# Patient Record
Sex: MALE | Race: WHITE | NOT HISPANIC OR LATINO | Employment: OTHER | URBAN - METROPOLITAN AREA
[De-identification: names, ages, dates, MRNs, and addresses within clinical notes are randomized per-mention and may not be internally consistent; named-entity substitution may affect disease eponyms.]

---

## 2017-05-02 ENCOUNTER — PATIENT MESSAGE (OUTPATIENT)
Dept: ORTHOPEDICS | Facility: CLINIC | Age: 72
End: 2017-05-02

## 2017-05-03 ENCOUNTER — TELEPHONE (OUTPATIENT)
Dept: ORTHOPEDICS | Facility: CLINIC | Age: 72
End: 2017-05-03

## 2017-05-03 DIAGNOSIS — Z09 FOLLOW-UP EXAMINATION AFTER ORTHOPEDIC SURGERY: ICD-10-CM

## 2017-05-03 RX ORDER — OXYCODONE AND ACETAMINOPHEN 10; 325 MG/1; MG/1
1 TABLET ORAL EVERY 8 HOURS PRN
Qty: 60 TABLET | Refills: 0 | Status: SHIPPED | OUTPATIENT
Start: 2017-05-03 | End: 2017-05-08 | Stop reason: SDUPTHER

## 2017-05-03 NOTE — TELEPHONE ENCOUNTER
Spoke with pt.  Advised script is ready to  at our office.   Pt insisted that script be mailed to his home address

## 2017-05-08 DIAGNOSIS — Z09 FOLLOW-UP EXAMINATION AFTER ORTHOPEDIC SURGERY: ICD-10-CM

## 2017-05-08 RX ORDER — OXYCODONE AND ACETAMINOPHEN 10; 325 MG/1; MG/1
1 TABLET ORAL EVERY 8 HOURS PRN
Qty: 60 TABLET | Refills: 0 | Status: SHIPPED | OUTPATIENT
Start: 2017-05-08 | End: 2018-02-08 | Stop reason: ALTCHOICE

## 2017-06-14 ENCOUNTER — OFFICE VISIT (OUTPATIENT)
Dept: NEPHROLOGY | Facility: CLINIC | Age: 72
End: 2017-06-14
Payer: MEDICARE

## 2017-06-14 ENCOUNTER — LAB VISIT (OUTPATIENT)
Dept: LAB | Facility: HOSPITAL | Age: 72
End: 2017-06-14
Attending: INTERNAL MEDICINE
Payer: MEDICARE

## 2017-06-14 VITALS — HEART RATE: 61 BPM | OXYGEN SATURATION: 98 % | WEIGHT: 167.13 LBS | BODY MASS INDEX: 24.75 KG/M2 | HEIGHT: 69 IN

## 2017-06-14 DIAGNOSIS — N18.30 CHRONIC KIDNEY DISEASE, STAGE III (MODERATE): ICD-10-CM

## 2017-06-14 DIAGNOSIS — N20.0 CALCULUS OF KIDNEY: ICD-10-CM

## 2017-06-14 DIAGNOSIS — N18.30 CHRONIC KIDNEY DISEASE, STAGE III (MODERATE): Primary | ICD-10-CM

## 2017-06-14 DIAGNOSIS — N17.9 ACUTE RENAL FAILURE, UNSPECIFIED ACUTE RENAL FAILURE TYPE: ICD-10-CM

## 2017-06-14 LAB
ALBUMIN SERPL BCP-MCNC: 3.9 G/DL
ANION GAP SERPL CALC-SCNC: 7 MMOL/L
BASOPHILS # BLD AUTO: 0.03 K/UL
BASOPHILS NFR BLD: 0.5 %
BUN SERPL-MCNC: 36 MG/DL
CALCIUM SERPL-MCNC: 9.3 MG/DL
CHLORIDE SERPL-SCNC: 111 MMOL/L
CO2 SERPL-SCNC: 21 MMOL/L
CREAT SERPL-MCNC: 1.7 MG/DL
DIFFERENTIAL METHOD: ABNORMAL
EOSINOPHIL # BLD AUTO: 0.2 K/UL
EOSINOPHIL NFR BLD: 2.9 %
EOSINOPHIL URNS QL WRIGHT STN: NORMAL
ERYTHROCYTE [DISTWIDTH] IN BLOOD BY AUTOMATED COUNT: 13.2 %
EST. GFR  (AFRICAN AMERICAN): 46.5 ML/MIN/1.73 M^2
EST. GFR  (NON AFRICAN AMERICAN): 40.3 ML/MIN/1.73 M^2
GLUCOSE SERPL-MCNC: 135 MG/DL
HCT VFR BLD AUTO: 38.7 %
HGB BLD-MCNC: 13.3 G/DL
LYMPHOCYTES # BLD AUTO: 1.3 K/UL
LYMPHOCYTES NFR BLD: 22 %
MCH RBC QN AUTO: 30.9 PG
MCHC RBC AUTO-ENTMCNC: 34.4 %
MCV RBC AUTO: 90 FL
MONOCYTES # BLD AUTO: 0.6 K/UL
MONOCYTES NFR BLD: 9.7 %
NEUTROPHILS # BLD AUTO: 3.8 K/UL
NEUTROPHILS NFR BLD: 64.2 %
PHOSPHATE SERPL-MCNC: 3.5 MG/DL
PLATELET # BLD AUTO: 134 K/UL
PMV BLD AUTO: 12.7 FL
POTASSIUM SERPL-SCNC: 5 MMOL/L
PTH-INTACT SERPL-MCNC: 56 PG/ML
RBC # BLD AUTO: 4.3 M/UL
SODIUM SERPL-SCNC: 139 MMOL/L
WBC # BLD AUTO: 5.86 K/UL

## 2017-06-14 PROCEDURE — 86334 IMMUNOFIX E-PHORESIS SERUM: CPT

## 2017-06-14 PROCEDURE — 1159F MED LIST DOCD IN RCRD: CPT | Mod: S$GLB,,, | Performed by: INTERNAL MEDICINE

## 2017-06-14 PROCEDURE — 85025 COMPLETE CBC W/AUTO DIFF WBC: CPT

## 2017-06-14 PROCEDURE — 99204 OFFICE O/P NEW MOD 45 MIN: CPT | Mod: S$GLB,,, | Performed by: INTERNAL MEDICINE

## 2017-06-14 PROCEDURE — 99999 PR PBB SHADOW E&M-EST. PATIENT-LVL II: CPT | Mod: PBBFAC,,, | Performed by: INTERNAL MEDICINE

## 2017-06-14 PROCEDURE — 86334 IMMUNOFIX E-PHORESIS SERUM: CPT | Mod: 26,,, | Performed by: PATHOLOGY

## 2017-06-14 PROCEDURE — 36415 COLL VENOUS BLD VENIPUNCTURE: CPT | Mod: PO

## 2017-06-14 PROCEDURE — 80069 RENAL FUNCTION PANEL: CPT

## 2017-06-14 PROCEDURE — 83970 ASSAY OF PARATHORMONE: CPT

## 2017-06-14 PROCEDURE — 1125F AMNT PAIN NOTED PAIN PRSNT: CPT | Mod: S$GLB,,, | Performed by: INTERNAL MEDICINE

## 2017-06-14 PROCEDURE — 99499 UNLISTED E&M SERVICE: CPT | Mod: S$GLB,,, | Performed by: INTERNAL MEDICINE

## 2017-06-14 RX ORDER — DEXTROMETHORPHAN HYDROBROMIDE, GUAIFENESIN 5; 100 MG/5ML; MG/5ML
650 LIQUID ORAL 2 TIMES DAILY
COMMUNITY
Start: 2022-12-20

## 2017-06-14 NOTE — LETTER
June 14, 2017      Ally Grajeda MD  [address] [facility] [address]           [facility]  [address] [facility] [address] [place] [address]  Phone: [phone]          Patient: Rojelio Dye Sr.   MR Number: [id number]   YOB: 1945   Date of Visit: 6/14/2017       Dear Dr. Ally Grajeda:    Thank you for referring Rojelio Dye to me for evaluation. Attached you will find relevant portions of my assessment and plan of care.    If you have questions, please do not hesitate to call me. I look forward to following Rojelio Dye along with you.    Sincerely,    Rodriguez Brandt MD    Enclosure  CC:  No Recipients    If you would like to receive this communication electronically, please contact [email or web address] or [phone] to request more information on [employer] Link access.    For providers and/or their staff who would like to refer a patient to [facility], please contact us through our one-stop-shop provider referral line, [facility], [place], at [phone].    If you feel you have received this communication in error or would no longer like to receive these types of communications, please e-mail [email or web address]

## 2017-06-14 NOTE — PROGRESS NOTES
Subjective:       Patient ID: Rojelio Dye Sr. is a 71 y.o. White male who presents for new patient evaluation for chronic renal failure.    Rojelio Dye Sr. is referred by Ally Grajeda MD to be evaluated for chronic renal failure.  He had a creatinine of 1.6 in 2015 and now was found to have a creatinine of 2.21.  He has been taking naprosyn on a daily basis for some time for his hands and feet but is very active.  He has no uremic or urinary symptoms and is in his usual state of health.  There have been no recent illnesses, hospitalizations or procedures.  He has been placed on no other new medications recently.  He does report that he passed a kidney stone 2 months ago.  He has been seen by Dr. Beltran in the past for kidney stones.      Review of Systems   Constitutional: Negative for appetite change, chills and fever.   Eyes: Negative for visual disturbance.   Respiratory: Negative for cough and shortness of breath.    Cardiovascular: Negative for chest pain and leg swelling.   Gastrointestinal: Negative for diarrhea, nausea and vomiting.   Genitourinary: Negative for difficulty urinating, dysuria and hematuria.   Musculoskeletal: Positive for arthralgias (hands and feet) and back pain.   Skin: Negative for rash.   Neurological: Negative for headaches.   Hematological: Bruises/bleeds easily.         Past Medical History:   Diagnosis Date    Acid reflux     Acquired hammer toe of right foot 8/29/2014    Arthritis of foot 12/9/2015    Benign essential hypertension 4/23/2014    BPH with urinary obstruction 12/10/2015    CKD (chronic kidney disease), stage II 4/23/2014    Encounter for blood transfusion     Hypercholesterolemia without hypertriglyceridemia 7/17/2004    Mild intermittent asthma without complication 4/23/2014    Narcolepsy     ADRIANNA on CPAP 4/23/2014    Osteoarthrosis involving, or with mention of more than one site, but not specified as generalized, multiple sites  12/28/2011    Pericarditis     Pes planovalgus 5/5/2014    Pre-diabetes 4/23/2014     Past Surgical History:   Procedure Laterality Date    ADENOIDECTOMY      COCHLEAR IMPLANT REVISION      FOOT SURGERY  4-22-14    left    HERNIA REPAIR      PROSTATE SURGERY      TONSILLECTOMY      VASECTOMY       Social History     Social History    Marital status:      Spouse name: N/A    Number of children: N/A    Years of education: N/A     Occupational History    Not on file.     Social History Main Topics    Smoking status: Never Smoker    Smokeless tobacco: Never Used    Alcohol use Yes      Comment: occ    Drug use: No    Sexual activity: Not Currently     Partners: Female     Birth control/ protection: None     Other Topics Concern    Not on file     Social History Narrative    No narrative on file     Current Outpatient Prescriptions   Medication Sig    azelastine (ASTEPRO) 0.15 % (205.5 mcg) Spry 2 Spray, Non-Aerosol Nasal Every evening    budesonide-formoterol 160-4.5 mcg (SYMBICORT) 160-4.5 mcg/actuation HFAA Inhale into the lungs. 2 HFA Aerosol Inhaler Inhalation Twice a day     carvedilol (COREG) 12.5 MG tablet Take 12.5 mg by mouth 2 (two) times daily.    carvedilol PHOSPHATE 40 MG ORAL CM24 (COREG CR) 40 MG CM24 Take 1 capsule (40 mg total) by mouth every evening. 1 Cap, ER Multiphase 24 hr Oral Every day    citalopram (CELEXA) 10 MG tablet TAKE 1 TABLET BY MOUTH DAILY IN THE MORNING    docusate sodium (COLACE) 100 MG capsule Take 1 capsule (100 mg total) by mouth 2 (two) times daily as needed for Constipation.    FLUTICASONE PROPIONATE (FLONASE ALLERGY RELIEF NASL)     glucosamine-chondroitin 500-400 mg tablet Take 1 tablet by mouth 2 (two) times daily.     ketorolac (TORADOL) 30 mg/mL (1 mL) injection     levocetirizine (XYZAL) 5 MG tablet Take 1 tablet (5 mg total) by mouth every evening.    lisinopril-hydrochlorothiazide (PRINZIDE,ZESTORETIC) 10-12.5 mg per tablet TAKE 1  "TABLET BY MOUTH DAILY    multivitamin with minerals tablet     naproxen (NAPROSYN) 500 MG tablet Take 500 mg by mouth 2 (two) times daily.    naproxen (NAPROSYN) 500 MG tablet TAKE 1 TABLET BY MOUTH TWICE DAILY    oxybutynin (DITROPAN) 5 MG Tab Take 5 mg by mouth 2 (two) times daily.    oxycodone-acetaminophen (PERCOCET)  mg per tablet Take 1 tablet by mouth every 8 (eight) hours as needed for Pain.    pramipexole (MIRAPEX) 1.5 MG tablet TAKE 1 TABLET AT NIGHT FOR RESTLESS LEG SYNDROME    pramipexole (MIRAPEX) 1.5 MG tablet TAKE 1 TABLET AT NIGHT FOR RESTLESS LEG SYNDROME    PROAIR HFA 90 mcg/actuation inhaler Inhale 1 puff into the lungs once daily.     promethazine (PHENERGAN) 12.5 MG Tab Take 1 tablet (12.5 mg total) by mouth every 6 (six) hours as needed (for nausea).    ranitidine (ZANTAC) 150 MG tablet Take 150 mg by mouth nightly. 1 Tablet Oral Every day    ranitidine (ZANTAC) 300 MG tablet     simvastatin (ZOCOR) 40 MG tablet TAKE 1 TABLET BY MOUTH AT BEDTIME FOR CHOLESTEROL    solifenacin (VESICARE) 5 MG tablet Take 5 mg by mouth. 1 Tablet Oral Every evening    TRADJENTA 5 mg Tab tablet TK 1 T PO D    VIAGRA 100 mg tablet      No current facility-administered medications for this visit.        Pulse 61   Ht 5' 9" (1.753 m)   Wt 75.8 kg (167 lb 1.7 oz)   SpO2 98%   BMI 24.68 kg/m²     Objective:      Physical Exam   Constitutional: He is oriented to person, place, and time. He appears well-developed and well-nourished. No distress.   HENT:   Head: Normocephalic and atraumatic.   Eyes: Conjunctivae are normal.   Neck: Neck supple. No JVD present.   Cardiovascular: Normal rate, regular rhythm and normal heart sounds.  Exam reveals no gallop and no friction rub.    No murmur heard.  Pulmonary/Chest: Effort normal and breath sounds normal. No respiratory distress. He has no wheezes. He has no rales.   Abdominal: Soft. Bowel sounds are normal. He exhibits no distension. There is no " tenderness.   Musculoskeletal: He exhibits no edema.   Neurological: He is alert and oriented to person, place, and time.   Skin: Skin is warm and dry. No rash noted.   Psychiatric: He has a normal mood and affect.   Vitals reviewed.      Assessment:       1. Chronic kidney disease, stage III (moderate)    2. Acute renal failure, unspecified acute renal failure type    3. Calculus of kidney        Plan:   Return to clinic in 4 weeks.  Labs for today include rp, pth, ua, upep, spep, urine for eosinophils.  Baseline creatinine is 1.5 since 2015.  Renal US on 7/5 in afternoon.  I really do suspect he is having some change in her renal function due to his chronic use of NSAIDS.  We will begin a limited evaluation today and repeat his renal US when he gets back into town from the trip his is planning to go on tomorrow.

## 2017-06-15 ENCOUNTER — PATIENT MESSAGE (OUTPATIENT)
Dept: NEPHROLOGY | Facility: CLINIC | Age: 72
End: 2017-06-15

## 2017-06-15 LAB
INTERPRETATION SERPL IFE-IMP: NORMAL
PATHOLOGIST INTERPRETATION IFE: NORMAL

## 2017-06-15 NOTE — PROGRESS NOTES
Patient, Rojelio Dye Tonya (MRN #[id number]), presented with a recent Estimated Glumerular Filtration Rate (EGFR) between 15 and 29 consistent with the definition of chronic kidney disease stage 4 (ICD10 - N18.4).    eGFR if non    Date Value Ref Range Status   06/14/2017 40.3 (A) >60 mL/min/1.73 m^2 Final     Comment:     Calculation used to obtain the estimated glomerular filtration  rate (eGFR) is the CKD-EPI equation. Since race is unknown   in our information system, the eGFR values for   -American and Non--American patients are given   for each creatinine result.         The patient's chronic kidney disease stage 4 was monitored, evaluated, addressed and/or treated. This addendum to the medical record is made on 06/15/2017.

## 2017-07-05 ENCOUNTER — HOSPITAL ENCOUNTER (OUTPATIENT)
Dept: RADIOLOGY | Facility: HOSPITAL | Age: 72
Discharge: HOME | End: 2017-07-05
Attending: INTERNAL MEDICINE
Payer: MEDICARE

## 2017-07-05 DIAGNOSIS — N18.30 CHRONIC KIDNEY DISEASE, STAGE III (MODERATE): ICD-10-CM

## 2017-07-05 DIAGNOSIS — N17.9 ACUTE RENAL FAILURE, UNSPECIFIED ACUTE RENAL FAILURE TYPE: ICD-10-CM

## 2017-07-05 DIAGNOSIS — N20.0 CALCULUS OF KIDNEY: ICD-10-CM

## 2017-07-05 PROCEDURE — 76770 US EXAM ABDO BACK WALL COMP: CPT | Mod: TC,PO

## 2017-07-05 PROCEDURE — 76770 US EXAM ABDO BACK WALL COMP: CPT | Mod: 26,,, | Performed by: RADIOLOGY

## 2017-07-12 ENCOUNTER — OFFICE VISIT (OUTPATIENT)
Dept: NEPHROLOGY | Facility: CLINIC | Age: 72
End: 2017-07-12
Payer: MEDICARE

## 2017-07-12 VITALS
BODY MASS INDEX: 25.18 KG/M2 | HEART RATE: 54 BPM | WEIGHT: 170 LBS | HEIGHT: 69 IN | DIASTOLIC BLOOD PRESSURE: 75 MMHG | SYSTOLIC BLOOD PRESSURE: 120 MMHG

## 2017-07-12 DIAGNOSIS — N20.0 CALCULUS OF KIDNEY: ICD-10-CM

## 2017-07-12 DIAGNOSIS — N18.30 CHRONIC KIDNEY DISEASE, STAGE III (MODERATE): Primary | ICD-10-CM

## 2017-07-12 PROCEDURE — 99999 PR PBB SHADOW E&M-EST. PATIENT-LVL III: CPT | Mod: PBBFAC,,, | Performed by: INTERNAL MEDICINE

## 2017-07-12 PROCEDURE — 99214 OFFICE O/P EST MOD 30 MIN: CPT | Mod: S$GLB,,, | Performed by: INTERNAL MEDICINE

## 2017-07-12 PROCEDURE — 1159F MED LIST DOCD IN RCRD: CPT | Mod: S$GLB,,, | Performed by: INTERNAL MEDICINE

## 2017-07-12 PROCEDURE — 1126F AMNT PAIN NOTED NONE PRSNT: CPT | Mod: S$GLB,,, | Performed by: INTERNAL MEDICINE

## 2017-07-12 RX ORDER — CLINDAMYCIN HYDROCHLORIDE 150 MG/1
CAPSULE ORAL
COMMUNITY
Start: 2017-07-06 | End: 2018-02-08 | Stop reason: ALTCHOICE

## 2017-07-12 RX ORDER — HYDROCODONE BITARTRATE AND ACETAMINOPHEN 7.5; 325 MG/1; MG/1
TABLET ORAL
COMMUNITY
Start: 2017-07-06 | End: 2018-09-18 | Stop reason: SDUPTHER

## 2017-07-12 NOTE — PROGRESS NOTES
"Subjective:       Patient ID: Rojelio Dye Sr. is a 72 y.o. White male who presents for return patient evaluation for chronic renal failure.    He has no uremic or urinary symptoms and is in his usual state of health.  There have been no recent illnesses, hospitalizations or procedures.  His trip was successful.      Review of Systems   Constitutional: Negative for appetite change, chills and fever.   Eyes: Negative for visual disturbance.   Respiratory: Negative for cough and shortness of breath.    Cardiovascular: Negative for chest pain and leg swelling.   Gastrointestinal: Negative for abdominal pain, diarrhea, nausea and vomiting.   Genitourinary: Negative for difficulty urinating, dysuria and hematuria.   Musculoskeletal: Positive for arthralgias (hands and feet) and back pain. Negative for myalgias.   Skin: Negative for rash.   Neurological: Negative for headaches.   Hematological: Bruises/bleeds easily.   Psychiatric/Behavioral: Negative for sleep disturbance.         /75 (BP Location: Left arm, Patient Position: Sitting, BP Method: Manual)   Pulse (!) 54   Ht 5' 9" (1.753 m)   Wt 77.1 kg (169 lb 15.6 oz)   BMI 25.10 kg/m²     Objective:      Physical Exam   Constitutional: He appears well-developed and well-nourished. No distress.   HENT:   Head: Normocephalic and atraumatic.   Eyes: Conjunctivae are normal. No scleral icterus.   Neck: Normal range of motion. No JVD present.   Cardiovascular: Normal rate, regular rhythm and normal heart sounds.  Exam reveals no gallop and no friction rub.    No murmur heard.  Pulmonary/Chest: Effort normal and breath sounds normal. No respiratory distress. He has no wheezes.   Abdominal: Soft. Bowel sounds are normal. He exhibits no distension. There is no tenderness.   Musculoskeletal: He exhibits no edema.   Skin: Skin is warm and dry. No rash noted.   Psychiatric: He has a normal mood and affect.   Vitals reviewed.      Assessment:       1. Chronic kidney " disease, stage III (moderate)    2. Calculus of kidney        Plan:   Return to clinic in 4 months.  Labs for today include rp, pth.  Baseline creatinine is 1.5 since 2015.  Blood pressure is controlled on the current regimen.  Continue current medications as prescribed and reviewed.   His acute component of his renal failure has resolved.

## 2017-09-27 ENCOUNTER — TELEPHONE (OUTPATIENT)
Dept: NEUROSURGERY | Facility: CLINIC | Age: 72
End: 2017-09-27

## 2017-09-27 DIAGNOSIS — D36.10 SCHWANNOMA: Primary | ICD-10-CM

## 2017-10-30 ENCOUNTER — OFFICE VISIT (OUTPATIENT)
Dept: NEPHROLOGY | Facility: CLINIC | Age: 72
End: 2017-10-30
Payer: MEDICARE

## 2017-10-30 VITALS
WEIGHT: 174.19 LBS | HEIGHT: 69 IN | HEART RATE: 47 BPM | DIASTOLIC BLOOD PRESSURE: 70 MMHG | BODY MASS INDEX: 25.8 KG/M2 | OXYGEN SATURATION: 98 % | SYSTOLIC BLOOD PRESSURE: 140 MMHG

## 2017-10-30 DIAGNOSIS — I10 BENIGN ESSENTIAL HYPERTENSION: ICD-10-CM

## 2017-10-30 DIAGNOSIS — N20.0 CALCULUS OF KIDNEY: ICD-10-CM

## 2017-10-30 DIAGNOSIS — N18.30 CHRONIC KIDNEY DISEASE, STAGE III (MODERATE): Primary | ICD-10-CM

## 2017-10-30 PROCEDURE — 99999 PR PBB SHADOW E&M-EST. PATIENT-LVL III: CPT | Mod: PBBFAC,,, | Performed by: INTERNAL MEDICINE

## 2017-10-30 PROCEDURE — 99214 OFFICE O/P EST MOD 30 MIN: CPT | Mod: S$GLB,,, | Performed by: INTERNAL MEDICINE

## 2017-10-30 RX ORDER — ROPINIROLE 5 MG/1
TABLET, FILM COATED ORAL
COMMUNITY
Start: 2017-08-30 | End: 2018-02-08

## 2017-10-30 RX ORDER — CITALOPRAM 10 MG/1
10 TABLET ORAL DAILY
Status: ON HOLD | COMMUNITY
Start: 2017-10-02 | End: 2019-01-09 | Stop reason: HOSPADM

## 2017-10-30 RX ORDER — DICLOFENAC SODIUM 10 MG/G
2 GEL TOPICAL 2 TIMES DAILY
COMMUNITY
End: 2021-03-12 | Stop reason: CLARIF

## 2017-10-30 NOTE — PROGRESS NOTES
"Subjective:       Patient ID: Rojelio Dye Sr. is a 72 y.o. White male who presents for return patient evaluation for chronic renal failure.    He has no uremic or urinary symptoms and is in his usual state of health.  There have been no recent illnesses, hospitalizations or procedures.        Review of Systems   Constitutional: Negative for appetite change, chills and fever.   HENT: Negative for congestion.    Eyes: Negative for visual disturbance.   Respiratory: Negative for cough and shortness of breath.    Cardiovascular: Negative for chest pain and leg swelling.   Gastrointestinal: Negative for abdominal pain, diarrhea, nausea and vomiting.   Genitourinary: Negative for difficulty urinating, dysuria and hematuria.   Musculoskeletal: Positive for arthralgias (hands and feet), back pain and gait problem. Negative for myalgias.   Skin: Negative for rash.   Neurological: Negative for headaches.   Hematological: Bruises/bleeds easily.   Psychiatric/Behavioral: Negative for sleep disturbance.       The past medical, family and social histories were reviewed for this encounter.     BP (!) 140/70 (BP Method: Large (Manual))   Pulse (!) 47   Ht 5' 9" (1.753 m)   Wt 79 kg (174 lb 2.6 oz)   SpO2 98%   BMI 25.72 kg/m²     Objective:      Physical Exam   Constitutional: He appears well-developed and well-nourished. No distress.   HENT:   Head: Normocephalic and atraumatic.   Eyes: Conjunctivae are normal. No scleral icterus.   Neck: Normal range of motion. No JVD present.   Cardiovascular: Normal rate, regular rhythm and normal heart sounds.  Exam reveals no gallop and no friction rub.    No murmur heard.  Pulmonary/Chest: Effort normal and breath sounds normal. No respiratory distress. He has no wheezes.   Abdominal: Soft. Bowel sounds are normal. He exhibits no distension. There is no tenderness.   Musculoskeletal: He exhibits no edema.   Skin: Skin is warm and dry. No rash noted.   Psychiatric: He has a normal " mood and affect.   Vitals reviewed.      Assessment:       1. Chronic kidney disease, stage III (moderate)    2. Calculus of kidney    3. Benign essential hypertension        Plan:   Return to clinic in 3 months.  Labs for today include rp, ua.  Baseline creatinine is 1.5 since 2015.  I suspect he is having some change in her renal function due to his chronic use of NSAIDS.   Blood pressure is controlled on the current regimen.  Continue current medications as prescribed and reviewed.

## 2017-10-31 ENCOUNTER — HOSPITAL ENCOUNTER (OUTPATIENT)
Dept: RADIOLOGY | Facility: HOSPITAL | Age: 72
Discharge: HOME | End: 2017-10-31
Attending: NEUROLOGICAL SURGERY
Payer: MEDICARE

## 2017-10-31 ENCOUNTER — OFFICE VISIT (OUTPATIENT)
Dept: NEUROSURGERY | Facility: CLINIC | Age: 72
End: 2017-10-31
Payer: MEDICARE

## 2017-10-31 VITALS
BODY MASS INDEX: 25.23 KG/M2 | SYSTOLIC BLOOD PRESSURE: 143 MMHG | WEIGHT: 170.88 LBS | TEMPERATURE: 100 F | DIASTOLIC BLOOD PRESSURE: 81 MMHG | HEART RATE: 52 BPM

## 2017-10-31 DIAGNOSIS — D36.10 SCHWANNOMA: Primary | ICD-10-CM

## 2017-10-31 DIAGNOSIS — D36.10 SCHWANNOMA: ICD-10-CM

## 2017-10-31 PROCEDURE — 99999 PR PBB SHADOW E&M-EST. PATIENT-LVL III: CPT | Mod: PBBFAC,,, | Performed by: NEUROLOGICAL SURGERY

## 2017-10-31 PROCEDURE — 72146 MRI CHEST SPINE W/O DYE: CPT | Mod: 26,,, | Performed by: RADIOLOGY

## 2017-10-31 PROCEDURE — 99213 OFFICE O/P EST LOW 20 MIN: CPT | Mod: S$GLB,,, | Performed by: NEUROLOGICAL SURGERY

## 2017-10-31 PROCEDURE — 72146 MRI CHEST SPINE W/O DYE: CPT | Mod: TC

## 2017-10-31 NOTE — PROGRESS NOTES
Subjective:    I, Sridevi Echavarria, am scribing for, and in the presence of, Dr. Alphonse Jesus.     Patient ID: Rojelio Dye Sr. is a 72 y.o. male.    Chief Complaint: Follow-up    HPI   Pt is a 71 yo male with a schwannoma who presents today for 1 year FU with MRI. At the time of last office visit on 11/9/2016, pt complained of continued problems with his knees and feet. Today, pt states he is doing well without complications.     Review of Systems   Constitutional: Negative for chills and fever.   HENT: Negative.    Eyes: Negative.    Respiratory: Negative.    Cardiovascular: Negative.    Gastrointestinal: Negative.    Endocrine: Negative.    Genitourinary: Negative.    Musculoskeletal: Negative.    Skin: Negative.    Allergic/Immunologic: Negative.    Neurological: Negative for tremors, weakness, light-headedness, numbness and headaches.   Hematological: Negative.    Psychiatric/Behavioral: Negative.        Past Medical History:   Diagnosis Date    Acid reflux     Acquired hammer toe of right foot 8/29/2014    Arthritis of foot 12/9/2015    Benign essential hypertension 4/23/2014    BPH with urinary obstruction 12/10/2015    CKD (chronic kidney disease), stage II 4/23/2014    Followed by Dr. Rodriguez Brandt    Encounter for blood transfusion     Hypercholesterolemia without hypertriglyceridemia 7/17/2004    Mild intermittent asthma without complication 4/23/2014    Narcolepsy     ADRIANNA on CPAP 4/23/2014    Osteoarthrosis involving, or with mention of more than one site, but not specified as generalized, multiple sites 12/28/2011    Pericarditis     Pes planovalgus 5/5/2014    Pre-diabetes 4/23/2014       Objective:     BP (!) 143/81   Pulse (!) 52   Temp 99.5 °F (37.5 °C) (Oral)   Wt 77.5 kg (170 lb 13.7 oz)   BMI 25.23 kg/m²     Physical Exam   Constitutional: He is oriented to person, place, and time. He appears well-developed and well-nourished.   Eyes: Pupils are equal, round, and reactive to  light.   Neurological: He is alert and oriented to person, place, and time. No cranial nerve deficit.       Imaging:  MRI T-spine W WO Contrast 10/31/2017 shows no breach of bone or membrane around tumor.     I have personally reviewed the images with the pt.      I, Dr. Alphonse Jesus, personally performed the services described in this documentation as scribed by Sridevi Echavarria in my presence, and it is both accurate and complete.    Assessment:       Thoracic schwannoma.    Plan:   I have reviewed the patient's MRI of T-spine, which shows  no breach of bone or membrane around tumor. I will schedule the patient a 2 year FU with MRI of T-spine.

## 2017-10-31 NOTE — PATIENT INSTRUCTIONS
I have reviewed the patient's MRI of T-spine, which shows no breach of bone or membrane around tumor. I will schedule the patient a 2 year FU with MRI of T-spine.

## 2017-11-24 ENCOUNTER — PATIENT MESSAGE (OUTPATIENT)
Dept: RHEUMATOLOGY | Facility: CLINIC | Age: 72
End: 2017-11-24

## 2018-02-08 ENCOUNTER — OFFICE VISIT (OUTPATIENT)
Dept: NEPHROLOGY | Facility: CLINIC | Age: 73
End: 2018-02-08
Payer: MEDICARE

## 2018-02-08 VITALS
HEART RATE: 61 BPM | DIASTOLIC BLOOD PRESSURE: 68 MMHG | OXYGEN SATURATION: 99 % | BODY MASS INDEX: 25.14 KG/M2 | WEIGHT: 169.75 LBS | HEIGHT: 69 IN | SYSTOLIC BLOOD PRESSURE: 120 MMHG

## 2018-02-08 DIAGNOSIS — N20.0 CALCULUS OF KIDNEY: ICD-10-CM

## 2018-02-08 DIAGNOSIS — N18.30 CHRONIC KIDNEY DISEASE, STAGE III (MODERATE): Primary | ICD-10-CM

## 2018-02-08 DIAGNOSIS — I10 BENIGN ESSENTIAL HYPERTENSION: ICD-10-CM

## 2018-02-08 PROCEDURE — 1159F MED LIST DOCD IN RCRD: CPT | Mod: S$GLB,,, | Performed by: INTERNAL MEDICINE

## 2018-02-08 PROCEDURE — 99213 OFFICE O/P EST LOW 20 MIN: CPT | Mod: PO | Performed by: INTERNAL MEDICINE

## 2018-02-08 PROCEDURE — 3008F BODY MASS INDEX DOCD: CPT | Mod: S$GLB,,, | Performed by: INTERNAL MEDICINE

## 2018-02-08 PROCEDURE — 99214 OFFICE O/P EST MOD 30 MIN: CPT | Mod: S$GLB,,, | Performed by: INTERNAL MEDICINE

## 2018-02-08 PROCEDURE — 99999 PR PBB SHADOW E&M-EST. PATIENT-LVL III: CPT | Mod: PBBFAC,,, | Performed by: INTERNAL MEDICINE

## 2018-02-08 PROCEDURE — 1125F AMNT PAIN NOTED PAIN PRSNT: CPT | Mod: S$GLB,,, | Performed by: INTERNAL MEDICINE

## 2018-02-08 RX ORDER — ROPINIROLE 5 MG/1
5 TABLET, FILM COATED ORAL NIGHTLY
COMMUNITY
End: 2018-12-14

## 2018-02-08 RX ORDER — FLUTICASONE PROPIONATE 50 MCG
SPRAY, SUSPENSION (ML) NASAL
Status: ON HOLD | COMMUNITY
Start: 2017-12-12 | End: 2019-01-08 | Stop reason: HOSPADM

## 2018-02-08 RX ORDER — MONTELUKAST SODIUM 10 MG/1
TABLET ORAL
COMMUNITY
Start: 2018-01-24 | End: 2018-02-08

## 2018-02-08 RX ORDER — TRIAMCINOLONE ACETONIDE 1 MG/G
CREAM TOPICAL
COMMUNITY
Start: 2017-12-07 | End: 2018-02-08
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
## 2018-02-09 ENCOUNTER — PATIENT MESSAGE (OUTPATIENT)
Dept: RHEUMATOLOGY | Facility: CLINIC | Age: 73
End: 2018-02-09

## 2018-03-28 ENCOUNTER — TELEPHONE (OUTPATIENT)
Dept: ORTHOPEDICS | Facility: CLINIC | Age: 73
End: 2018-03-28

## 2018-03-28 NOTE — TELEPHONE ENCOUNTER
----- Message from Jacob Gupta sent at 3/28/2018 10:43 AM CDT -----  Contact: Pt  Pt would like to be called back regarding a infected pinky toe on the left foot and pt has seen Dr. Olmstead in the past and pt states they will be leaving out of town soon.    Pt can be reached at [phone].    Thank You.

## 2018-03-29 ENCOUNTER — OFFICE VISIT (OUTPATIENT)
Dept: ORTHOPEDICS | Facility: CLINIC | Age: 73
End: 2018-03-29
Payer: MEDICARE

## 2018-03-29 DIAGNOSIS — L97.521 ULCER OF TOE OF LEFT FOOT, LIMITED TO BREAKDOWN OF SKIN: ICD-10-CM

## 2018-03-29 DIAGNOSIS — M20.42 HAMMER TOE OF LEFT FOOT: Primary | ICD-10-CM

## 2018-03-29 PROCEDURE — 99213 OFFICE O/P EST LOW 20 MIN: CPT | Mod: S$GLB,,, | Performed by: ORTHOPAEDIC SURGERY

## 2018-03-29 RX ORDER — CLINDAMYCIN HYDROCHLORIDE 300 MG/1
300 CAPSULE ORAL 3 TIMES DAILY
Qty: 42 CAPSULE | Refills: 0 | Status: SHIPPED | OUTPATIENT
Start: 2018-03-29 | End: 2018-09-18

## 2018-03-29 NOTE — PROGRESS NOTES
Mr. Dye returns today.  He is a 72-year-old gentleman who has severe   bilateral pes planovalgus deformities, despite two previous attempted triple   arthrodesis procedures.  He has been managing his feet with orthotics and   activity modification.  He developed a callus related to some hammering of his   left fifth toe and a blister formed about a week ago.  The blister has now been   unroofed and he has a lot of soreness and he is getting ready to go on a trip   and he wanted to have this checked out.  He does have diabetes, but he does not   report any fevers or abnormal blood sugars.  Examination today reveals bilateral   severe pes planovalgus deformities.  There is some hammering of his left fifth   toe with some moderate swelling and a superficial wound over the PIP joint of   the fifth toe.  There is no drainage.  He is tender, but there does not appear   to be any significant infection.  I believe this will require a correction at   some point.  He is also having some issues on his right foot with bony   prominence under medial midfoot region.  He says the orthotist he was using is   no longer around.  I am going to make a referral for him to go see another   orthotist for evaluation to pad these prominent areas.  For his trip, I advised   him to keep the wound covered.  I am going to put him on a prophylactic   antibiotic while he is on his trip.  He is going to call and let us know if and   when he would like to have his toe repaired.          /cat [id number] sadi(s)        OANH/ARNALDO  dd: 03/29/2018 07:51:44 (CDT)  td: 03/30/2018 04:48:29 (CDSIMON)  Doc ID   #[id number]  Job ID #[id number]    CC:     This office note has been dictated.

## 2018-05-07 ENCOUNTER — OFFICE VISIT (OUTPATIENT)
Dept: RHEUMATOLOGY | Facility: CLINIC | Age: 73
End: 2018-05-07
Payer: MEDICARE

## 2018-05-07 ENCOUNTER — HOSPITAL ENCOUNTER (OUTPATIENT)
Dept: RADIOLOGY | Facility: HOSPITAL | Age: 73
Discharge: HOME | End: 2018-05-07
Attending: INTERNAL MEDICINE
Payer: MEDICARE

## 2018-05-07 VITALS
TEMPERATURE: 98 F | BODY MASS INDEX: 26.41 KG/M2 | HEIGHT: 66 IN | SYSTOLIC BLOOD PRESSURE: 113 MMHG | WEIGHT: 164.31 LBS | HEART RATE: 53 BPM | DIASTOLIC BLOOD PRESSURE: 64 MMHG

## 2018-05-07 DIAGNOSIS — M79.642 PAIN IN BOTH HANDS: ICD-10-CM

## 2018-05-07 DIAGNOSIS — R76.8 RHEUMATOID FACTOR POSITIVE: ICD-10-CM

## 2018-05-07 DIAGNOSIS — M15.9 PRIMARY OSTEOARTHRITIS INVOLVING MULTIPLE JOINTS: ICD-10-CM

## 2018-05-07 DIAGNOSIS — M15.9 PRIMARY OSTEOARTHRITIS INVOLVING MULTIPLE JOINTS: Primary | ICD-10-CM

## 2018-05-07 DIAGNOSIS — M79.641 PAIN IN BOTH HANDS: ICD-10-CM

## 2018-05-07 PROCEDURE — 77077 JOINT SURVEY SINGLE VIEW: CPT | Mod: 26,,, | Performed by: RADIOLOGY

## 2018-05-07 PROCEDURE — 99999 PR PBB SHADOW E&M-EST. PATIENT-LVL III: CPT | Mod: PBBFAC,,, | Performed by: INTERNAL MEDICINE

## 2018-05-07 PROCEDURE — 3078F DIAST BP <80 MM HG: CPT | Mod: CPTII,S$GLB,, | Performed by: INTERNAL MEDICINE

## 2018-05-07 PROCEDURE — 99204 OFFICE O/P NEW MOD 45 MIN: CPT | Mod: S$GLB,,, | Performed by: INTERNAL MEDICINE

## 2018-05-07 PROCEDURE — 3074F SYST BP LT 130 MM HG: CPT | Mod: CPTII,S$GLB,, | Performed by: INTERNAL MEDICINE

## 2018-05-07 PROCEDURE — 77077 JOINT SURVEY SINGLE VIEW: CPT | Mod: TC

## 2018-05-07 ASSESSMENT — ROUTINE ASSESSMENT OF PATIENT INDEX DATA (RAPID3)
PATIENT GLOBAL ASSESSMENT SCORE: 5
PSYCHOLOGICAL DISTRESS SCORE: 2.2
TOTAL RAPID3 SCORE: 4.5
FATIGUE SCORE: 0
MDHAQ FUNCTION SCORE: .3
WHEN YOU AWAKENED IN THE MORNING OVER THE LAST WEEK, PLEASE INDICATE THE AMOUNT OF TIME IT TAKES UNTIL YOU ARE AS LIMBER AS YOU WILL BE FOR THE DAY: 20 MINUTES
AM STIFFNESS SCORE: 1, YES
PAIN SCORE: 7.5

## 2018-05-07 NOTE — PROGRESS NOTES
"Subjective:       Patient ID: Rojelio Dye Sr. is a 72 y.o. male.    Chief Complaint: Disease Management      HPI:  Rojelio Dye Sr. is a 72 y.o. male with positive RF, OA, Schwanomma in back at thoracic area and CKD Stage 3.      Last visit 9/2013 for +RF but normal joint scan and inflammatory markers.  Consulted Dr. Olmstead who did surgeries on both feet.  Left foot now with infection.  Right foot has worsened after surgery.   Trouble with wound healing.  In past had to have PICC line for antibiotics.    Saw Dr. Olmstead couple weeks ago and he wants to pin another toe.  Now with infection in toe but antibiotics did not help.      Dr. Elijah [facility]inal [facility]oped[facility] on [facility] injects knees with (last 1/2018).    Now with bilateral hand pain and swelling.  Morning stiffness 10-20 minutes      Review of Systems   Constitutional: Negative for fatigue and fever.   HENT: Negative.    Eyes: Negative.    Respiratory: Negative.    Cardiovascular: Negative.    Gastrointestinal: Positive for diarrhea.   Endocrine: Negative.    Genitourinary: Negative.    Musculoskeletal: Positive for arthralgias.   Allergic/Immunologic: Negative.    Neurological: Negative.    Hematological: Negative.    Psychiatric/Behavioral: Negative.          Objective:   /64 (BP Location: Left arm, Patient Position: Sitting, BP Method: Small (Automatic))   Pulse (!) 53   Temp 98.2 °F (36.8 °C) (Oral)   Ht 5' 6" (1.676 m)   Wt 74.5 kg (164 lb 4.8 oz)   BMI 26.52 kg/m²      Physical Exam   Constitutional: He is oriented to person, place, and time and well-developed, well-nourished, and in no distress.   HENT:   Head: Normocephalic.   Cardiovascular: Normal rate, regular rhythm and normal heart sounds.    Pulmonary/Chest: Effort normal and breath sounds normal.   Abdominal: Soft. Bowel sounds are normal.   Neurological: He is alert and oriented to person, place, and time. Gait normal.   Skin: Skin is dry.     Mild erythema of left 5th " toe   Psychiatric: Mood and affect normal.   Musculoskeletal:   Multiple Heberdens and Bouchards nodes  28 joint count:  0 Swollen and 4 Tender (left 4th and 5th MCP and 5th PIP; right 3rd PIP)                Assessment:       1. Arthralgias. OA vs. RA; positive RF but in past normal ESR and CRP previously; erosions on X-ray right 1st MTP; Now with bilateral hand pain and infection left 5th toe.  2. History of osteoarthrosis in multiple joints. Erosive OA on x-ray  3. Chondrocalinosis  4. Positive rheumatoid factor  5. Diabetes.  Not on any medications.  Tragenta caused glucose to drop  6. Schwanoma    Plan:       1.  Arthritis survey  2.  Labs  3. Follow with Dr. Ishan BANKSO 4 months/prn

## 2018-05-08 ENCOUNTER — PATIENT MESSAGE (OUTPATIENT)
Dept: RHEUMATOLOGY | Facility: CLINIC | Age: 73
End: 2018-05-08

## 2018-05-08 NOTE — TELEPHONE ENCOUNTER
Labs with negative RF and CCP.  Normal ESR and CRP.  However elevated uric acid.  X-ray with chondrocalcinosis, degenerative changes and erosions.  Consider use of colchicine to cover gout and pseudogout.  Need to follow gout diet (will mail a copy of gout diet or can get off internet).   Follow with nephrologist regarding hyperkalemia and use of colchicine.      Informed patient and

## 2018-05-08 NOTE — TELEPHONE ENCOUNTER
I am ok with using colchicine.    Regarding his potassium, Doreen please instruct him as to a lower potassium diet.

## 2018-05-09 ENCOUNTER — PATIENT MESSAGE (OUTPATIENT)
Dept: RHEUMATOLOGY | Facility: HOSPITAL | Age: 73
End: 2018-05-09

## 2018-05-09 DIAGNOSIS — M79.642 PAIN IN BOTH HANDS: ICD-10-CM

## 2018-05-09 DIAGNOSIS — M11.20 PSEUDOGOUT: Primary | ICD-10-CM

## 2018-05-09 DIAGNOSIS — M79.641 PAIN IN BOTH HANDS: ICD-10-CM

## 2018-05-09 DIAGNOSIS — Z87.39 PERSONAL HISTORY OF CALCIUM PYROPHOSPHATE DEPOSITION DISEASE (CPPD): ICD-10-CM

## 2018-05-09 RX ORDER — COLCHICINE 0.6 MG/1
0.6 TABLET ORAL 2 TIMES DAILY
Qty: 60 TABLET | Refills: 1 | Status: SHIPPED | OUTPATIENT
Start: 2018-05-09 | End: 2018-05-14 | Stop reason: SDUPTHER

## 2018-05-09 NOTE — TELEPHONE ENCOUNTER
Okay to proceed with colchicine per message in chart:    Rodriguez Brandt MD    Note      I am ok with using colchicine.     Regarding his potassium, Doreen please instruct him as to a lower potassium diet.

## 2018-05-10 ENCOUNTER — PATIENT MESSAGE (OUTPATIENT)
Dept: RHEUMATOLOGY | Facility: CLINIC | Age: 73
End: 2018-05-10

## 2018-05-14 ENCOUNTER — TELEPHONE (OUTPATIENT)
Dept: RHEUMATOLOGY | Facility: CLINIC | Age: 73
End: 2018-05-14

## 2018-05-14 DIAGNOSIS — M79.642 PAIN IN BOTH HANDS: ICD-10-CM

## 2018-05-14 DIAGNOSIS — M11.20 PSEUDOGOUT: ICD-10-CM

## 2018-05-14 DIAGNOSIS — M79.641 PAIN IN BOTH HANDS: ICD-10-CM

## 2018-05-14 RX ORDER — COLCHICINE 0.6 MG/1
0.6 TABLET ORAL 2 TIMES DAILY
Qty: 60 TABLET | Refills: 1 | Status: SHIPPED | OUTPATIENT
Start: 2018-05-14 | End: 2018-07-07 | Stop reason: SDUPTHER

## 2018-05-14 NOTE — TELEPHONE ENCOUNTER
----- Message from Malachi Humphrey sent at 5/14/2018  2:14 PM CDT -----  Contact: pt wife   Pt wife is requesting pt rx be sent to [facility] [address] [address] [phone] (Phone) [phone] (Fax). Wife stated physician knows the medication. Pt wife can be reached at [phone].

## 2018-05-15 ENCOUNTER — PATIENT MESSAGE (OUTPATIENT)
Dept: RHEUMATOLOGY | Facility: CLINIC | Age: 73
End: 2018-05-15

## 2018-06-20 ENCOUNTER — PATIENT MESSAGE (OUTPATIENT)
Dept: RHEUMATOLOGY | Facility: CLINIC | Age: 73
End: 2018-06-20

## 2018-06-21 ENCOUNTER — PATIENT MESSAGE (OUTPATIENT)
Dept: RHEUMATOLOGY | Facility: CLINIC | Age: 73
End: 2018-06-21

## 2018-07-07 DIAGNOSIS — M79.642 PAIN IN BOTH HANDS: ICD-10-CM

## 2018-07-07 DIAGNOSIS — M11.20 PSEUDOGOUT: ICD-10-CM

## 2018-07-07 DIAGNOSIS — M79.641 PAIN IN BOTH HANDS: ICD-10-CM

## 2018-07-09 ENCOUNTER — TELEPHONE (OUTPATIENT)
Dept: RHEUMATOLOGY | Facility: CLINIC | Age: 73
End: 2018-07-09

## 2018-07-09 RX ORDER — COLCHICINE 0.6 MG/1
TABLET, FILM COATED ORAL
Qty: 60 TABLET | Refills: 1 | Status: SHIPPED | OUTPATIENT
Start: 2018-07-09 | End: 2018-11-28 | Stop reason: SDUPTHER

## 2018-07-09 NOTE — TELEPHONE ENCOUNTER
Received message that patient's neck pain has worsened despite 5 session of physical therapy.  Patient would like to discuss MRI or trying another physical therapy.  Called the patient at  and left a message for the patient to send any e-mail with the best times to reach him.    Patient later called back and reports deep tissue massage in PT worsened his neck pain and that instead of just right sided pain but has left sided pain also.  He would like to find another PT that takes his insurance and if no improvement proceed with MRI. Will fax to new PT once info received.

## 2018-08-14 ENCOUNTER — PATIENT MESSAGE (OUTPATIENT)
Dept: NEPHROLOGY | Facility: CLINIC | Age: 73
End: 2018-08-14

## 2018-09-17 ENCOUNTER — LAB VISIT (OUTPATIENT)
Dept: LAB | Facility: HOSPITAL | Age: 73
End: 2018-09-17
Attending: INTERNAL MEDICINE
Payer: MEDICARE

## 2018-09-17 ENCOUNTER — OFFICE VISIT (OUTPATIENT)
Dept: RHEUMATOLOGY | Facility: CLINIC | Age: 73
End: 2018-09-17
Payer: MEDICARE

## 2018-09-17 VITALS
DIASTOLIC BLOOD PRESSURE: 72 MMHG | HEIGHT: 67 IN | WEIGHT: 165.19 LBS | HEART RATE: 52 BPM | SYSTOLIC BLOOD PRESSURE: 145 MMHG | BODY MASS INDEX: 25.93 KG/M2

## 2018-09-17 DIAGNOSIS — E79.0 HYPERURICEMIA: ICD-10-CM

## 2018-09-17 DIAGNOSIS — N18.2 CKD (CHRONIC KIDNEY DISEASE), STAGE II: ICD-10-CM

## 2018-09-17 DIAGNOSIS — M79.641 PAIN IN BOTH HANDS: ICD-10-CM

## 2018-09-17 DIAGNOSIS — M79.642 PAIN IN BOTH HANDS: ICD-10-CM

## 2018-09-17 DIAGNOSIS — R53.83 FATIGUE, UNSPECIFIED TYPE: ICD-10-CM

## 2018-09-17 DIAGNOSIS — Z11.4 ENCOUNTER FOR SCREENING FOR HIV: ICD-10-CM

## 2018-09-17 DIAGNOSIS — Z87.39 PERSONAL HISTORY OF CALCIUM PYROPHOSPHATE DEPOSITION DISEASE (CPPD): Primary | ICD-10-CM

## 2018-09-17 DIAGNOSIS — Z87.39 PERSONAL HISTORY OF CALCIUM PYROPHOSPHATE DEPOSITION DISEASE (CPPD): ICD-10-CM

## 2018-09-17 DIAGNOSIS — L40.9 PSORIASIS: ICD-10-CM

## 2018-09-17 LAB
ALBUMIN SERPL BCP-MCNC: 4.1 G/DL
ALP SERPL-CCNC: 95 U/L
ALT SERPL W/O P-5'-P-CCNC: 25 U/L
AST SERPL-CCNC: 20 U/L
BASOPHILS # BLD AUTO: 0.03 K/UL
BASOPHILS NFR BLD: 0.6 %
BILIRUB DIRECT SERPL-MCNC: 0.3 MG/DL
BILIRUB SERPL-MCNC: 0.8 MG/DL
CRP SERPL-MCNC: 0.8 MG/L
DIFFERENTIAL METHOD: ABNORMAL
EOSINOPHIL # BLD AUTO: 0.2 K/UL
EOSINOPHIL NFR BLD: 3.5 %
ERYTHROCYTE [DISTWIDTH] IN BLOOD BY AUTOMATED COUNT: 13 %
ERYTHROCYTE [SEDIMENTATION RATE] IN BLOOD BY WESTERGREN METHOD: 7 MM/HR
HCT VFR BLD AUTO: 39.3 %
HGB BLD-MCNC: 12.9 G/DL
IMM GRANULOCYTES # BLD AUTO: 0.03 K/UL
IMM GRANULOCYTES NFR BLD AUTO: 0.6 %
LYMPHOCYTES # BLD AUTO: 1.4 K/UL
LYMPHOCYTES NFR BLD: 26 %
MCH RBC QN AUTO: 30.4 PG
MCHC RBC AUTO-ENTMCNC: 32.8 G/DL
MCV RBC AUTO: 93 FL
MONOCYTES # BLD AUTO: 0.4 K/UL
MONOCYTES NFR BLD: 7.9 %
NEUTROPHILS # BLD AUTO: 3.3 K/UL
NEUTROPHILS NFR BLD: 61.4 %
NRBC BLD-RTO: 0 /100 WBC
PLATELET # BLD AUTO: 117 K/UL
PMV BLD AUTO: 13.7 FL
PROT SERPL-MCNC: 7 G/DL
RBC # BLD AUTO: 4.24 M/UL
URATE SERPL-MCNC: 9.5 MG/DL
WBC # BLD AUTO: 5.42 K/UL

## 2018-09-17 PROCEDURE — 99214 OFFICE O/P EST MOD 30 MIN: CPT | Mod: PBBFAC | Performed by: INTERNAL MEDICINE

## 2018-09-17 PROCEDURE — 85651 RBC SED RATE NONAUTOMATED: CPT | Mod: PO

## 2018-09-17 PROCEDURE — 99999 PR PBB SHADOW E&M-EST. PATIENT-LVL IV: CPT | Mod: PBBFAC,,, | Performed by: INTERNAL MEDICINE

## 2018-09-17 PROCEDURE — 80076 HEPATIC FUNCTION PANEL: CPT

## 2018-09-17 PROCEDURE — 3077F SYST BP >= 140 MM HG: CPT | Mod: CPTII,,, | Performed by: INTERNAL MEDICINE

## 2018-09-17 PROCEDURE — 85025 COMPLETE CBC W/AUTO DIFF WBC: CPT

## 2018-09-17 PROCEDURE — 99214 OFFICE O/P EST MOD 30 MIN: CPT | Mod: S$PBB,,, | Performed by: INTERNAL MEDICINE

## 2018-09-17 PROCEDURE — 86140 C-REACTIVE PROTEIN: CPT

## 2018-09-17 PROCEDURE — 3078F DIAST BP <80 MM HG: CPT | Mod: CPTII,,, | Performed by: INTERNAL MEDICINE

## 2018-09-17 PROCEDURE — 84550 ASSAY OF BLOOD/URIC ACID: CPT

## 2018-09-17 PROCEDURE — 3288F FALL RISK ASSESSMENT DOCD: CPT | Mod: CPTII,,, | Performed by: INTERNAL MEDICINE

## 2018-09-17 PROCEDURE — 80074 ACUTE HEPATITIS PANEL: CPT

## 2018-09-17 PROCEDURE — 1100F PTFALLS ASSESS-DOCD GE2>/YR: CPT | Mod: CPTII,,, | Performed by: INTERNAL MEDICINE

## 2018-09-17 PROCEDURE — 86703 HIV-1/HIV-2 1 RESULT ANTBDY: CPT

## 2018-09-17 ASSESSMENT — ROUTINE ASSESSMENT OF PATIENT INDEX DATA (RAPID3)
WHEN YOU AWAKENED IN THE MORNING OVER THE LAST WEEK, PLEASE INDICATE THE AMOUNT OF TIME IT TAKES UNTIL YOU ARE AS LIMBER AS YOU WILL BE FOR THE DAY: 1 HOUR
PAIN SCORE: 7
FATIGUE SCORE: 4
MDHAQ FUNCTION SCORE: .5
PSYCHOLOGICAL DISTRESS SCORE: 1.1
AM STIFFNESS SCORE: 1, YES
TOTAL RAPID3 SCORE: 4.56
PATIENT GLOBAL ASSESSMENT SCORE: 5

## 2018-09-17 NOTE — PATIENT INSTRUCTIONS
Possible diagnoses include: osteoarthritis, pseudogout, gout and psoriatic arthritis.    We will lower the uric acid level which is elevated in gout with allopurinol 100 mg daily after level done today.    Continue colchicine daily which helps with gout and pseudogout.    Will consider other treatments if no improvement.

## 2018-09-17 NOTE — PROGRESS NOTES
"Subjective:       Patient ID: Rojelio Dye Sr. is a 73 y.o. male.    Chief Complaint: Neck pain    HPI:  Rojelio Dye Sr. is a 73 y.o. 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       Last visit 9/2013 for +RF but normal joint scan and inflammatory markers.  Consulted Dr. Olmstead who did surgeries on both feet.  Left foot now with infection.  Right foot has worsened after surgery.   Trouble with wound healing.  In past had to have PICC line for antibiotics.     Saw Dr. Olmstead couple weeks ago and he wants to pin another toe.  Now with infection in toe but antibiotics did not help.       Dr. Elijah Espinal orth[facility] on [facility] injects knees with (last 1/2018).      Interval History:  Tried therapy at one place and it was too rough.  Switched to different PT for neck and it helps but only for the day.  Tried massage, dry needling and exercises in PT but still with pain.  Pain is 7/10 ache that goes down into left arm.   Nothing worsens and activity worsens pain.     Notes improvement in stomach pain and diarrhea with lowering colchicine to once a day.    Will get knees injected by Dr. Elijah Pierson in [place].    Morning stiffness in 1 hour.       Review of Systems   Constitutional: Positive for fatigue.   HENT: Negative.    Eyes: Negative.    Respiratory: Negative.    Cardiovascular: Negative.    Gastrointestinal: Negative.    Endocrine: Negative.    Genitourinary: Negative.    Musculoskeletal: Positive for arthralgias.   Skin: Negative.    Allergic/Immunologic: Negative.    Neurological: Negative.    Hematological: Negative.    Psychiatric/Behavioral: Negative.          Objective:   BP (!) 145/72   Pulse (!) 52   Ht 5' 7" (1.702 m)   Wt 74.9 kg (165 lb 3.2 oz)   BMI 25.87 kg/m²      Physical Exam   Constitutional: He is oriented to person, place, and time and well-developed, well-nourished, and in no distress.   HENT:   Head: Normocephalic and atraumatic. "   Eyes: Conjunctivae and EOM are normal.   Neck: Neck supple.   Cardiovascular: Normal rate, regular rhythm and normal heart sounds.    Pulmonary/Chest: Effort normal and breath sounds normal.   Abdominal: Soft. Bowel sounds are normal.   Neurological: He is alert and oriented to person, place, and time. Gait normal.   Skin: Skin is warm and dry. Rash noted.     Psoriatic like rash on elbows   Psychiatric: Mood and affect normal.   Musculoskeletal:   28 joint count: 3 swollen (right 3rd PIP and right 2nd MCP and left 5th PIP) and 3 tender (same)             LABS    Component      Latest Ref Rng & Units 9/11/2018 6/13/2018 5/7/2018   WBC      3.90 - 12.70 K/uL   6.29   RBC      4.60 - 6.20 M/uL   4.17 (L)   Hemoglobin      14.0 - 18.0 g/dL   12.7 (L)   Hematocrit      40.0 - 54.0 %   37.6 (L)   MCV      82 - 98 fL   90   MCH      27.0 - 31.0 pg   30.5   MCHC      32.0 - 36.0 g/dL   33.8   RDW      11.5 - 14.5 %   13.5   Platelets      150 - 350 K/uL   120 (L)   MPV      9.2 - 12.9 fL   12.5   Immature Granulocytes      0.0 - 0.5 %   0.6 (H)   Gran # (ANC)      1.8 - 7.7 K/uL   4.2   Immature Grans (Abs)      0.00 - 0.04 K/uL   0.04   Lymph #      1.0 - 4.8 K/uL   1.0   Mono #      0.3 - 1.0 K/uL   0.9   Eos #      0.0 - 0.5 K/uL   0.1   Baso #      0.00 - 0.20 K/uL   0.04   nRBC      0 /100 WBC   0   Gran%      38.0 - 73.0 %   67.1   Lymph%      18.0 - 48.0 %   16.5 (L)   Mono%      4.0 - 15.0 %   13.5   Eosinophil%      0.0 - 8.0 %   1.7   Basophil%      0.0 - 1.9 %   0.6   Differential Method         Automated   Sodium      136 - 145 mmol/L 139  140   Potassium      3.5 - 5.1 mmol/L 4.8  5.3 (H)   Chloride      95 - 110 mmol/L 109  111 (H)   CO2      22 - 31 mmol/L 20 (L)  22 (L)   Glucose      70 - 110 mg/dL 129 (H)  87   BUN, Bld      9 - 21 mg/dL 45 (H)  47 (H)   Creatinine      0.50 - 1.40 mg/dL 1.81 (H)  1.9 (H)   Calcium      8.4 - 10.2 mg/dL 9.6  9.3   Total Protein      6.0 - 8.4 g/dL   6.6   Albumin       3.5 - 5.2 g/dL 4.0  3.9   Total Bilirubin      0.1 - 1.0 mg/dL   0.9   Alkaline Phosphatase      55 - 135 U/L   94   AST      10 - 40 U/L   21   ALT      10 - 44 U/L   24   Anion Gap      8 - 16 mmol/L 10  7 (L)   eGFR if African American      >60 mL/min/1.73 m:2 43 (A)  40.4 (A)   eGFR if non African American      >60 mL/min/1.73 m:2 37 (A)  34.9 (A)   Phosphorus      2.7 - 4.5 mg/dL 4.2     Specimen UA       Urine, Unspecified     Color, UA      Yellow, Straw, Pamela Yellow     Appearance, UA      Clear Clear     pH, UA      5.0 - 8.0 5.0     Specific Gravity, UA      1.005 - 1.030 1.020     Protein, UA      Negative Negative     Glucose, UA      Negative Negative     Ketones, UA      Negative Negative     Bilirubin (UA)      Negative Negative     Occult Blood UA      Negative Negative     Nitrite, UA      Negative Negative     Urobilinogen, UA      <2.0 EU/dL 0.2     Leukocytes, UA      Negative Negative     Cholesterol      120 - 199 mg/dL  116 (L)    Triglycerides      30 - 150 mg/dL  198 (H)    HDL      40 - 75 mg/dL  30 (L)    LDL Cholesterol      63.0 - 159.0 mg/dL  46.4 (L)    HDL/Chol Ratio      20.0 - 50.0 %  25.9    Total Cholesterol/HDL Ratio      2.0 - 5.0  3.9    Non-HDL Cholesterol      mg/dL  86    Microalbum.,U,Random      ug/mL  55.2    Creatinine, Random Ur      23.0 - 375.0 mg/dL 95.6 85.6    Microalb Creat Ratio      0.0 - 30.0 ug/mg  64.5 (H)    Protein, Urine Random      0 - 11 mg/dL 14 (H)     Prot/Creat Ratio, Ur      15.0 - 68.0 mg/g 146.4 (H)     Hemoglobin A1C      0.0 - 5.6 %  5.8 (H)    Estimated Avg Glucose      68 - 131 mg/dL  120    CRP      0.0 - 8.2 mg/L   2.6   Sed Rate      0 - 10 mm/Hr   15 (H)   Uric Acid      3.4 - 7.0 mg/dL   9.5 (H)   CPK      20 - 200 U/L   192   CCP Antibodies      <5.0 U/mL   0.9   Rheumatoid Factor      0.0 - 15.0 IU/mL   12.0   PSA, SCREEN      0.00 - 4.00 ng/mL  3.1    PTH      9.0 - 77.0 pg/mL 51.0          Assessment:       1. Arthralgias. OA vs. RA  vs PsA vs Polyarticular Gout; positive RF but in past but now negative normal ESR and CRP previously; erosions on X-ray right 1st MTP and hands; Now with bilateral hand pain.  Resolved  infection left 5th toe.  2. History of osteoarthrosis in multiple joints. Erosive OA on x-ray  3. Chondrocalinosis  4. Positive rheumatoid factor  5. Diabetes.  Not on any medications.  Tragenta caused glucose to drop  6. Schwanoma  7. Hyperuricemia  8.  Psoriasis diagnosed last year  9.  Stage 3 renal disease    Plan:       1.  Patient will see outside pain management outside [facility].  2.  Labs including uric acid  3.  Follow with Dr. Olmstead  4. Continue colchicine and consider adding allopurinol 100 mg daily.  Reviewed medications for interactions.    5.  If no improvement with lowering uric acid consider DMARD therapy for possible PsA. Concerned about risk of immunosuppression with history of foot ulcers.      RTO 4 months/prn

## 2018-09-18 ENCOUNTER — OFFICE VISIT (OUTPATIENT)
Dept: NEPHROLOGY | Facility: CLINIC | Age: 73
End: 2018-09-18
Payer: MEDICARE

## 2018-09-18 ENCOUNTER — PATIENT MESSAGE (OUTPATIENT)
Dept: RHEUMATOLOGY | Facility: CLINIC | Age: 73
End: 2018-09-18

## 2018-09-18 VITALS
OXYGEN SATURATION: 98 % | DIASTOLIC BLOOD PRESSURE: 66 MMHG | HEIGHT: 67 IN | SYSTOLIC BLOOD PRESSURE: 116 MMHG | HEART RATE: 66 BPM | WEIGHT: 166 LBS | BODY MASS INDEX: 26.06 KG/M2

## 2018-09-18 DIAGNOSIS — Z87.39 PERSONAL HISTORY OF CALCIUM PYROPHOSPHATE DEPOSITION DISEASE (CPPD): ICD-10-CM

## 2018-09-18 DIAGNOSIS — N18.30 CHRONIC KIDNEY DISEASE, STAGE III (MODERATE): Primary | ICD-10-CM

## 2018-09-18 DIAGNOSIS — E79.0 HYPERURICEMIA: ICD-10-CM

## 2018-09-18 DIAGNOSIS — N20.0 CALCULUS OF KIDNEY: ICD-10-CM

## 2018-09-18 DIAGNOSIS — M11.20 PSEUDOGOUT: Primary | ICD-10-CM

## 2018-09-18 DIAGNOSIS — M15.9 PRIMARY OSTEOARTHRITIS INVOLVING MULTIPLE JOINTS: ICD-10-CM

## 2018-09-18 DIAGNOSIS — R76.8 RHEUMATOID FACTOR POSITIVE: ICD-10-CM

## 2018-09-18 DIAGNOSIS — I10 BENIGN ESSENTIAL HYPERTENSION: ICD-10-CM

## 2018-09-18 LAB
HAV IGM SERPL QL IA: NEGATIVE
HBV CORE IGM SERPL QL IA: NEGATIVE
HBV SURFACE AG SERPL QL IA: NEGATIVE
HCV AB SERPL QL IA: NEGATIVE
HIV 1+2 AB+HIV1 P24 AG SERPL QL IA: NEGATIVE

## 2018-09-18 PROCEDURE — 3074F SYST BP LT 130 MM HG: CPT | Mod: CPTII,,, | Performed by: INTERNAL MEDICINE

## 2018-09-18 PROCEDURE — 99213 OFFICE O/P EST LOW 20 MIN: CPT | Mod: PBBFAC,PO | Performed by: INTERNAL MEDICINE

## 2018-09-18 PROCEDURE — 99999 PR PBB SHADOW E&M-EST. PATIENT-LVL III: CPT | Mod: PBBFAC,,, | Performed by: INTERNAL MEDICINE

## 2018-09-18 PROCEDURE — 3078F DIAST BP <80 MM HG: CPT | Mod: CPTII,,, | Performed by: INTERNAL MEDICINE

## 2018-09-18 PROCEDURE — 99214 OFFICE O/P EST MOD 30 MIN: CPT | Mod: S$PBB,,, | Performed by: INTERNAL MEDICINE

## 2018-09-18 PROCEDURE — 1101F PT FALLS ASSESS-DOCD LE1/YR: CPT | Mod: CPTII,,, | Performed by: INTERNAL MEDICINE

## 2018-09-18 NOTE — PROGRESS NOTES
"Subjective:       Patient ID: Rojelio Dye Sr. is a 73 y.o. White male who presents for return patient evaluation for chronic renal failure.    He has no uremic or urinary symptoms and is in his usual state of health.  There have been no recent illnesses, hospitalizations or procedures.  He saw Rheumatology yesterday.  He is on naprosyn daily.      Review of Systems   Constitutional: Negative for appetite change, chills and fever.   HENT: Negative for congestion.    Eyes: Negative for visual disturbance.   Respiratory: Negative for cough and shortness of breath.    Cardiovascular: Negative for chest pain and leg swelling.   Gastrointestinal: Negative for abdominal pain, diarrhea, nausea and vomiting.   Genitourinary: Negative for difficulty urinating, dysuria and hematuria.   Musculoskeletal: Positive for arthralgias (hands and feet), back pain, gait problem and neck pain. Negative for myalgias.   Skin: Negative for rash.   Neurological: Negative for headaches.   Hematological: Bruises/bleeds easily.   Psychiatric/Behavioral: Negative for sleep disturbance.       The past medical, family and social histories were reviewed for this encounter.     /66 (BP Location: Left arm, Patient Position: Sitting)   Pulse 66   Ht 5' 7" (1.702 m)   Wt 75.3 kg (166 lb 0.1 oz)   SpO2 98%   BMI 26.00 kg/m²     Objective:      Physical Exam   Constitutional: He appears well-developed and well-nourished. No distress.   HENT:   Head: Normocephalic and atraumatic.   Eyes: Conjunctivae are normal. No scleral icterus.   Neck: Normal range of motion. No JVD present.   Cardiovascular: Normal rate, regular rhythm and normal heart sounds. Exam reveals no gallop and no friction rub.   No murmur heard.  Pulmonary/Chest: Effort normal and breath sounds normal. No respiratory distress. He has no wheezes.   Abdominal: Soft. Bowel sounds are normal. He exhibits no distension. There is no tenderness.   Musculoskeletal: He exhibits no " edema.   Skin: Skin is warm and dry. No rash noted.   Psychiatric: He has a normal mood and affect.   Vitals reviewed.      Assessment:       1. Chronic kidney disease, stage III (moderate)    2. Benign essential hypertension    3. Calculus of kidney        Plan:   Return to clinic in 6 months.  Labs for today include rp, ua, pth, upc.  Baseline creatinine is 1.5-2.0 since 2015.  PTH is 51 with a calcium of 9.5.  Uric acid is 9.5.  I suspect he has had some change in his renal function due to his chronic use of NSAIDS.   Blood pressure is controlled on the current regimen.  Continue current medications as prescribed and reviewed.

## 2018-09-19 ENCOUNTER — PATIENT MESSAGE (OUTPATIENT)
Dept: RHEUMATOLOGY | Facility: CLINIC | Age: 73
End: 2018-09-19

## 2018-09-19 DIAGNOSIS — M79.642 PAIN IN BOTH HANDS: ICD-10-CM

## 2018-09-19 DIAGNOSIS — E79.0 HYPERURICEMIA: ICD-10-CM

## 2018-09-19 DIAGNOSIS — M11.20 PSEUDOGOUT: ICD-10-CM

## 2018-09-19 DIAGNOSIS — L40.9 PSORIASIS: ICD-10-CM

## 2018-09-19 DIAGNOSIS — R76.8 RHEUMATOID FACTOR POSITIVE: ICD-10-CM

## 2018-09-19 DIAGNOSIS — M79.641 PAIN IN BOTH HANDS: ICD-10-CM

## 2018-09-19 DIAGNOSIS — Z87.39 PERSONAL HISTORY OF CALCIUM PYROPHOSPHATE DEPOSITION DISEASE (CPPD): Primary | ICD-10-CM

## 2018-09-20 ENCOUNTER — PATIENT MESSAGE (OUTPATIENT)
Dept: RHEUMATOLOGY | Facility: CLINIC | Age: 73
End: 2018-09-20

## 2018-09-20 DIAGNOSIS — E79.0 HYPERURICEMIA: Primary | ICD-10-CM

## 2018-09-20 DIAGNOSIS — M10.9 GOUT, ARTHRITIS: ICD-10-CM

## 2018-09-20 RX ORDER — ALLOPURINOL 100 MG/1
100 TABLET ORAL DAILY
Qty: 30 TABLET | Refills: 1 | Status: SHIPPED | OUTPATIENT
Start: 2018-09-20 | End: 2018-10-23 | Stop reason: SDUPTHER

## 2018-10-17 ENCOUNTER — LAB VISIT (OUTPATIENT)
Dept: LAB | Facility: HOSPITAL | Age: 73
End: 2018-10-17
Attending: INTERNAL MEDICINE
Payer: MEDICARE

## 2018-10-17 DIAGNOSIS — R76.8 RHEUMATOID FACTOR POSITIVE: ICD-10-CM

## 2018-10-17 DIAGNOSIS — E79.0 HYPERURICEMIA: ICD-10-CM

## 2018-10-17 DIAGNOSIS — Z87.39 PERSONAL HISTORY OF CALCIUM PYROPHOSPHATE DEPOSITION DISEASE (CPPD): ICD-10-CM

## 2018-10-17 DIAGNOSIS — M11.20 PSEUDOGOUT: ICD-10-CM

## 2018-10-17 DIAGNOSIS — M15.9 PRIMARY OSTEOARTHRITIS INVOLVING MULTIPLE JOINTS: ICD-10-CM

## 2018-10-17 LAB
ALBUMIN SERPL BCP-MCNC: 4 G/DL
ALP SERPL-CCNC: 87 U/L
ALT SERPL W/O P-5'-P-CCNC: 30 U/L
ANION GAP SERPL CALC-SCNC: 6 MMOL/L
AST SERPL-CCNC: 24 U/L
BASOPHILS # BLD AUTO: 0.04 K/UL
BASOPHILS NFR BLD: 0.8 %
BILIRUB SERPL-MCNC: 0.8 MG/DL
BUN SERPL-MCNC: 36 MG/DL
CALCIUM SERPL-MCNC: 9.6 MG/DL
CHLORIDE SERPL-SCNC: 109 MMOL/L
CO2 SERPL-SCNC: 25 MMOL/L
CREAT SERPL-MCNC: 1.9 MG/DL
CRP SERPL-MCNC: 0.8 MG/L
DIFFERENTIAL METHOD: ABNORMAL
EOSINOPHIL # BLD AUTO: 0.2 K/UL
EOSINOPHIL NFR BLD: 4.6 %
ERYTHROCYTE [DISTWIDTH] IN BLOOD BY AUTOMATED COUNT: 12.8 %
ERYTHROCYTE [SEDIMENTATION RATE] IN BLOOD BY WESTERGREN METHOD: 17 MM/HR
EST. GFR  (AFRICAN AMERICAN): 40.1 ML/MIN/1.73 M^2
EST. GFR  (NON AFRICAN AMERICAN): 34.7 ML/MIN/1.73 M^2
GLUCOSE SERPL-MCNC: 134 MG/DL
HCT VFR BLD AUTO: 35.9 %
HGB BLD-MCNC: 12.4 G/DL
IMM GRANULOCYTES # BLD AUTO: 0.04 K/UL
IMM GRANULOCYTES NFR BLD AUTO: 0.8 %
LYMPHOCYTES # BLD AUTO: 1.3 K/UL
LYMPHOCYTES NFR BLD: 27.1 %
MCH RBC QN AUTO: 31.3 PG
MCHC RBC AUTO-ENTMCNC: 34.5 G/DL
MCV RBC AUTO: 91 FL
MONOCYTES # BLD AUTO: 0.6 K/UL
MONOCYTES NFR BLD: 11.6 %
NEUTROPHILS # BLD AUTO: 2.7 K/UL
NEUTROPHILS NFR BLD: 55.1 %
NRBC BLD-RTO: 0 /100 WBC
PLATELET # BLD AUTO: 112 K/UL
PMV BLD AUTO: 13.5 FL
POTASSIUM SERPL-SCNC: 4.9 MMOL/L
PROT SERPL-MCNC: 6.9 G/DL
RBC # BLD AUTO: 3.96 M/UL
SODIUM SERPL-SCNC: 140 MMOL/L
URATE SERPL-MCNC: 6.9 MG/DL
WBC # BLD AUTO: 4.83 K/UL

## 2018-10-17 PROCEDURE — 84550 ASSAY OF BLOOD/URIC ACID: CPT

## 2018-10-17 PROCEDURE — 86140 C-REACTIVE PROTEIN: CPT

## 2018-10-17 PROCEDURE — 80053 COMPREHEN METABOLIC PANEL: CPT

## 2018-10-17 PROCEDURE — 85651 RBC SED RATE NONAUTOMATED: CPT | Mod: PO

## 2018-10-17 PROCEDURE — 85025 COMPLETE CBC W/AUTO DIFF WBC: CPT

## 2018-10-17 PROCEDURE — 36415 COLL VENOUS BLD VENIPUNCTURE: CPT | Mod: PO

## 2018-10-18 ENCOUNTER — PATIENT MESSAGE (OUTPATIENT)
Dept: RHEUMATOLOGY | Facility: CLINIC | Age: 73
End: 2018-10-18

## 2018-10-23 DIAGNOSIS — E79.0 HYPERURICEMIA: ICD-10-CM

## 2018-10-23 DIAGNOSIS — M10.9 GOUT, ARTHRITIS: ICD-10-CM

## 2018-10-23 RX ORDER — ALLOPURINOL 100 MG/1
100 TABLET ORAL DAILY
Qty: 30 TABLET | Refills: 1 | Status: SHIPPED | OUTPATIENT
Start: 2018-10-23 | End: 2019-01-17 | Stop reason: SDUPTHER

## 2018-10-30 ENCOUNTER — PATIENT MESSAGE (OUTPATIENT)
Dept: RHEUMATOLOGY | Facility: CLINIC | Age: 73
End: 2018-10-30

## 2018-10-30 DIAGNOSIS — E79.0 HYPERURICEMIA: ICD-10-CM

## 2018-10-30 DIAGNOSIS — N18.2 CKD (CHRONIC KIDNEY DISEASE), STAGE II: ICD-10-CM

## 2018-10-30 DIAGNOSIS — M11.20 PSEUDOGOUT: Primary | ICD-10-CM

## 2018-11-20 ENCOUNTER — LAB VISIT (OUTPATIENT)
Dept: LAB | Facility: HOSPITAL | Age: 73
End: 2018-11-20
Attending: INTERNAL MEDICINE
Payer: MEDICARE

## 2018-11-20 DIAGNOSIS — M11.20 PSEUDOGOUT: ICD-10-CM

## 2018-11-20 DIAGNOSIS — N18.2 CKD (CHRONIC KIDNEY DISEASE), STAGE II: ICD-10-CM

## 2018-11-20 DIAGNOSIS — E79.0 HYPERURICEMIA: ICD-10-CM

## 2018-11-20 LAB
ALBUMIN SERPL BCP-MCNC: 4.1 G/DL
ALP SERPL-CCNC: 94 U/L
ALT SERPL W/O P-5'-P-CCNC: 21 U/L
ANION GAP SERPL CALC-SCNC: 8 MMOL/L
AST SERPL-CCNC: 24 U/L
BASOPHILS # BLD AUTO: 0.04 K/UL
BASOPHILS NFR BLD: 0.8 %
BILIRUB SERPL-MCNC: 0.6 MG/DL
BUN SERPL-MCNC: 58 MG/DL
CALCIUM SERPL-MCNC: 9.5 MG/DL
CHLORIDE SERPL-SCNC: 109 MMOL/L
CO2 SERPL-SCNC: 23 MMOL/L
CREAT SERPL-MCNC: 2.3 MG/DL
CRP SERPL-MCNC: 1.6 MG/L
DIFFERENTIAL METHOD: ABNORMAL
EOSINOPHIL # BLD AUTO: 0.1 K/UL
EOSINOPHIL NFR BLD: 2.3 %
ERYTHROCYTE [DISTWIDTH] IN BLOOD BY AUTOMATED COUNT: 13.1 %
ERYTHROCYTE [SEDIMENTATION RATE] IN BLOOD BY WESTERGREN METHOD: 13 MM/HR
EST. GFR  (AFRICAN AMERICAN): 31.8 ML/MIN/1.73 M^2
EST. GFR  (NON AFRICAN AMERICAN): 27.5 ML/MIN/1.73 M^2
GLUCOSE SERPL-MCNC: 96 MG/DL
HCT VFR BLD AUTO: 38.4 %
HGB BLD-MCNC: 12.8 G/DL
IMM GRANULOCYTES # BLD AUTO: 0.02 K/UL
IMM GRANULOCYTES NFR BLD AUTO: 0.4 %
LYMPHOCYTES # BLD AUTO: 1.2 K/UL
LYMPHOCYTES NFR BLD: 23.3 %
MCH RBC QN AUTO: 30.1 PG
MCHC RBC AUTO-ENTMCNC: 33.3 G/DL
MCV RBC AUTO: 90 FL
MONOCYTES # BLD AUTO: 0.6 K/UL
MONOCYTES NFR BLD: 11.1 %
NEUTROPHILS # BLD AUTO: 3.3 K/UL
NEUTROPHILS NFR BLD: 62.1 %
NRBC BLD-RTO: 0 /100 WBC
PLATELET # BLD AUTO: 142 K/UL
PMV BLD AUTO: 13.4 FL
POTASSIUM SERPL-SCNC: 5.1 MMOL/L
PROT SERPL-MCNC: 7 G/DL
RBC # BLD AUTO: 4.25 M/UL
SODIUM SERPL-SCNC: 140 MMOL/L
URATE SERPL-MCNC: 7.7 MG/DL
WBC # BLD AUTO: 5.23 K/UL

## 2018-11-20 PROCEDURE — 80053 COMPREHEN METABOLIC PANEL: CPT

## 2018-11-20 PROCEDURE — 85025 COMPLETE CBC W/AUTO DIFF WBC: CPT

## 2018-11-20 PROCEDURE — 86140 C-REACTIVE PROTEIN: CPT

## 2018-11-20 PROCEDURE — 36415 COLL VENOUS BLD VENIPUNCTURE: CPT | Mod: PO

## 2018-11-20 PROCEDURE — 85651 RBC SED RATE NONAUTOMATED: CPT | Mod: PO

## 2018-11-20 PROCEDURE — 84550 ASSAY OF BLOOD/URIC ACID: CPT

## 2018-11-21 ENCOUNTER — PATIENT MESSAGE (OUTPATIENT)
Dept: RHEUMATOLOGY | Facility: CLINIC | Age: 73
End: 2018-11-21

## 2018-11-21 NOTE — TELEPHONE ENCOUNTER
Patient informed of labs.  Patient reports having medial branch block recently.    He is stable from hands and feet.  He is taking topical cream that help hands and feet.  He takes colchine 0.6 mg alternating with 0.3 mg and allopurinol 100 mg daily.  Will continue with current dose unless nephrology objects.

## 2018-11-21 NOTE — TELEPHONE ENCOUNTER
Hello.  His allopurinol and colchicine doses are acceptable.    Doreen, please get him in to see me in the next 2-3 weeks.  Thanks

## 2018-11-28 DIAGNOSIS — M79.641 PAIN IN BOTH HANDS: ICD-10-CM

## 2018-11-28 DIAGNOSIS — M79.642 PAIN IN BOTH HANDS: ICD-10-CM

## 2018-11-28 DIAGNOSIS — M11.20 PSEUDOGOUT: ICD-10-CM

## 2018-11-28 RX ORDER — COLCHICINE 0.6 MG/1
TABLET, FILM COATED ORAL
Qty: 60 TABLET | Refills: 1 | Status: ON HOLD | OUTPATIENT
Start: 2018-11-28 | End: 2019-01-08 | Stop reason: HOSPADM

## 2018-12-14 ENCOUNTER — OFFICE VISIT (OUTPATIENT)
Dept: NEPHROLOGY | Facility: CLINIC | Age: 73
End: 2018-12-14
Payer: MEDICARE

## 2018-12-14 ENCOUNTER — LAB VISIT (OUTPATIENT)
Dept: LAB | Facility: HOSPITAL | Age: 73
End: 2018-12-14
Attending: INTERNAL MEDICINE
Payer: MEDICARE

## 2018-12-14 VITALS
SYSTOLIC BLOOD PRESSURE: 82 MMHG | WEIGHT: 146.19 LBS | BODY MASS INDEX: 22.94 KG/M2 | HEART RATE: 58 BPM | DIASTOLIC BLOOD PRESSURE: 58 MMHG | HEIGHT: 67 IN

## 2018-12-14 DIAGNOSIS — N18.30 CHRONIC KIDNEY DISEASE, STAGE III (MODERATE): ICD-10-CM

## 2018-12-14 DIAGNOSIS — N18.30 CHRONIC KIDNEY DISEASE, STAGE III (MODERATE): Primary | ICD-10-CM

## 2018-12-14 DIAGNOSIS — I10 BENIGN ESSENTIAL HYPERTENSION: ICD-10-CM

## 2018-12-14 DIAGNOSIS — N20.0 CALCULUS OF KIDNEY: ICD-10-CM

## 2018-12-14 LAB
ALBUMIN SERPL BCP-MCNC: 4 G/DL
ANION GAP SERPL CALC-SCNC: 10 MMOL/L
BUN SERPL-MCNC: 49 MG/DL
CALCIUM SERPL-MCNC: 9.9 MG/DL
CHLORIDE SERPL-SCNC: 107 MMOL/L
CO2 SERPL-SCNC: 23 MMOL/L
CREAT SERPL-MCNC: 2.2 MG/DL
EST. GFR  (AFRICAN AMERICAN): 33.6 ML/MIN/1.73 M^2
EST. GFR  (NON AFRICAN AMERICAN): 29.1 ML/MIN/1.73 M^2
GLUCOSE SERPL-MCNC: 113 MG/DL
PHOSPHATE SERPL-MCNC: 4.9 MG/DL
POTASSIUM SERPL-SCNC: 5 MMOL/L
PTH-INTACT SERPL-MCNC: 79 PG/ML
SODIUM SERPL-SCNC: 140 MMOL/L

## 2018-12-14 PROCEDURE — 1100F PTFALLS ASSESS-DOCD GE2>/YR: CPT | Mod: CPTII,S$GLB,, | Performed by: INTERNAL MEDICINE

## 2018-12-14 PROCEDURE — 99214 OFFICE O/P EST MOD 30 MIN: CPT | Mod: S$GLB,,, | Performed by: INTERNAL MEDICINE

## 2018-12-14 PROCEDURE — 99999 PR PBB SHADOW E&M-EST. PATIENT-LVL III: CPT | Mod: PBBFAC,,, | Performed by: INTERNAL MEDICINE

## 2018-12-14 PROCEDURE — 3078F DIAST BP <80 MM HG: CPT | Mod: CPTII,S$GLB,, | Performed by: INTERNAL MEDICINE

## 2018-12-14 PROCEDURE — 80069 RENAL FUNCTION PANEL: CPT

## 2018-12-14 PROCEDURE — 36415 COLL VENOUS BLD VENIPUNCTURE: CPT | Mod: PO

## 2018-12-14 PROCEDURE — 3074F SYST BP LT 130 MM HG: CPT | Mod: CPTII,S$GLB,, | Performed by: INTERNAL MEDICINE

## 2018-12-14 PROCEDURE — 83970 ASSAY OF PARATHORMONE: CPT

## 2018-12-14 PROCEDURE — 3288F FALL RISK ASSESSMENT DOCD: CPT | Mod: CPTII,S$GLB,, | Performed by: INTERNAL MEDICINE

## 2018-12-14 RX ORDER — TRAMADOL HYDROCHLORIDE 50 MG/1
TABLET ORAL
Refills: 1 | Status: ON HOLD | COMMUNITY
Start: 2018-12-12 | End: 2019-01-08 | Stop reason: HOSPADM

## 2018-12-14 RX ORDER — VITAMIN K2 40 MCG
500 TABLET ORAL 2 TIMES DAILY
COMMUNITY
End: 2019-08-20

## 2018-12-14 RX ORDER — ROPINIROLE 1 MG/1
3 TABLET, FILM COATED ORAL
COMMUNITY
Start: 2018-11-11 | End: 2020-02-18

## 2018-12-14 RX ORDER — LIDOCAINE AND PRILOCAINE 25; 25 MG/G; MG/G
CREAM TOPICAL
Status: ON HOLD | COMMUNITY
Start: 2018-11-02 | End: 2019-01-08 | Stop reason: HOSPADM

## 2018-12-14 RX ORDER — MONTELUKAST SODIUM 10 MG/1
TABLET ORAL
COMMUNITY
Start: 2018-10-19 | End: 2021-03-12 | Stop reason: CLARIF

## 2018-12-14 NOTE — PROGRESS NOTES
"Subjective:       Patient ID: Rojelio Dye Sr. is a 73 y.o. White male who presents for return patient evaluation for chronic renal failure.    He has felt poorly over the past two weeks with pain in his hands, shoulders, arms, knees, feet along with weakness, dizziness.  His appetite has been poor and he also reports some tremors.      Review of Systems   Constitutional: Positive for appetite change and unexpected weight change. Negative for chills and fever.   HENT: Negative for congestion.    Eyes: Negative for visual disturbance.   Respiratory: Negative for cough and shortness of breath.    Cardiovascular: Negative for chest pain and leg swelling.   Gastrointestinal: Negative for abdominal pain, diarrhea, nausea and vomiting.   Genitourinary: Negative for difficulty urinating, dysuria and hematuria.   Musculoskeletal: Positive for arthralgias (hands and feet), back pain, gait problem and neck pain. Negative for myalgias.   Skin: Negative for rash.   Neurological: Positive for dizziness, tremors, weakness and numbness. Negative for headaches.   Hematological: Bruises/bleeds easily.   Psychiatric/Behavioral: Negative for sleep disturbance.       The past medical, family and social histories were reviewed for this encounter.     BP (!) 82/58   Pulse (!) 58   Ht 5' 7" (1.702 m)   Wt 66.3 kg (146 lb 2.6 oz)   BMI 22.89 kg/m²     Objective:      Physical Exam   Constitutional: He appears well-developed. No distress.   Ill-appearing   HENT:   Head: Normocephalic and atraumatic.   Eyes: Conjunctivae are normal. No scleral icterus.   Neck: Normal range of motion. No JVD present.   Cardiovascular: Normal rate, regular rhythm and normal heart sounds. Exam reveals no gallop and no friction rub.   No murmur heard.  Pulmonary/Chest: Effort normal and breath sounds normal. No respiratory distress. He has no wheezes.   Abdominal: Soft. Bowel sounds are normal. He exhibits no distension. There is no tenderness. "   Musculoskeletal: He exhibits no edema.   Skin: Skin is warm and dry. No rash noted.   Psychiatric: He has a normal mood and affect.   Vitals reviewed.      Assessment:       1. Chronic kidney disease, stage III (moderate)    2. Benign essential hypertension    3. Calculus of kidney        Plan:   Return to clinic in 3 months.  Labs for today include rp, pth, uric aicd.  Baseline creatinine is 1.5-2.0 since 2015.  PTH is 51 with a calcium of 9.5.  Uric acid is 9.5.  I suspect he has had some change in his renal function due to his chronic use of NSAIDS and now hypotension.   Stop lisinopril.  I will send this note to Dr. Moore and Dr. Grajeda for their review.  He has had an acute change in status in the last two weeks.

## 2018-12-17 ENCOUNTER — PATIENT MESSAGE (OUTPATIENT)
Dept: RHEUMATOLOGY | Facility: CLINIC | Age: 73
End: 2018-12-17

## 2018-12-17 DIAGNOSIS — R76.8 RHEUMATOID FACTOR POSITIVE: Primary | ICD-10-CM

## 2018-12-17 DIAGNOSIS — E79.0 HYPERURICEMIA: ICD-10-CM

## 2018-12-17 DIAGNOSIS — M11.20 PSEUDOGOUT: ICD-10-CM

## 2018-12-18 RX ORDER — PREDNISONE 10 MG/1
40 TABLET ORAL DAILY
Qty: 20 TABLET | Refills: 0 | Status: SHIPPED | OUTPATIENT
Start: 2018-12-18 | End: 2018-12-20 | Stop reason: SDUPTHER

## 2018-12-18 NOTE — TELEPHONE ENCOUNTER
Spoke with patient and wife who report there is total body pain for one week.  Couple weeks ago pain increased.  Had knees injected that helped but now unable to eat and knee jerks involuntarily.  Numbness and tingling everywhere.  Walking barely with a walker.  Can not dress or bathe.  Saw Dr. Brandt and primary care doctor who did labs.  Patient to come in Thursday 9 am.  Trial of prednisone 40 mg daily.

## 2018-12-20 ENCOUNTER — PATIENT MESSAGE (OUTPATIENT)
Dept: RHEUMATOLOGY | Facility: CLINIC | Age: 73
End: 2018-12-20

## 2018-12-20 ENCOUNTER — OFFICE VISIT (OUTPATIENT)
Dept: RHEUMATOLOGY | Facility: CLINIC | Age: 73
End: 2018-12-20
Payer: MEDICARE

## 2018-12-20 ENCOUNTER — HOSPITAL ENCOUNTER (OUTPATIENT)
Dept: RADIOLOGY | Facility: HOSPITAL | Age: 73
Discharge: HOME | End: 2018-12-20
Attending: INTERNAL MEDICINE
Payer: MEDICARE

## 2018-12-20 VITALS
WEIGHT: 155 LBS | SYSTOLIC BLOOD PRESSURE: 165 MMHG | HEART RATE: 55 BPM | BODY MASS INDEX: 23.49 KG/M2 | HEIGHT: 68 IN | DIASTOLIC BLOOD PRESSURE: 74 MMHG

## 2018-12-20 DIAGNOSIS — M54.50 ACUTE RIGHT-SIDED LOW BACK PAIN WITHOUT SCIATICA: ICD-10-CM

## 2018-12-20 DIAGNOSIS — M11.20 PSEUDOGOUT: ICD-10-CM

## 2018-12-20 DIAGNOSIS — R25.3 JERKING: ICD-10-CM

## 2018-12-20 DIAGNOSIS — D36.10 SCHWANNOMA: ICD-10-CM

## 2018-12-20 DIAGNOSIS — E79.0 HYPERURICEMIA: ICD-10-CM

## 2018-12-20 DIAGNOSIS — M54.50 LOW BACK PAIN, NON-SPECIFIC: ICD-10-CM

## 2018-12-20 DIAGNOSIS — R20.2 PARESTHESIAS: ICD-10-CM

## 2018-12-20 DIAGNOSIS — R63.4 UNINTENTIONAL WEIGHT LOSS: ICD-10-CM

## 2018-12-20 DIAGNOSIS — R61 NIGHT SWEATS: ICD-10-CM

## 2018-12-20 DIAGNOSIS — R76.8 RHEUMATOID FACTOR POSITIVE: ICD-10-CM

## 2018-12-20 DIAGNOSIS — M25.50 POLYARTHRALGIA: Primary | ICD-10-CM

## 2018-12-20 DIAGNOSIS — M25.50 POLYARTHRALGIA: ICD-10-CM

## 2018-12-20 DIAGNOSIS — D36.10 SCHWANNOMA: Primary | ICD-10-CM

## 2018-12-20 DIAGNOSIS — R26.9 GAIT DISTURBANCE: ICD-10-CM

## 2018-12-20 PROCEDURE — 3077F SYST BP >= 140 MM HG: CPT | Mod: CPTII,S$GLB,, | Performed by: INTERNAL MEDICINE

## 2018-12-20 PROCEDURE — 72100 X-RAY EXAM L-S SPINE 2/3 VWS: CPT | Mod: 26,,, | Performed by: RADIOLOGY

## 2018-12-20 PROCEDURE — 99999 PR PBB SHADOW E&M-EST. PATIENT-LVL III: CPT | Mod: PBBFAC,,, | Performed by: INTERNAL MEDICINE

## 2018-12-20 PROCEDURE — 71046 X-RAY EXAM CHEST 2 VIEWS: CPT | Mod: TC

## 2018-12-20 PROCEDURE — 72070 X-RAY EXAM THORAC SPINE 2VWS: CPT | Mod: 26,,, | Performed by: RADIOLOGY

## 2018-12-20 PROCEDURE — 72100 X-RAY EXAM L-S SPINE 2/3 VWS: CPT | Mod: TC

## 2018-12-20 PROCEDURE — 72070 X-RAY EXAM THORAC SPINE 2VWS: CPT | Mod: TC

## 2018-12-20 PROCEDURE — 3288F FALL RISK ASSESSMENT DOCD: CPT | Mod: CPTII,S$GLB,, | Performed by: INTERNAL MEDICINE

## 2018-12-20 PROCEDURE — 3078F DIAST BP <80 MM HG: CPT | Mod: CPTII,S$GLB,, | Performed by: INTERNAL MEDICINE

## 2018-12-20 PROCEDURE — 1100F PTFALLS ASSESS-DOCD GE2>/YR: CPT | Mod: CPTII,S$GLB,, | Performed by: INTERNAL MEDICINE

## 2018-12-20 PROCEDURE — 71046 X-RAY EXAM CHEST 2 VIEWS: CPT | Mod: 26,,, | Performed by: RADIOLOGY

## 2018-12-20 PROCEDURE — 99215 OFFICE O/P EST HI 40 MIN: CPT | Mod: S$GLB,,, | Performed by: INTERNAL MEDICINE

## 2018-12-20 RX ORDER — PREDNISONE 10 MG/1
40 TABLET ORAL DAILY
Qty: 20 TABLET | Refills: 0 | Status: SHIPPED | OUTPATIENT
Start: 2018-12-20 | End: 2018-12-25

## 2018-12-20 ASSESSMENT — ROUTINE ASSESSMENT OF PATIENT INDEX DATA (RAPID3)
MDHAQ FUNCTION SCORE: 2.4
FATIGUE SCORE: 5
PATIENT GLOBAL ASSESSMENT SCORE: 10
AM STIFFNESS SCORE: 1, YES
PSYCHOLOGICAL DISTRESS SCORE: 6.6
WHEN YOU AWAKENED IN THE MORNING OVER THE LAST WEEK, PLEASE INDICATE THE AMOUNT OF TIME IT TAKES UNTIL YOU ARE AS LIMBER AS YOU WILL BE FOR THE DAY: 30 MINUTES
PAIN SCORE: 9
TOTAL RAPID3 SCORE: 9

## 2018-12-20 NOTE — TELEPHONE ENCOUNTER
Labs with some blood in urine (history of this when he had prostate stones), mild increase CPK, improvement in creatinine.  X-ray of Chest with possible atelectasis.  X-ray of thoracic and lumbar spine with degenerative changes.  Will order MRI of thoracic and lumbar spine due to history of schwannoma and pain that is slightly in lumbar region.

## 2018-12-20 NOTE — PROGRESS NOTES
Subjective:       Patient ID: Rojelio Dye Sr. is a 73 y.o. male.    Chief Complaint: Joint pain    HPI:  Rojelio Dye Sr. is a 73 y.o. 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       Last visit 9/2013 for +RF but normal joint scan and inflammatory markers.  Consulted Dr. Olmstead who did surgeries on both feet.  Left foot now with infection.  Right foot has worsened after surgery.   Trouble with wound healing.  In past had to have PICC line for antibiotics.     Saw Dr. Olmstead couple weeks ago and he wants to pin another toe.  Now with infection in toe but antibiotics did not help.       Dr. [facility] on [facility] injects knees with (last 11/30/2018) helped left knee but not right knee.      Interval History:    Ten days of diffuse joint pains.  Began mild aches and difficulty sitting.  Started to have jerkiness different from restless legs.  Joints have not swollen.  Muscles are weak.  Can not get out of chair and can not get up once seated.  Limbs jerk.  Trouble eating.  Hands get stuck together.  Ten pound weight loss 3 weeks.    Ache and tingling in hands and all over pain 9/10.  Laying flat helps some but nothing improves pain.  Pain medicine helps some.  Being awake worsens pain.  Can't drive well.       Started prednisone 40 mg daily 3 days ago without improvement.   Has tingling in hands.    Twitching in left foot and left leg.    Medial branch block 12/5/18 and 11/17/18 which helped neck short term.   Yesterday (12/19/18) had cervical rhizotomy.      Now with lower back pain that began with diffuse body aches.   Sinus congestion with brown and yellow congestion.  No fever but afternoon/evening sweats.  Feels cold  10 pound unintentional weight loss.  Prior of this felt well and was hunting and fishing.  Felt well to travel to [place].     Review of Systems   Constitutional: Positive for fatigue and unexpected weight change (weight loss).  "  HENT: Negative.    Respiratory: Positive for shortness of breath.    Endocrine: Negative.    Genitourinary: Negative.    Musculoskeletal: Positive for arthralgias and myalgias.   Allergic/Immunologic: Negative.    Neurological:        Burning tingling in arms and legs  Jerky movement in legs   Hematological: Bruises/bleeds easily.   Psychiatric/Behavioral: Negative.          Objective:   BP (!) 165/74   Pulse (!) 55   Ht 5' 7.5" (1.715 m)   Wt 70.3 kg (155 lb)   BMI 23.92 kg/m²      Physical Exam   Constitutional: He is oriented to person, place, and time and well-developed, well-nourished, and in no distress.   HENT:   Head: Normocephalic and atraumatic.   Eyes: Conjunctivae and EOM are normal.   Neck: Neck supple.   Cardiovascular: Normal rate and regular rhythm.    Pulmonary/Chest: Effort normal and breath sounds normal.   Abdominal: Soft. Bowel sounds are normal.   Neurological: He is alert and oriented to person, place, and time.   Slow stiff gait.  Jerkiness of legs involuntarily   Skin: Skin is warm and dry.     Psychiatric: Affect normal.   Musculoskeletal:   28 joint count: 0 swollen and 2 painful (knees)            Comprehensive Initial Assessment  Pain level: see AQ  Functional status: see AQ  Patient has:     Previous history malignancy: No   Weight loss: weight loss   Previous longstanding steroid use or immunosuppression: NO    Recent significant infection: No   Fracture or suspected fracture: No   Bowel or bladder incontinence: No    Prior treatment and response:   Employment status:   Are radicular symptoms present?     Physical examination  Straight leg raise test b/l : negative  Ankle and knee reflexes: negative  Quadriceps; ankle and great toe dorsiflexion and plantar flexion strength: Normal  Pulses in lower extremities: Normal  Sensory exam: Normal    Treatment Plan  Advised against bed rest: Yes  Advised normal activity as tolerated: Yes    Labs  Dr. Grajeda's labs 12/18/18  ESR= 33 " (nl 0-15) however labs sat for long time therefore not valid  CRP=0.2  (nl<0.5)  LOUISE negative  Rheumatoid factor=12 (nl<14)  EW=146 ([id number])    Assessment:       1. Arthralgias. OA vs. RA vs PsA vs Polyarticular Gout; positive RF but in past but now negative normal ESR and CRP previously; erosions on X-ray right 1st MTP and hands; Now with diffuse pain, unintentional weight loss, gait disturbance, sweats.  Colonscopy greater than 10 years ago or so and stool negative couple months ago.    2. History of osteoarthrosis in multiple joints. Erosive OA on x-ray  3. Chondrocalinosis  4. Positive rheumatoid factor in past.  Now negative  5. Diabetes.  Not on any medications.  Tragenta caused glucose to drop  6. Schwanoma.  In past located outside of the lungs  7. Hyperuricemia  8.  Psoriasis diagnosed last year  9.  Stage 3 renal disease  10. Paresthesias and jerkiness.  Unsure if related to Schwanomma.  Concern for ADIP vs CDIP.  Need neurology assistance    Plan:       1.  CXR and x-ray lumbar and thoracic  2.  Labs including uric acid  3.  Refer to neurology  4. Continue colchicine and consider adding allopurinol 100 mg daily.  Reviewed medications for interactions.    5.  If no improvement with lowering uric acid consider DMARD therapy for possible PsA. Concerned about risk of immunosuppression with history of foot ulcers.      RTO 4 months/prn

## 2018-12-21 ENCOUNTER — PATIENT MESSAGE (OUTPATIENT)
Dept: RHEUMATOLOGY | Facility: CLINIC | Age: 73
End: 2018-12-21

## 2018-12-21 ENCOUNTER — OFFICE VISIT (OUTPATIENT)
Dept: NEUROLOGY | Facility: CLINIC | Age: 73
End: 2018-12-21
Payer: MEDICARE

## 2018-12-21 VITALS
DIASTOLIC BLOOD PRESSURE: 84 MMHG | WEIGHT: 155 LBS | BODY MASS INDEX: 23.49 KG/M2 | HEART RATE: 61 BPM | HEIGHT: 68 IN | SYSTOLIC BLOOD PRESSURE: 177 MMHG | RESPIRATION RATE: 16 BRPM

## 2018-12-21 DIAGNOSIS — R53.1 WEAKNESS: ICD-10-CM

## 2018-12-21 DIAGNOSIS — M48.02 STENOSIS OF CERVICAL SPINE WITH MYELOPATHY: Primary | ICD-10-CM

## 2018-12-21 DIAGNOSIS — G99.2 STENOSIS OF CERVICAL SPINE WITH MYELOPATHY: Primary | ICD-10-CM

## 2018-12-21 DIAGNOSIS — R29.2 HYPERREFLEXIA: ICD-10-CM

## 2018-12-21 DIAGNOSIS — R26.1 SPASTIC GAIT: ICD-10-CM

## 2018-12-21 DIAGNOSIS — G25.3 LOWER EXTREMITY MYOCLONUS: ICD-10-CM

## 2018-12-21 PROBLEM — G95.20 CERVICAL CORD COMPRESSION WITH MYELOPATHY: Status: ACTIVE | Noted: 2018-12-21

## 2018-12-21 PROCEDURE — 99205 OFFICE O/P NEW HI 60 MIN: CPT | Mod: S$GLB,,, | Performed by: PSYCHIATRY & NEUROLOGY

## 2018-12-21 PROCEDURE — 3079F DIAST BP 80-89 MM HG: CPT | Mod: CPTII,S$GLB,, | Performed by: PSYCHIATRY & NEUROLOGY

## 2018-12-21 PROCEDURE — 3288F FALL RISK ASSESSMENT DOCD: CPT | Mod: CPTII,S$GLB,, | Performed by: PSYCHIATRY & NEUROLOGY

## 2018-12-21 PROCEDURE — 1100F PTFALLS ASSESS-DOCD GE2>/YR: CPT | Mod: CPTII,S$GLB,, | Performed by: PSYCHIATRY & NEUROLOGY

## 2018-12-21 PROCEDURE — 99999 PR PBB SHADOW E&M-EST. PATIENT-LVL III: CPT | Mod: PBBFAC,,, | Performed by: PSYCHIATRY & NEUROLOGY

## 2018-12-21 PROCEDURE — 3077F SYST BP >= 140 MM HG: CPT | Mod: CPTII,S$GLB,, | Performed by: PSYCHIATRY & NEUROLOGY

## 2018-12-21 RX ORDER — DULOXETIN HYDROCHLORIDE 30 MG/1
30 CAPSULE, DELAYED RELEASE ORAL DAILY
Refills: 0 | COMMUNITY
Start: 2018-12-17 | End: 2021-06-23

## 2018-12-21 RX ORDER — AMOXICILLIN 500 MG/1
CAPSULE ORAL
Refills: 0 | Status: ON HOLD | COMMUNITY
Start: 2018-12-17 | End: 2019-01-08 | Stop reason: HOSPADM

## 2018-12-21 RX ORDER — ROPINIROLE 2 MG/1
TABLET, FILM COATED ORAL
Refills: 1 | Status: ON HOLD | COMMUNITY
Start: 2018-12-17 | End: 2019-01-08 | Stop reason: HOSPADM

## 2018-12-21 NOTE — LETTER
December 21, 2018      Dolly Herbert MD  [address] [facility] [address]           [facility]  [address] [facility] [address] [place] [address]  Phone: [phone]  Fax: [phone]          Patient: Rojelio Dye Sr.   MR Number: [id number]   YOB: 1945   Date of Visit: 12/21/2018       Dear Dr. Dolly Herbert:    Thank you for referring Rojelio Dye to me for evaluation. Attached you will find relevant portions of my assessment and plan of care.    If you have questions, please do not hesitate to call me. I look forward to following Rojelio Dye along with you.    Sincerely,    Ellyn Evans, DO    Enclosure  CC:  No Recipients    If you would like to receive this communication electronically, please contact [email or web address] or [phone] to request more information on [facility] access.    For providers and/or their staff who would like to refer a patient to [facility], please contact us through our one-stop-shop provider referral line, [facility], at [phone].    If you feel you have received this communication in error or would no longer like to receive these types of communications, please e-mail [email or web address]

## 2018-12-21 NOTE — PROGRESS NOTES
"NEUROLOGY  Outpatient CONSULT    [facility]  [address], [place] [address]  [phone] (office) / [phone] (fax)    Patient Name:  Rojelio Dye Sr.  :  1945  MR #:  [id number]  Acct #:  [id number]    Date of Neurology Consult: 2018  Name of Neurologist: Ellyn Evans D.O, ABPN, AOBNP, ABEM    Other Physicians:  Ally Grajeda MD (Primary Care Physician); Dolly Herbert* (Referring)      Chief Complaint: Extremity Weakness and Numbness      History of Present Illness (HPI):  Rojelio Dye Sr. is a 73 y.o. male referred by his rheumatologist for evaluation of paresthesias, gait disturbance and jerking.    Patient states 3 weeks ago he was "normal".  Then about 10-14 days ago he felt "weird".  He can't explain it, he just felt something was different.   He has chronic arthritis in his hands, knees and feet.  He has had multiple surgeries on his feet.  He also gets knee injections.  When this started he was having total body pain and difficulty sitting for a long period of time.  He became more rigid and having trouble with balance.  He was losing his dexterity.  He started having trouble cutting foods.  His cognition seemed ok.  He is also getting jerks, where his knee will jerk up.  He describes the pain as aching in the muscles, pain in the joints, and limitation of range of motion at the joints.  He has persistent tingling in the hands.  He has not had this before, but had arm numbness that led to a cervical rhizotomy on Wednesday.  He will also get burning pain across his shoulders.  He has pain and tenderness to palpation of his limbs.  It hurts to put on deodorant.  He had to stop walking because he feels his balance is poor.  He has to keep his legs locked because if he doesn't they will jerk or go out.  If he lays in bed he will feel better.  He will jerk in his arms and right leg more than left.   He can also get cramps in his legs and " toes.  He feels generally weak.  He has lost 10 pounds in the last few weeks.       He has renal disease. He has discussed the weight loss with his doctors.  He had blood work drawn.  He was started on prednisone to see if this helped.  He does not feel it has helped.    He has fallen in the last couple weeks, but did not hit his head.     He is having urinary urgency that got bad yesterday.  He has had constipation but related to his pain medicine.    He reported to nursing he is a little short of breath.    Treatment to date:  N/A    Review of Systems:   General: Weight gain: No, Weight Loss: No, Fatigue: No,   Fever: No, Chills: No, Night Sweats: No, Insomnia: No, Excessive sleeping: No   Respiratory:  Cough: No, Shortness of Breath: No,   Wheezing: No, Excessive Snoring: Yes, Coughing up blood: No  Endocrine: Heat Intolerance: No, Cold Intolerance: No,   Excessive Thirst: No, Excessive Hunger: No,   Eyes:  Blurred Vision: No, Double Vision: No,   Light Sensitivity: No, Eye pain: No  Musculoskeletal: Muscle Aches/Pain: Yes, Joint Pain/Swelling: No, Muscle Cramps: Yes, Muscle Weakness: No, Neck Pain: Yes, Back Pain: No   Neurological: Difficulty Walking/Falls: No, Headache Migraine: No, Dizziness/Vertigo: No, Fainting: No, Difficulty with Speech: No, Weakness: No, Tingling/Numbness: Yes, Tremors: Yes, Memory Problems: No, Seizures: No, Difficulty Swallowing: No, Altered Taste: No.  Cardiovascular: Chest Pain: No, Shortness of Breath: No,   Palpitations: No,  Gastrointestinal: Nausea/Vomiting: No, Constipation: No, Diarrhea: No, Bloody Stools: No   Psych/Cog:  Depression: No, Anxiety: No, Hallucinations: No, Problems Concentrating: No  : Frequent Urination: No, Incontinence: No, Blood of Urine: No, Urinary Infections: No, Changes in Sex Drive: No   ENT:Hearing Loss: No, Earache: No, Ringing in Ears: No,   Facial Pain: No, Chronic Congestion: No   Immune: Seasonal Allergies: No, Hives and/or Rashes: No  The  remainder of the review of twelve body systems was reviewed and normal.    Past Medical, Surgical, Family & Social History:   Past Medical History:   Diagnosis Date    Acid reflux     Acquired hammer toe of right foot 8/29/2014    Arthritis of foot 12/9/2015    Benign essential hypertension 4/23/2014    BPH with urinary obstruction 12/10/2015    CKD (chronic kidney disease), stage II 4/23/2014    Followed by Dr. Rodriguez Bradnt    Encounter for blood transfusion     Hypercholesterolemia without hypertriglyceridemia 7/17/2004    Mild intermittent asthma without complication 4/23/2014    Narcolepsy     ADRIANNA on CPAP 4/23/2014    Osteoarthrosis involving, or with mention of more than one site, but not specified as generalized, multiple sites 12/28/2011    Pericarditis     Pes planovalgus 5/5/2014    Pre-diabetes 4/23/2014     Past Surgical History:   Procedure Laterality Date    ADENOIDECTOMY      ARTHRODESIS-FOOT Triple arhrodesis hindfoot Right 12/9/2015    [facility]    COCHLEAR IMPLANT REVISION      FOOT SURGERY  4-22-14    left    FUSION, FOOT Left 4/22/2014    [facility]    HERNIA REPAIR      IRRIGATION AND DEBRIDEMENT LOWER EXTREMITY-RIGHT FOOT-6L NS WITH CYSTO TUBING Right 4/5/2016    [facility]    PROSTATE SURGERY      REPAIR-HAMMER TOE Right 9/2/2014    [facility]    TENDONOTOMY-PERCUTANEOUS Left 9/2/2014    [facility]    TENOTOMY, FLEXOR Right 4/22/2014    [facility]    TONSILLECTOMY      VASECTOMY       Family History   Problem Relation Age of Onset    Osteoporosis Mother     Heart disease Father     Rheum arthritis Neg Hx     Lupus Neg Hx      Alcohol use:  reports that he drinks alcohol.   (Of note, 0.6 oz = 1 beer or 6  oz = 10 beers).  Tobacco use:  reports that  has never smoked. he has never used smokeless tobacco.  Street drug use:  reports that he does not use drugs.  Allergies: Sulfamethoxazole-trimethoprim and Mobic [meloxicam].    Home Medications:     Current Outpatient Medications:     acetaminophen (TYLENOL) 650 MG TbSR, Take 650 mg by mouth every evening. , Disp: , Rfl:     allopurinol (ZYLOPRIM) 100 MG tablet, TAKE 1 TABLET (100 MG TOTAL) BY MOUTH ONCE DAILY., Disp: 30 tablet, Rfl: 1    amoxicillin (AMOXIL) 500 MG capsule, , Disp: , Rfl: 0    arginine 500 mg tablet, Take 500 mg by mouth once daily., Disp: , Rfl:     azelastine (ASTEPRO) 0.15 % (205.5 mcg) Spry, daily as needed. 2 Spray, Non-Aerosol Nasal Every evening, Disp: , Rfl:     budesonide-formoterol 160-4.5 mcg (SYMBICORT) 160-4.5 mcg/actuation HFAA, Inhale into the lungs. 2 HFA Aerosol Inhaler Inhalation Twice a day --using PRN, Disp: , Rfl:     carvedilol (COREG) 12.5 MG tablet, Take 12.5 mg by mouth 2 (two) times daily., Disp: , Rfl: 2    citalopram (CELEXA) 10 MG tablet, Take 10 mg by mouth once daily. , Disp: , Rfl:     COLCRYS 0.6 mg tablet, TAKE 1 TABLET BY MOUTH TWICE DAILY, AS DIRECTED, Disp: 60 tablet, Rfl: 1    diclofenac sodium (VOLTAREN) 1 % Gel, Apply 2 g topically once daily., Disp: , Rfl:     DULoxetine (CYMBALTA) 30 MG capsule, , Disp: , Rfl: 0    fluticasone (FLONASE) 50 mcg/actuation nasal spray, , Disp: , Rfl:     glucosamine-chondroitin 500-400 mg tablet, Take 1 tablet by mouth 2 (two) times daily. , Disp: , Rfl:     HYDROcodone-acetaminophen (NORCO)  mg per tablet, Take 1 tablet by mouth every 12 (twelve) hours as needed., Disp: 60 tablet, Rfl: 0    levocetirizine (XYZAL) 5 MG tablet, Take 1 tablet (5 mg total) by mouth every evening., Disp: 90 tablet, Rfl: 3    lidocaine-prilocaine (EMLA) cream, , Disp: , Rfl:     montelukast (SINGULAIR) 10 mg tablet, , Disp: , Rfl:     multivitamin with minerals tablet, once  "daily. , Disp: , Rfl:     naproxen (NAPROSYN) 500 MG tablet, TAKE 1 TABLET BY MOUTH TWICE DAILY (Patient taking differently: TAKE 1 TABLET BY MOUTH DAILY), Disp: 180 tablet, Rfl: 0    oxybutynin (DITROPAN) 5 MG Tab, Take 5 mg by mouth 2 (two) times daily. OVER ACTIVE BLADDER, Disp: , Rfl: 2    predniSONE (DELTASONE) 10 MG tablet, Take 4 tablets (40 mg total) by mouth once daily. for 5 days, Disp: 20 tablet, Rfl: 0    ranitidine (ZANTAC) 300 MG tablet, 150 mg 2 (two) times daily. 0.5 tablet BID FOR REFLUX, Disp: , Rfl:     rOPINIRole (REQUIP) 1 MG tablet, , Disp: , Rfl:     rOPINIRole (REQUIP) 2 MG tablet, , Disp: , Rfl: 1    simvastatin (ZOCOR) 40 MG tablet, TAKE 1 TABLET BY MOUTH AT BEDTIME FOR CHOLESTEROL, Disp: 90 tablet, Rfl: 1    traMADol (ULTRAM) 50 mg tablet, , Disp: , Rfl: 1    VIAGRA 100 mg tablet, as needed. , Disp: , Rfl: 6    Physical Examination:  BP (!) 177/84 (BP Location: Left arm, Patient Position: Sitting, BP Method: Large (Automatic))   Pulse 61   Resp 16   Ht 5' 7.5" (1.715 m)   Wt 70.3 kg (155 lb)   BMI 23.92 kg/m²     GENERAL:  General appearance: Well, non-toxic appearing.  No apparent distress.  Fundi exam: normal.  Neck: supple.  Carotid auscultation: normal.  Heart auscultation: normal.  Peripheral pulses: normal.  Extremities: normal.    MENTAL STATUS:  Alertness, attention span & concentration: normal.  Language: normal.  Orientation to self, place & time:  normal.  Memory, recent & remote: normal.  Fund of knowledge: normal.  MMSE:    SPEECH:  Clear and fluent.  Follows complex commands.    CRANIAL NERVES:  Cranial Nerves II-XII were examined.  II - Visual fields: normal.  III, IV, VI: PERRL, EOMI, No ptosis, No nystagmus.  V - Facial sensation: normal.  VII - Face symmetry & mobility: normal.  VIII - Hearing: normal.  IX, X - Palate: mobile & midline.  XI - Shoulder shrug: normal.  XII - Tongue protrusion: normal.    GROSS MOTOR:  Gait & station: spastic and slightly " "ataxic gait.  Tone: increased tone in both lower limbs.  Abnormal movements: hip flexion myoclonus.  Finger-nose & Heel-knee-shin: normal.  Rapid alternating movements & drift: normal.  Romberg: unsteady with eyes open    MUSCLE STRENGTH:     Fascics Atrophy RIGHT    LEFT Atrophy Fascics     5 Neck Ext. 5       5 Neck Flex 5       5 Deltoids 5       5 Sh.Ext.Rot. 5       5 Sh.Int.Rot. 5       5 Biceps 5       5 Triceps 5       5 Forearm.Pr. 5       5 Wrist Ext. 5       5 Wrist Flex 5       5 Finger Ext. 5       4 Finger Flex 4       4 FPL 4       4 Inteross. 4                         5 Iliopsoas 5       5 Hip Abduct 5       5 Hip Adduct 5       5 Quads 5       5 Hams 5       5 Dorsiflex 5       5 Plantar Flex 5       5 Ankle Reese 5       5 Ankle Invert 5       5 Toe Ext. 5       5 Toe Flex 5                         REFLEXES:    RIGHT Reflex   LEFT   3 Biceps 3   3 Brachiorad. 3   3 Triceps 3   - Pectoralis -    Jaw Jerk    - Barahona's +        3 Patellar 3   3 Ankle 3   + Suprapatellar +             Down PLANTAR Down         SENSORY:  Light touch: Normal throughout.  Sharp touch: decreased in right leg.  Vibration: loss of vibration in left hand.  Temperature: Normal throughout.    Diagnostic Data Reviewed:   Rheumatology referral records were reviewed.  He was complaining of limb jerks, weakness and his hands "getting stuck together".      EXAMINATION:  MRI CERVICAL SPINE WITHOUT CONTRAST    CLINICAL HISTORY:  Cervicalgia    TECHNIQUE:  Multi slice, multisequence images of the cervical spine were obtained in multiple projections without the use of intravenous contrast.    COMPARISON:  None.    FINDINGS:  There is grade 1 anterolisthesis of C3 on C4 and C4 on C5.  There is grade 1 retrolisthesis of C5 on C6.  There is grade 1 anterolisthesis of C7 on T1.  There is diffuse disc desiccation.  There is no abnormal marrow edema.  The included portions of the posterior fossa are normal.  The cerebellar tonsils are " normally positioned.  The craniocervical junction is symmetric.  The atlantoaxial articulation is normal.    C2-C3: The central canal and bilateral neural foramina are widely patent.    C3-C4: There is a moderately sized broad-based disc bulge.  There is severe ligamentum flavum hypertrophy.  There are large uncovertebral osteophytes bilaterally.  This resulting in severe stenosis of the central canal and bilateral neural foramina.    C4-C5: There is a small circumferential broad-based disc bulge.  There are prominent uncovertebral osteophytes bilaterally.  These result in mild narrowing of the central canal and bilateral neural foramina.    C5-C6: There is a small circumferential broad-based disc bulge resulting in mild narrowing of the central canal.  There are prominent bilateral uncovertebral osteophytes resulting in moderate narrowing of the bilateral neural foramina.    C6-C7: There is a moderately sized broad-based disc bulge resulting in moderate narrowing of the central canal.  There are large uncovertebral osteophytes resulting in severe narrowing of the bilateral neural foramina.    C7-T1: There is a small circumferential broad-based disc bulge with ligamentum flavum hypertrophy resulting in moderate narrowing of the central canal.  There is severe narrowing of the bilateral neural foramen.    The paraspinal musculature is normal.  There is no cervical adenopathy.      Impression       Severe multilevel spondylolisthesis and spondylosis resulting in multifocal areas of canal and foraminal narrowing as detailed above.  This is most prominent at C3-C4 with there is severe central canal and bilateral foraminal narrowing.     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    Findings:    Visualized cervical spine demonstrates retrolisthesis of C6 on C7 and anterolisthesis of C7 on T1.  Appearance is similar compared previous MR examination.    The thoracic spine demonstrates proper alignment. The vertebral bodies show normal signal intensity and height with no indication of acute fracture or pathologic marrow replacement process. The intervertebral disk spaces also appear well-maintained. Mild, degenerative change throughout the thoracic spine consisting of scattered degenerative endplate changes and mild intravertebral disc space narrowing, unchanged in the interim.  Broad-based disc bulge at T11-12 with some effacement of the anterior CSF sleeve and no spinal canal stenosis or neural foraminal narrowing.    Redemonstration of a stable, 2.0 x 1.4 cm paraspinal lesion adjacent to the right aspect of the T8 vertebral body anterior to the exiting nerve roots.  Remaining paraspinal tissues are unremarkable. No new lesion identified.    The demonstrated portion of the spinal cord is normal in signal intensity at all levels with no indication of myelomalacia or cord edema. No intradural signal abnormalities are apparent. Evaluation of the surrounding soft tissue structures reveals bilateral renal cysts similar in appearance when compared to MR examination of 11/09/2016.      Impression         Stable, 2.0 cm, paraspinal lesion adjacent to the right aspect of the T8 vertebral body.  No new lesion identified.    Stable, mild, degenerative changes of the thoracic spine.    Bilateral renal cysts.     Component      Latest Ref Rng & Units 12/20/2018   WBC      3.90 - 12.70 K/uL 9.68   RBC      4.60 - 6.20 M/uL 3.99 (L)   Hemoglobin      14.0 - 18.0 g/dL 12.3 (L)   Hematocrit      40.0 - 54.0 % 35.9 (L)   MCV      82 - 98 fL 90   MCH      27.0 - 31.0 pg 30.8   MCHC      32.0 - 36.0 g/dL 34.3   RDW      11.5 - 14.5 % 12.7    Platelets      150 - 350 K/uL 144 (L)   MPV      9.2 - 12.9 fL 12.6   Immature Granulocytes      0.0 - 0.5 % 0.3   Gran # (ANC)      1.8 - 7.7 K/uL 8.8 (H)   Immature Grans (Abs)      0.00 - 0.04 K/uL 0.03   Lymph #      1.0 - 4.8 K/uL 0.6 (L)   Mono #      0.3 - 1.0 K/uL 0.3   Eos #      0.0 - 0.5 K/uL 0.0   Baso #      0.00 - 0.20 K/uL 0.01   nRBC      0 /100 WBC 0   Gran%      38.0 - 73.0 % 90.9 (H)   Lymph%      18.0 - 48.0 % 5.7 (L)   Mono%      4.0 - 15.0 % 3.0 (L)   Eosinophil%      0.0 - 8.0 % 0.0   Basophil%      0.0 - 1.9 % 0.1   Differential Method       Automated   Sodium      136 - 145 mmol/L 140   Potassium      3.5 - 5.1 mmol/L 4.9   Chloride      95 - 110 mmol/L 108   CO2      23 - 29 mmol/L 22 (L)   Glucose      70 - 110 mg/dL 138 (H)   BUN, Bld      8 - 23 mg/dL 69 (H)   Creatinine      0.5 - 1.4 mg/dL 1.9 (H)   Calcium      8.7 - 10.5 mg/dL 9.7   Total Protein      6.0 - 8.4 g/dL 7.2   Albumin      3.5 - 5.2 g/dL 4.0   Total Bilirubin      0.1 - 1.0 mg/dL 0.4   Alkaline Phosphatase      55 - 135 U/L 85   AST      10 - 40 U/L 22   ALT      10 - 44 U/L 24   Anion Gap      8 - 16 mmol/L 10   eGFR if African American      >60 mL/min/1.73 m:2 40.1 (A)   eGFR if non African American      >60 mL/min/1.73 m:2 34.7 (A)   Protein, Serum      6.0 - 8.4 g/dL 7.0   Albumin grams/dl      3.35 - 5.55 g/dL 4.23   Alpha-1 grams/dl      0.17 - 0.41 g/dL 0.34   Alpha-2 grams/dl      0.43 - 0.99 g/dL 0.88   Beta grams/dl      0.50 - 1.10 g/dL 0.67   Gamma grams/dl      0.67 - 1.58 g/dL 0.88   Aldolase      1.2 - 7.6 U/L 4.0   CPK      20 - 200 U/L 227 (H)   CRP      0.0 - 8.2 mg/L 0.6   Sed Rate      0 - 23 mm/Hr 11   Uric Acid      3.4 - 7.0 mg/dL 7.0   Immunofix Interp.       SEE COMMENT       Assessment and Plan:  Rojelio Dye Sr. is a 73 y.o., right handed man with osteoarthritis presenting with weakness, hyper-reflexia, spastic gait and hip flexor myoclonus who has cervical stenosis on MRI C spine from  11/15.  Due to the rapid decline in his physical exam, he is being sent to the ER to be seen by neurosurgery emergently for cervical stenosis with myelopathy.  He will have further imaging and surgical consultation there.  Case discussed with Dr. Mao.        Important to note, also  has a past medical history of Acid reflux, Acquired hammer toe of right foot (8/29/2014), Arthritis of foot (12/9/2015), Benign essential hypertension (4/23/2014), BPH with urinary obstruction (12/10/2015), CKD (chronic kidney disease), stage II (4/23/2014), Encounter for blood transfusion, Hypercholesterolemia without hypertriglyceridemia (7/17/2004), Mild intermittent asthma without complication (4/23/2014), Narcolepsy, ADRIANNA on CPAP (4/23/2014), Osteoarthrosis involving, or with mention of more than one site, but not specified as generalized, multiple sites (12/28/2011), Pericarditis, Pes planovalgus (5/5/2014), and Pre-diabetes (4/23/2014).        Ellyn Evans D.O, ABPN, AOBNP, ABEM    This note was created with voice recognition software.  Grammatical, syntax and spelling errors may be inevitable.

## 2018-12-22 ENCOUNTER — PATIENT MESSAGE (OUTPATIENT)
Dept: RHEUMATOLOGY | Facility: CLINIC | Age: 73
End: 2018-12-22

## 2018-12-26 ENCOUNTER — TELEPHONE (OUTPATIENT)
Dept: NEUROSURGERY | Facility: CLINIC | Age: 73
End: 2018-12-26

## 2018-12-26 DIAGNOSIS — Z98.1 S/P CERVICAL SPINAL FUSION: Primary | ICD-10-CM

## 2018-12-26 NOTE — TELEPHONE ENCOUNTER
appts scheduled. Pt is currently IP.    ----- Message from Raya Pace NP sent at 12/26/2018  3:01 PM CST -----  He needs wound check scheduled POD#14. And 4 week f/u with Tiff with cervical x-ray ap/lat prior to appt. Thanks.

## 2019-01-05 ENCOUNTER — PATIENT MESSAGE (OUTPATIENT)
Dept: RHEUMATOLOGY | Facility: CLINIC | Age: 74
End: 2019-01-05

## 2019-01-14 ENCOUNTER — OFFICE VISIT (OUTPATIENT)
Dept: NEUROSURGERY | Facility: CLINIC | Age: 74
End: 2019-01-14
Payer: MEDICARE

## 2019-01-14 ENCOUNTER — HOSPITAL ENCOUNTER (OUTPATIENT)
Dept: RADIOLOGY | Facility: HOSPITAL | Age: 74
Discharge: HOME | End: 2019-01-14
Attending: NEUROLOGICAL SURGERY
Payer: MEDICARE

## 2019-01-14 VITALS
DIASTOLIC BLOOD PRESSURE: 68 MMHG | BODY MASS INDEX: 24.48 KG/M2 | WEIGHT: 156 LBS | TEMPERATURE: 99 F | SYSTOLIC BLOOD PRESSURE: 112 MMHG | HEIGHT: 67 IN

## 2019-01-14 DIAGNOSIS — Z98.1 S/P CERVICAL SPINAL FUSION: Primary | ICD-10-CM

## 2019-01-14 DIAGNOSIS — G95.20 CERVICAL CORD COMPRESSION WITH MYELOPATHY: ICD-10-CM

## 2019-01-14 DIAGNOSIS — Z98.1 S/P CERVICAL SPINAL FUSION: ICD-10-CM

## 2019-01-14 PROCEDURE — 72040 X-RAY EXAM NECK SPINE 2-3 VW: CPT | Mod: TC,FY,PO

## 2019-01-14 PROCEDURE — 99999 PR PBB SHADOW E&M-EST. PATIENT-LVL IV: CPT | Mod: PBBFAC,,, | Performed by: PHYSICIAN ASSISTANT

## 2019-01-14 PROCEDURE — 72040 XR CERVICAL SPINE AP LATERAL: ICD-10-PCS | Mod: 26,,, | Performed by: RADIOLOGY

## 2019-01-14 PROCEDURE — 99999 PR PBB SHADOW E&M-EST. PATIENT-LVL IV: ICD-10-PCS | Mod: PBBFAC,,, | Performed by: PHYSICIAN ASSISTANT

## 2019-01-14 PROCEDURE — 99024 PR POST-OP FOLLOW-UP VISIT: ICD-10-PCS | Mod: S$GLB,,, | Performed by: PHYSICIAN ASSISTANT

## 2019-01-14 PROCEDURE — 99024 POSTOP FOLLOW-UP VISIT: CPT | Mod: S$GLB,,, | Performed by: PHYSICIAN ASSISTANT

## 2019-01-14 PROCEDURE — 72040 X-RAY EXAM NECK SPINE 2-3 VW: CPT | Mod: 26,,, | Performed by: RADIOLOGY

## 2019-01-14 RX ORDER — GABAPENTIN 300 MG/1
300 CAPSULE ORAL SEE ADMIN INSTRUCTIONS
Qty: 90 CAPSULE | Refills: 2 | Status: SHIPPED | OUTPATIENT
Start: 2019-01-14 | End: 2019-05-09 | Stop reason: SDUPTHER

## 2019-01-14 NOTE — PROGRESS NOTES
Neurosurgery History & Physical    Patient ID: Rojelio Dye Sr. is a 73 y.o. male.    Chief Complaint   Patient presents with    Follow-up     Very severe pain on left side shoulder been since surgery.  Doesn't know what happened, bruise is there, feels like stabbing pain.  Numbeness and tingling in both hands. Gets shakes in both hands and arms. Pain is 3/10 today       Review of Systems   Constitutional: Negative for chills, diaphoresis, fatigue and fever.   HENT: Negative for congestion, hearing loss, rhinorrhea and sore throat.    Eyes: Negative for photophobia, pain, redness and visual disturbance.   Respiratory: Negative for cough, chest tightness, shortness of breath and wheezing.    Cardiovascular: Negative for chest pain, palpitations and leg swelling.   Gastrointestinal: Negative for abdominal distention, abdominal pain, constipation, diarrhea, nausea and vomiting.   Genitourinary: Negative for difficulty urinating, dysuria, frequency, hematuria and urgency.   Musculoskeletal: Positive for gait problem, myalgias and neck pain. Negative for back pain and neck stiffness.   Skin: Negative for pallor and rash.   Neurological: Positive for weakness and numbness. Negative for dizziness, seizures, speech difficulty, light-headedness and headaches.   Psychiatric/Behavioral: Negative for confusion, hallucinations and sleep disturbance.       Past Medical History:   Diagnosis Date    Acid reflux     Acquired hammer toe of right foot 8/29/2014    Arthritis of foot 12/9/2015    Benign essential hypertension 4/23/2014    BPH with urinary obstruction 12/10/2015    CKD (chronic kidney disease), stage II 4/23/2014    Followed by Dr. Rodriguez Brandt    Encounter for blood transfusion     Hypercholesterolemia without hypertriglyceridemia 7/17/2004    Mild intermittent asthma without complication 4/23/2014    Narcolepsy     ADRIANNA on CPAP 4/23/2014    Osteoarthrosis involving, or with mention of more than one  site, but not specified as generalized, multiple sites 12/28/2011    Pericarditis     Pes planovalgus 5/5/2014    Pre-diabetes 4/23/2014     Social History     Socioeconomic History    Marital status:      Spouse name: Not on file    Number of children: Not on file    Years of education: Not on file    Highest education level: Not on file   Social Needs    Financial resource strain: Not on file    Food insecurity - worry: Not on file    Food insecurity - inability: Not on file    Transportation needs - medical: Not on file    Transportation needs - non-medical: Not on file   Occupational History    Not on file   Tobacco Use    Smoking status: Never Smoker    Smokeless tobacco: Never Used    Tobacco comment: 2nd Hand -Father smoked 3 packs a day   Substance and Sexual Activity    Alcohol use: Yes     Comment: occ    Drug use: No    Sexual activity: Not Currently     Partners: Female     Birth control/protection: None   Other Topics Concern    Not on file   Social History Narrative    Not on file     Family History   Problem Relation Age of Onset    Osteoporosis Mother     Heart disease Father     Rheum arthritis Neg Hx     Lupus Neg Hx      Review of patient's allergies indicates:   Allergen Reactions    Sulfamethoxazole-trimethoprim Rash    Mobic [meloxicam] Hives       Current Outpatient Medications:     acetaminophen (TYLENOL) 650 MG TbSR, Take 650 mg by mouth every evening. , Disp: , Rfl:     allopurinol (ZYLOPRIM) 100 MG tablet, TAKE 1 TABLET (100 MG TOTAL) BY MOUTH ONCE DAILY., Disp: 30 tablet, Rfl: 1    arginine 500 mg tablet, Take 500 mg by mouth 2 (two) times daily. , Disp: , Rfl:     azelastine (ASTEPRO) 0.15 % (205.5 mcg) Spry, daily as needed. 2 Spray, Non-Aerosol Nasal Every evening, Disp: , Rfl:     carvedilol (COREG) 25 MG tablet, Take 1 tablet (25 mg total) by mouth 2 (two) times daily., Disp: 60 tablet, Rfl: 0    diclofenac sodium (VOLTAREN) 1 % Gel, Apply 2 g  "topically 2 (two) times daily. , Disp: , Rfl:     DULoxetine (CYMBALTA) 30 MG capsule, , Disp: , Rfl: 0    fluticasone (FLONASE) 50 mcg/actuation nasal spray, 2 sprays (100 mcg total) by Each Nare route once daily., Disp: 1 Bottle, Rfl: 0    fluticasone-vilanterol (BREO) 100-25 mcg/dose diskus inhaler, Inhale 1 puff into the lungs once daily. Controller, Disp: 1 each, Rfl: 0    gabapentin (NEURONTIN) 300 MG capsule, Take 1 capsule (300 mg total) by mouth As instructed., Disp: 90 capsule, Rfl: 2    hydrALAZINE (APRESOLINE) 50 MG tablet, Take 1 tablet (50 mg total) by mouth every 12 (twelve) hours., Disp: 60 tablet, Rfl: 0    hydroCHLOROthiazide (HYDRODIURIL) 12.5 MG Tab, Take 1 tablet (12.5 mg total) by mouth once daily., Disp: 30 tablet, Rfl: 0    montelukast (SINGULAIR) 10 mg tablet, , Disp: , Rfl:     multivitamin with minerals tablet, once daily. , Disp: , Rfl:     naproxen (NAPROSYN) 500 MG tablet, TAKE 1 TABLET BY MOUTH TWICE DAILY (Patient taking differently: TAKE 1 TABLET BY MOUTH DAILY), Disp: 180 tablet, Rfl: 0    oxyCODONE-acetaminophen (PERCOCET) 5-325 mg per tablet, Take 1 tablet by mouth every 8 (eight) hours as needed., Disp: 30 tablet, Rfl: 0    ranitidine (ZANTAC) 300 MG tablet, 150 mg 2 (two) times daily. 0.5 tablet BID FOR REFLUX, Disp: , Rfl:     rOPINIRole (REQUIP) 1 MG tablet, , Disp: , Rfl:     simvastatin (ZOCOR) 40 MG tablet, TAKE 1 TABLET BY MOUTH AT BEDTIME FOR CHOLESTEROL, Disp: 90 tablet, Rfl: 1    tamsulosin (FLOMAX) 0.4 mg Cap, Take 1 capsule (0.4 mg total) by mouth once daily., Disp: 30 capsule, Rfl: 0  Blood pressure 112/68, temperature 98.5 °F (36.9 °C), height 5' 7" (1.702 m), weight 70.8 kg (155 lb 15.6 oz).      Neurologic Exam     Mental Status   Oriented to person, place, and time.   Oriented to person.   Oriented to place.   Oriented to time.   Follows 3 step commands.   Attention: normal. Concentration: normal.   Speech: speech is normal   Level of consciousness: " alert  Knowledge: consistent with education.   Able to name object. Able to read. Able to repeat. Able to write. Normal comprehension.      Cranial Nerves      CN II   Visual acuity: normal  Right visual field deficit: none  Left visual field deficit: none      CN III, IV, VI   Pupils are equal, round, and reactive to light.  Right pupil: Size: 3 mm. Shape: regular. Reactivity: brisk. Consensual response: intact.   Left pupil: Size: 3 mm. Shape: regular. Reactivity: brisk. Consensual response: intact.   CN III: no CN III palsy  CN VI: no CN VI palsy  Nystagmus: none   Diplopia: none  Ophthalmoparesis: none  Conjugate gaze: present     CN V   Right facial sensation deficit: none  Left facial sensation deficit: none     CN VII   Right facial weakness: none  Left facial weakness: none     CN VIII   Hearing: intact     CN IX, X   CN IX normal.   CN X normal.      CN XI   Right sternocleidomastoid strength: normal  Left sternocleidomastoid strength: normal  Right trapezius strength: normal  Left trapezius strength: normal     CN XII   Fasciculations: absent  Tongue deviation: none     Motor Exam   Muscle bulk: normal  Overall muscle tone: normal  Right arm pronator drift: absent  Left arm pronator drift: absent     Strength   Right deltoid: 5/5  Left deltoid: 5/5  Right biceps: 5/5  Left biceps: 5/5  Right triceps: 5/5  Left triceps: 5/5  Right wrist flexion: 5/5  Left wrist flexion: 5/5  Right wrist extension: 5/5  Left wrist extension: 4/5  Right interossei: 4-/5  Left interossei: 4-/5  Right iliopsoas: 5/5  Left iliopsoas: 5/5  Right quadriceps: 5/5  Left quadriceps: 5/5  Right hamstrin/5  Left hamstrin/5  Right anterior tibial: unable to assess due to bilateral ankle fusion  Left anterior tibial: unable to assess due to bilateral ankle fusion  Right posterior tibial: unable to assess due to bilateral ankle fusion  Left posterior tibial: unable to assess due to bilateral ankle fusion  Right peroneal: unable to  assess due to bilateral ankle fusion  Left peroneal: unable to assess due to bilateral ankle fusion  Right gastroc: unable to assess due to bilateral ankle fusion  Left gastroc: unable to assess due to bilateral ankle fusion     Sensory Exam   Right arm light touch: decreased in hand  Left arm light touch: decreased in hand  Right leg light touch: normal  Left leg light touch: normal     Gait, Coordination, and Reflexes      Gait  Gait: myelopathic wide based gait; ambulates with a cane     Coordination   Finger to nose coordination: normal     Tremor   Resting tremor: absent  Intention tremor: absent  Action tremor: absent     Reflexes   Right brachioradialis: 3+  Left brachioradialis: 3+  Right biceps: 3+  Left biceps: 3+  Right triceps: 3+  Left triceps: 3+  Right patellar: 3+  Left patellar: 3+  Right achilles: unable to assess  Left achilles: unable to assess  Right Barahona: absent  Left Barahona: absent  Right ankle clonus: unable to assess  Left ankle clonus: unable to assess    Physical Exam  Constitutional: Oriented to person, place, and time. Appears well-developed and well-nourished.   HENT:   Head: Normocephalic and atraumatic.   Eyes: Pupils are equal, round, and reactive to light.   Neck: Normal range of motion. Neck supple.   Cardiovascular: Normal rate.    Pulmonary/Chest: Effort normal.   Musculoskeletal: Normal range of motion. Exhibits no edema.   Neurological: Alert and oriented to person, place, and time. Normal Finger-Nose-Finger Test, Gait-myelopathic and wide based.   Reflex Scores:       Tricep reflexes are 3+ on the right side and 3+ on the left side.       Bicep reflexes are 3+ on the right side and 3+ on the left side.       Brachioradialis reflexes are 3+ on the right side and 3+ on the left side.       Patellar reflexes are 3+ on the right side and 3+ on the left side.       Achilles reflexes-unable to assess  Skin: Skin is warm, dry and intact.   Psychiatric: Normal mood and affect.  Speech is normal and behavior is normal. Judgment and thought content normal.   Nursing note and vitals reviewed.      Provider dictation:    Mr. Rojelio Dye is a 73 year old male who presents for 4 weeks post-op appointment s/p urgent C3-C5 ACDF, C3-C4 laminectomy and C3-C5 PSIF for severe C3-C4, C4-C5 instability and spinal cord compression. He reports significant improvement in gait and weakness. He reports some improvement in hand numbness, but continues with some numbness throughout both hands. He was recently discharged from inpatient rehab on 1/9/2018 and has orders to begin outpatient PT/OT. He does complain of severe burning pain over the left trapezius muscle since surgery. Denies any radicular arm pain. Complains of mild neck pain, but much improved. He is now ambulating with a cane.     On exam patient has full strength except 4/5 B/L hand intrinsics and left wrist extension. Decreased sensation in bilateral hands to light touch. Myelopathic wide based gait. Incisions are well healed.    X-ray cervical spine personally reviewed and shows stable appropriate placement of instrumentation. No complication seen.     Mr. Dye is recovering well from surgery with improvement in myelopathy/weakness. He will be starting outpatient PT/OT soon. He is clear to remove his cervical collar in 2 weeks. I am sending a prescription of neurontin to patient's pharmacy for him to start. Mr. Dye will follow-up with Dr. Mao in 2 months for his 3 month post-op appointment with repeat x-rays. He was instructed on lifting and activity restrictions. He was informed to call the clinic with any further questions or concerns.     1. S/P cervical spinal fusion  X-Ray Cervical Spine AP And Lateral   2. Cervical cord compression with myelopathy

## 2019-01-17 DIAGNOSIS — E79.0 HYPERURICEMIA: ICD-10-CM

## 2019-01-17 DIAGNOSIS — M10.9 GOUT, ARTHRITIS: ICD-10-CM

## 2019-01-18 RX ORDER — ALLOPURINOL 100 MG/1
100 TABLET ORAL DAILY
Qty: 30 TABLET | Refills: 1 | Status: SHIPPED | OUTPATIENT
Start: 2019-01-18 | End: 2019-02-12 | Stop reason: SDUPTHER

## 2019-01-23 PROBLEM — R20.2 NUMBNESS AND TINGLING IN BOTH HANDS: Status: ACTIVE | Noted: 2019-01-23

## 2019-01-23 PROBLEM — R20.0 NUMBNESS AND TINGLING IN BOTH HANDS: Status: ACTIVE | Noted: 2019-01-23

## 2019-01-23 PROBLEM — F82 FINE MOTOR DELAY: Status: ACTIVE | Noted: 2019-01-23

## 2019-02-12 DIAGNOSIS — M10.9 GOUT, ARTHRITIS: ICD-10-CM

## 2019-02-12 DIAGNOSIS — E79.0 HYPERURICEMIA: ICD-10-CM

## 2019-02-12 RX ORDER — ALLOPURINOL 100 MG/1
100 TABLET ORAL DAILY
Qty: 30 TABLET | Refills: 1 | Status: SHIPPED | OUTPATIENT
Start: 2019-02-12 | End: 2019-04-28 | Stop reason: SDUPTHER

## 2019-03-12 ENCOUNTER — TELEPHONE (OUTPATIENT)
Dept: NEPHROLOGY | Facility: CLINIC | Age: 74
End: 2019-03-12

## 2019-03-12 NOTE — TELEPHONE ENCOUNTER
Left a message for the pt to call the office so that we can reschedule his appt at his convenience.

## 2019-03-26 ENCOUNTER — HOSPITAL ENCOUNTER (OUTPATIENT)
Dept: RADIOLOGY | Facility: HOSPITAL | Age: 74
Discharge: HOME | End: 2019-03-26
Attending: PHYSICIAN ASSISTANT
Payer: MEDICARE

## 2019-03-26 ENCOUNTER — OFFICE VISIT (OUTPATIENT)
Dept: NEUROSURGERY | Facility: CLINIC | Age: 74
End: 2019-03-26
Payer: MEDICARE

## 2019-03-26 VITALS
HEIGHT: 68 IN | DIASTOLIC BLOOD PRESSURE: 88 MMHG | SYSTOLIC BLOOD PRESSURE: 138 MMHG | WEIGHT: 155.19 LBS | BODY MASS INDEX: 23.52 KG/M2

## 2019-03-26 DIAGNOSIS — G95.20 CERVICAL CORD COMPRESSION WITH MYELOPATHY: Primary | ICD-10-CM

## 2019-03-26 DIAGNOSIS — Z98.1 S/P CERVICAL SPINAL FUSION: ICD-10-CM

## 2019-03-26 DIAGNOSIS — M48.02 SPINAL STENOSIS OF CERVICAL REGION: ICD-10-CM

## 2019-03-26 PROCEDURE — 72040 X-RAY EXAM NECK SPINE 2-3 VW: CPT | Mod: TC,FY,PO

## 2019-03-26 PROCEDURE — 99024 POSTOP FOLLOW-UP VISIT: CPT | Mod: S$GLB,,, | Performed by: NEUROLOGICAL SURGERY

## 2019-03-26 PROCEDURE — 72040 XR CERVICAL SPINE AP LATERAL: ICD-10-PCS | Mod: 26,,, | Performed by: RADIOLOGY

## 2019-03-26 PROCEDURE — 99999 PR PBB SHADOW E&M-EST. PATIENT-LVL III: CPT | Mod: PBBFAC,,, | Performed by: NEUROLOGICAL SURGERY

## 2019-03-26 PROCEDURE — 72040 X-RAY EXAM NECK SPINE 2-3 VW: CPT | Mod: 26,,, | Performed by: RADIOLOGY

## 2019-03-26 PROCEDURE — 99024 PR POST-OP FOLLOW-UP VISIT: ICD-10-PCS | Mod: S$GLB,,, | Performed by: NEUROLOGICAL SURGERY

## 2019-03-26 PROCEDURE — 99999 PR PBB SHADOW E&M-EST. PATIENT-LVL III: ICD-10-PCS | Mod: PBBFAC,,, | Performed by: NEUROLOGICAL SURGERY

## 2019-03-26 NOTE — PROGRESS NOTES
"Neurosurgery Outpatient Follow Up    Patient ID: Rojelio Dye Sr. is a 73 y.o. male.    Chief Complaint   Patient presents with    Post-op Evaluation     3 month f/u ACDF; pt states some swelling in front of neck that is tender to touch, still has "stinging" in different areas of the body, numbness and tingling to BUE; 60% use of right hand and 53% use of left hand         Review of Systems   Constitutional: Negative.    HENT: Negative.    Eyes: Negative.    Respiratory: Negative.    Cardiovascular: Negative.    Gastrointestinal: Negative.    Endocrine: Negative.    Genitourinary: Negative.    Musculoskeletal: Positive for neck pain. Negative for neck stiffness.   Skin: Negative.    Allergic/Immunologic: Negative.    Neurological: Positive for weakness.   Hematological: Negative.    Psychiatric/Behavioral: Negative.        Past Medical History:   Diagnosis Date    Acid reflux     Acquired hammer toe of right foot 8/29/2014    Arthritis of foot 12/9/2015    Benign essential hypertension 4/23/2014    BPH with urinary obstruction 12/10/2015    CKD (chronic kidney disease), stage II 4/23/2014    Followed by Dr. Rodriguez Brandt    Encounter for blood transfusion     Hypercholesterolemia without hypertriglyceridemia 7/17/2004    Mild intermittent asthma without complication 4/23/2014    Narcolepsy     ADRIANNA on CPAP 4/23/2014    Osteoarthrosis involving, or with mention of more than one site, but not specified as generalized, multiple sites 12/28/2011    Pericarditis     Pes planovalgus 5/5/2014    Pre-diabetes 4/23/2014     Social History     Socioeconomic History    Marital status:      Spouse name: Not on file    Number of children: Not on file    Years of education: Not on file    Highest education level: Not on file   Occupational History    Not on file   Social Needs    Financial resource strain: Not on file    Food insecurity:     Worry: Not on file     Inability: Not on file    " Transportation needs:     Medical: Not on file     Non-medical: Not on file   Tobacco Use    Smoking status: Never Smoker    Smokeless tobacco: Never Used    Tobacco comment: 2nd Hand -Father smoked 3 packs a day   Substance and Sexual Activity    Alcohol use: Yes     Comment: occ    Drug use: No    Sexual activity: Not Currently     Partners: Female     Birth control/protection: None   Lifestyle    Physical activity:     Days per week: Not on file     Minutes per session: Not on file    Stress: Not on file   Relationships    Social connections:     Talks on phone: Not on file     Gets together: Not on file     Attends Mu-ism service: Not on file     Active member of club or organization: Not on file     Attends meetings of clubs or organizations: Not on file     Relationship status: Not on file    Intimate partner violence:     Fear of current or ex partner: Not on file     Emotionally abused: Not on file     Physically abused: Not on file     Forced sexual activity: Not on file   Other Topics Concern    Not on file   Social History Narrative    Not on file     Family History   Problem Relation Age of Onset    Osteoporosis Mother     Heart disease Father     Rheum arthritis Neg Hx     Lupus Neg Hx      Review of patient's allergies indicates:   Allergen Reactions    Sulfamethoxazole-trimethoprim Rash    Mobic [meloxicam] Hives       Current Outpatient Medications:     acetaminophen (TYLENOL) 650 MG TbSR, Take 650 mg by mouth every evening. , Disp: , Rfl:     allopurinol (ZYLOPRIM) 100 MG tablet, Take 1 tablet (100 mg total) by mouth once daily., Disp: 30 tablet, Rfl: 1    arginine 500 mg tablet, Take 500 mg by mouth 2 (two) times daily. , Disp: , Rfl:     azelastine (ASTEPRO) 0.15 % (205.5 mcg) Spry, daily as needed. 2 Spray, Non-Aerosol Nasal Every evening, Disp: , Rfl:     carvedilol (COREG) 25 MG tablet, Take 1 tablet (25 mg total) by mouth 2 (two) times daily., Disp: 60 tablet, Rfl:  "0    diclofenac sodium (VOLTAREN) 1 % Gel, Apply 2 g topically 2 (two) times daily. , Disp: , Rfl:     DULoxetine (CYMBALTA) 30 MG capsule, , Disp: , Rfl: 0    fluticasone (FLONASE) 50 mcg/actuation nasal spray, 2 sprays (100 mcg total) by Each Nare route once daily., Disp: 1 Bottle, Rfl: 0    fluticasone-vilanterol (BREO) 100-25 mcg/dose diskus inhaler, Inhale 1 puff into the lungs once daily. Controller, Disp: 1 each, Rfl: 0    gabapentin (NEURONTIN) 300 MG capsule, Take 1 capsule (300 mg total) by mouth As instructed., Disp: 90 capsule, Rfl: 2    hydrALAZINE (APRESOLINE) 50 MG tablet, Take 1 tablet (50 mg total) by mouth every 12 (twelve) hours., Disp: 60 tablet, Rfl: 0    hydroCHLOROthiazide (HYDRODIURIL) 12.5 MG Tab, Take 1 tablet (12.5 mg total) by mouth once daily., Disp: 30 tablet, Rfl: 0    montelukast (SINGULAIR) 10 mg tablet, , Disp: , Rfl:     multivitamin with minerals tablet, once daily. , Disp: , Rfl:     naproxen (NAPROSYN) 500 MG tablet, TAKE 1 TABLET BY MOUTH TWICE DAILY (Patient taking differently: TAKE 1 TABLET BY MOUTH DAILY), Disp: 180 tablet, Rfl: 0    oxyCODONE-acetaminophen (PERCOCET) 5-325 mg per tablet, Take 1 tablet by mouth every 8 (eight) hours as needed., Disp: 30 tablet, Rfl: 0    ranitidine (ZANTAC) 300 MG tablet, 150 mg 2 (two) times daily. 0.5 tablet BID FOR REFLUX, Disp: , Rfl:     rOPINIRole (REQUIP) 1 MG tablet, , Disp: , Rfl:     simvastatin (ZOCOR) 40 MG tablet, TAKE 1 TABLET BY MOUTH AT BEDTIME FOR CHOLESTEROL, Disp: 90 tablet, Rfl: 1    tamsulosin (FLOMAX) 0.4 mg Cap, Take 1 capsule (0.4 mg total) by mouth once daily., Disp: 30 capsule, Rfl: 0  Blood pressure 138/88, height 5' 8" (1.727 m), weight 70.4 kg (155 lb 3.3 oz).  Vital Signs  BP: 138/88  Pain Score:   3  Pain Loc: Neck  Height and Weight  Height: 5' 8" (172.7 cm)  Weight: 70.4 kg (155 lb 3.3 oz)  BSA (Calculated - sq m): 1.84 sq meters  BMI (Calculated): 23.6  Weight in (lb) to have BMI = 25: " 164.1    Neurologic Exam     Mental Status   Oriented to person, place, and time.   Attention: normal. Concentration: normal.   Speech: speech is normal   Level of consciousness: alert  Knowledge: good.     Cranial Nerves     CN II   Visual acuity: normal    CN III, IV, VI   Pupils are equal, round, and reactive to light.  Extraocular motions are normal.     CN V   Facial sensation intact.     CN VII   Facial expression full, symmetric.     CN VIII   Hearing: intact    CN IX, X   Palate: symmetric    CN XI   CN XI normal.     CN XII   CN XII normal.     Motor Exam   Muscle bulk: normal  Overall muscle tone: normal  Right arm pronator drift: absent  Left arm pronator drift: absent    Strength   Right deltoid: 5/5  Left deltoid: 5/5  Right biceps: 5/5  Left biceps: 5/5  Right triceps: 5/5  Left triceps: 5/5  Right wrist flexion: 5/5  Left wrist flexion: 5/5  Right wrist extension: 5/5  Left wrist extension: 5/5  Right interossei: 5/5  Left interossei: 5/5  Right iliopsoas: 5/5  Left iliopsoas: 5/5  Right quadriceps: 5/5  Left quadriceps: 5/5  Right hamstrin/5  Left hamstrin/5  Right anterior tibial: 5/5  Left anterior tibial: 5/5  Right posterior tibial: 5/5  Left posterior tibial: 5/5  Right peroneal: 5/5  Left peroneal: 5/5  Right gastroc: 5/5  Left gastroc: 5/5    Sensory Exam   Light touch normal.     Gait, Coordination, and Reflexes     Gait  Gait: spastic    Coordination   Romberg: positive  Finger to nose coordination: normal    Tremor   Resting tremor: absent    Reflexes   Right brachioradialis: 3+  Left brachioradialis: 3+  Right biceps: 3+  Left biceps: 3+  Right triceps: 3+  Left triceps: 3+  Right patellar: 2+  Left patellar: 2+  Right achilles: 0  Left achilles: 0  Right plantar: equivocal  Left plantar: equivocal  Right Barahona: present  Left Barahona: present  Right ankle clonus: absent  Left ankle clonus: absent      Physical Exam   Constitutional: He is oriented to person, place, and time. He  "appears well-developed and well-nourished.   HENT:   Head: Normocephalic and atraumatic.   Eyes: Pupils are equal, round, and reactive to light. EOM are normal.   Neck: Normal range of motion. Neck supple.   Cardiovascular: Normal rate and regular rhythm.   Pulmonary/Chest: Effort normal.   Abdominal: Soft.   Musculoskeletal: Normal range of motion.   Neurological: He is alert and oriented to person, place, and time. He has an abnormal Romberg Test. He has a normal Finger-Nose-Finger Test.   Reflex Scores:       Tricep reflexes are 3+ on the right side and 3+ on the left side.       Bicep reflexes are 3+ on the right side and 3+ on the left side.       Brachioradialis reflexes are 3+ on the right side and 3+ on the left side.       Patellar reflexes are 2+ on the right side and 2+ on the left side.       Achilles reflexes are 0 on the right side and 0 on the left side.  Skin: Skin is warm and dry.   Psychiatric: He has a normal mood and affect. His speech is normal and behavior is normal. Judgment and thought content normal.   Nursing note and vitals reviewed.      Vital Signs  BP: 138/88  Pain Score:   3  Pain Loc: Neck  Height and Weight  Height: 5' 8" (172.7 cm)  Weight: 70.4 kg (155 lb 3.3 oz)  BSA (Calculated - sq m): 1.84 sq meters  BMI (Calculated): 23.6  Weight in (lb) to have BMI = 25: 164.1]    Provider dictation:  I reviewed the imaging. Spinal alignment and hardware are stable. There is no change in the curvature of the cervical spine.     Mr Dye has been doing exceptionally well. He is now able to walk without assistance. He continues to report some difficulty using his hands, L>R. He has minimal neck pain and actually feels like he has increased neck range of motion compared to before surgery. He is very pleased with the outcome of surgery.    On exam, he is myelopathic but has full strength in all muscle groups. His incisions are well healed.     F/U in 3 months with CT C spine without contrast " to track the fusion.       Visit Diagnosis:  Cervical cord compression with myelopathy

## 2019-04-28 DIAGNOSIS — E79.0 HYPERURICEMIA: ICD-10-CM

## 2019-04-28 DIAGNOSIS — M10.9 GOUT, ARTHRITIS: ICD-10-CM

## 2019-04-29 RX ORDER — ALLOPURINOL 100 MG/1
TABLET ORAL
Qty: 60 TABLET | Refills: 1 | Status: SHIPPED | OUTPATIENT
Start: 2019-04-29 | End: 2019-07-12 | Stop reason: SDUPTHER

## 2019-05-07 ENCOUNTER — HOSPITAL ENCOUNTER (OUTPATIENT)
Dept: RADIOLOGY | Facility: HOSPITAL | Age: 74
Discharge: HOME | End: 2019-05-07
Attending: PHYSICIAN ASSISTANT
Payer: MEDICARE

## 2019-05-07 DIAGNOSIS — Z98.1 S/P CERVICAL SPINAL FUSION: ICD-10-CM

## 2019-05-07 DIAGNOSIS — Z98.1 S/P CERVICAL SPINAL FUSION: Primary | ICD-10-CM

## 2019-05-07 PROCEDURE — 72040 X-RAY EXAM NECK SPINE 2-3 VW: CPT | Mod: 26,,, | Performed by: RADIOLOGY

## 2019-05-07 PROCEDURE — 72040 XR CERVICAL SPINE AP LATERAL: ICD-10-PCS | Mod: 26,,, | Performed by: RADIOLOGY

## 2019-05-07 PROCEDURE — 72040 X-RAY EXAM NECK SPINE 2-3 VW: CPT | Mod: TC,FY,PO

## 2019-05-08 ENCOUNTER — TELEPHONE (OUTPATIENT)
Dept: NEUROSURGERY | Facility: CLINIC | Age: 74
End: 2019-05-08

## 2019-05-08 NOTE — TELEPHONE ENCOUNTER
Called patient to review results of cervical x-ray which shows stable placement of instrumentation s/p fall. Patient informed to contact the clinic with any further questions or concerns.     ----- Message from America Angeles RN sent at 5/7/2019  3:23 PM CDT -----  Call with xray results

## 2019-05-10 RX ORDER — GABAPENTIN 300 MG/1
CAPSULE ORAL
Qty: 90 CAPSULE | Refills: 2 | Status: SHIPPED | OUTPATIENT
Start: 2019-05-10 | End: 2019-12-05 | Stop reason: SDUPTHER

## 2019-05-13 ENCOUNTER — PATIENT MESSAGE (OUTPATIENT)
Dept: RHEUMATOLOGY | Facility: CLINIC | Age: 74
End: 2019-05-13

## 2019-05-13 DIAGNOSIS — M79.641 PAIN IN BOTH HANDS: ICD-10-CM

## 2019-05-13 DIAGNOSIS — M79.642 PAIN IN BOTH HANDS: ICD-10-CM

## 2019-05-13 DIAGNOSIS — M11.20 PSEUDOGOUT: ICD-10-CM

## 2019-05-13 RX ORDER — COLCHICINE 0.6 MG/1
TABLET, FILM COATED ORAL
Qty: 60 TABLET | Refills: 1 | Status: SHIPPED | OUTPATIENT
Start: 2019-05-13 | End: 2019-11-06 | Stop reason: SDUPTHER

## 2019-05-16 ENCOUNTER — TELEPHONE (OUTPATIENT)
Dept: RHEUMATOLOGY | Facility: CLINIC | Age: 74
End: 2019-05-16

## 2019-05-16 DIAGNOSIS — M15.9 PRIMARY OSTEOARTHRITIS INVOLVING MULTIPLE JOINTS: ICD-10-CM

## 2019-05-16 DIAGNOSIS — M79.642 PAIN IN BOTH HANDS: ICD-10-CM

## 2019-05-16 DIAGNOSIS — R76.8 RHEUMATOID FACTOR POSITIVE: Primary | ICD-10-CM

## 2019-05-16 DIAGNOSIS — E79.0 HYPERURICEMIA: ICD-10-CM

## 2019-05-16 DIAGNOSIS — R53.83 FATIGUE, UNSPECIFIED TYPE: ICD-10-CM

## 2019-05-16 DIAGNOSIS — M79.641 PAIN IN BOTH HANDS: ICD-10-CM

## 2019-05-16 DIAGNOSIS — M11.20 PSEUDOGOUT: ICD-10-CM

## 2019-05-16 NOTE — TELEPHONE ENCOUNTER
----- Message from Pilar Hurd MA sent at 5/16/2019 12:33 PM CDT -----  Contact: Janene ( wife)      ----- Message -----  From: Rona Alcaraz  Sent: 5/16/2019  12:16 PM  To: [facility] Staff    Pt's wife stated that orders was supposed to be put in for the pt to have lab work    She stated they are currently @ the lab now waiting for the orders to be put in the system    Please contact pt's wife @ [phone]    Thank you

## 2019-05-17 ENCOUNTER — PATIENT MESSAGE (OUTPATIENT)
Dept: RHEUMATOLOGY | Facility: CLINIC | Age: 74
End: 2019-05-17

## 2019-05-20 ENCOUNTER — PATIENT MESSAGE (OUTPATIENT)
Dept: RHEUMATOLOGY | Facility: CLINIC | Age: 74
End: 2019-05-20

## 2019-05-20 ENCOUNTER — PATIENT MESSAGE (OUTPATIENT)
Dept: NEPHROLOGY | Facility: CLINIC | Age: 74
End: 2019-05-20

## 2019-05-29 ENCOUNTER — HOSPITAL ENCOUNTER (OUTPATIENT)
Dept: RADIOLOGY | Facility: HOSPITAL | Age: 74
Discharge: HOME | End: 2019-05-29
Attending: INTERNAL MEDICINE
Payer: MEDICARE

## 2019-05-29 ENCOUNTER — OFFICE VISIT (OUTPATIENT)
Dept: RHEUMATOLOGY | Facility: CLINIC | Age: 74
End: 2019-05-29
Payer: MEDICARE

## 2019-05-29 VITALS
BODY MASS INDEX: 25.46 KG/M2 | HEART RATE: 62 BPM | HEIGHT: 68 IN | WEIGHT: 168 LBS | SYSTOLIC BLOOD PRESSURE: 138 MMHG | DIASTOLIC BLOOD PRESSURE: 77 MMHG

## 2019-05-29 DIAGNOSIS — E79.0 HYPERURICEMIA: ICD-10-CM

## 2019-05-29 DIAGNOSIS — R76.8 RHEUMATOID FACTOR POSITIVE: ICD-10-CM

## 2019-05-29 DIAGNOSIS — M11.20 PSEUDOGOUT: ICD-10-CM

## 2019-05-29 DIAGNOSIS — R76.8 RHEUMATOID FACTOR POSITIVE: Primary | ICD-10-CM

## 2019-05-29 DIAGNOSIS — M79.641 PAIN IN BOTH HANDS: ICD-10-CM

## 2019-05-29 DIAGNOSIS — M79.642 PAIN IN BOTH HANDS: ICD-10-CM

## 2019-05-29 DIAGNOSIS — M15.9 PRIMARY OSTEOARTHRITIS INVOLVING MULTIPLE JOINTS: ICD-10-CM

## 2019-05-29 DIAGNOSIS — R53.83 FATIGUE, UNSPECIFIED TYPE: ICD-10-CM

## 2019-05-29 DIAGNOSIS — Z87.39 PERSONAL HISTORY OF CALCIUM PYROPHOSPHATE DEPOSITION DISEASE (CPPD): ICD-10-CM

## 2019-05-29 PROBLEM — R25.3 JERKING: Status: RESOLVED | Noted: 2018-12-20 | Resolved: 2019-05-29

## 2019-05-29 PROCEDURE — 3078F PR MOST RECENT DIASTOLIC BLOOD PRESSURE < 80 MM HG: ICD-10-PCS | Mod: CPTII,S$GLB,, | Performed by: INTERNAL MEDICINE

## 2019-05-29 PROCEDURE — 3078F DIAST BP <80 MM HG: CPT | Mod: CPTII,S$GLB,, | Performed by: INTERNAL MEDICINE

## 2019-05-29 PROCEDURE — 99215 PR OFFICE/OUTPT VISIT, EST, LEVL V, 40-54 MIN: ICD-10-PCS | Mod: S$GLB,,, | Performed by: INTERNAL MEDICINE

## 2019-05-29 PROCEDURE — 99215 OFFICE O/P EST HI 40 MIN: CPT | Mod: S$GLB,,, | Performed by: INTERNAL MEDICINE

## 2019-05-29 PROCEDURE — 77077 JOINT SURVEY SINGLE VIEW: CPT | Mod: TC

## 2019-05-29 PROCEDURE — 99999 PR PBB SHADOW E&M-EST. PATIENT-LVL IV: ICD-10-PCS | Mod: PBBFAC,,, | Performed by: INTERNAL MEDICINE

## 2019-05-29 PROCEDURE — 1101F PT FALLS ASSESS-DOCD LE1/YR: CPT | Mod: CPTII,S$GLB,, | Performed by: INTERNAL MEDICINE

## 2019-05-29 PROCEDURE — 3075F PR MOST RECENT SYSTOLIC BLOOD PRESS GE 130-139MM HG: ICD-10-PCS | Mod: CPTII,S$GLB,, | Performed by: INTERNAL MEDICINE

## 2019-05-29 PROCEDURE — 71046 XR CHEST PA AND LATERAL: ICD-10-PCS | Mod: 26,,, | Performed by: RADIOLOGY

## 2019-05-29 PROCEDURE — 3075F SYST BP GE 130 - 139MM HG: CPT | Mod: CPTII,S$GLB,, | Performed by: INTERNAL MEDICINE

## 2019-05-29 PROCEDURE — 77077 XR ARTHRITIS SURVEY: ICD-10-PCS | Mod: 26,,, | Performed by: RADIOLOGY

## 2019-05-29 PROCEDURE — 99999 PR PBB SHADOW E&M-EST. PATIENT-LVL IV: CPT | Mod: PBBFAC,,, | Performed by: INTERNAL MEDICINE

## 2019-05-29 PROCEDURE — 71046 X-RAY EXAM CHEST 2 VIEWS: CPT | Mod: TC

## 2019-05-29 PROCEDURE — 71046 X-RAY EXAM CHEST 2 VIEWS: CPT | Mod: 26,,, | Performed by: RADIOLOGY

## 2019-05-29 PROCEDURE — 77077 JOINT SURVEY SINGLE VIEW: CPT | Mod: 26,,, | Performed by: RADIOLOGY

## 2019-05-29 PROCEDURE — 1101F PR PT FALLS ASSESS DOC 0-1 FALLS W/OUT INJ PAST YR: ICD-10-PCS | Mod: CPTII,S$GLB,, | Performed by: INTERNAL MEDICINE

## 2019-05-29 ASSESSMENT — ROUTINE ASSESSMENT OF PATIENT INDEX DATA (RAPID3)
TOTAL RAPID3 SCORE: 7.22
FATIGUE SCORE: 9
PSYCHOLOGICAL DISTRESS SCORE: 2.2
PATIENT GLOBAL ASSESSMENT SCORE: 9
AM STIFFNESS SCORE: 1, YES
WHEN YOU AWAKENED IN THE MORNING OVER THE LAST WEEK, PLEASE INDICATE THE AMOUNT OF TIME IT TAKES UNTIL YOU ARE AS LIMBER AS YOU WILL BE FOR THE DAY: 2-3 HOURS
MDHAQ FUNCTION SCORE: 1.1
PAIN SCORE: 9

## 2019-05-29 NOTE — PROGRESS NOTES
"Subjective:       Patient ID: Rojelio Dye Sr. is a 73 y.o. male.    Chief Complaint: Joint pain    HPI:  Rojelio Dye Sr. is a 73 y.o. 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       Last visit 9/2013 for +RF but normal joint scan and inflammatory markers.  Consulted Dr. Olmstead who did surgeries on both feet.  Left foot now with infection.  Right foot has worsened after surgery.   Trouble with wound healing.  In past had to have PICC line for antibiotics.     Saw Dr. Olmstead couple weeks ago and he wants to pin another toe.  Now with infection in toe but antibiotics did not help.       Dr. Elijah [facility] on [facility] injects knees with (last 11/30/2018) helped left knee but not right knee.      Interval History:  Hospitalized 12/21/18 to 1/7/19 for cervical cord compression with myelopathy.  Had surgical anterior and posterior C4, 5 and 6 fusion.  Still dealing with balance issues, hands weak and pain.   Two falls due to balance.  Bones are not healing well.     Now with lower back pain, hands, feet and knees.   Knees injected 3 weeks ago that improved on left but not on right.       Review of Systems   Constitutional: Positive for fatigue and unexpected weight change (weight loss).   HENT: Negative.    Respiratory: Positive for shortness of breath (on exertion).    Endocrine: Negative.    Genitourinary: Negative.    Musculoskeletal: Positive for arthralgias and myalgias.   Allergic/Immunologic: Negative.    Neurological:        Off balance   Hematological: Bruises/bleeds easily.   Psychiatric/Behavioral: Negative.          Objective:   /77   Pulse 62   Ht 5' 7.5" (1.715 m)   Wt 76.2 kg (167 lb 15.9 oz)   BMI 25.92 kg/m²      Physical Exam   Constitutional: He is oriented to person, place, and time and well-developed, well-nourished, and in no distress.   HENT:   Head: Normocephalic and atraumatic.   Eyes: Conjunctivae and EOM are normal. "   Neck: Neck supple.   Cardiovascular: Normal rate and regular rhythm.    Pulmonary/Chest: Effort normal and breath sounds normal.   Abdominal: Soft. Bowel sounds are normal.   Neurological: He is alert and oriented to person, place, and time.   Slow stiff gait.  Jerkiness of legs involuntarily   Skin: Skin is warm and dry.     Psychiatric: Affect normal.   Musculoskeletal:   28 joint count: 0 swollen and 2 painful (knees)  Right 3rd PIP lax joint    Deformity feet  No MTP tenderness              Labs  Dr. Grajeda's labs 12/18/18  ESR= 33 (nl 0-15) however labs sat for long time therefore not valid  CRP=0.2  (nl<0.5)  LOUISE negative  Rheumatoid factor=12 (nl<14)  UT=146 (ap67-211)    Assessment:       1. Arthralgias. OA vs. RA vs PsA vs Polyarticular Gout; positive RF but in past but now negative normal ESR and CRP previously; erosions on X-ray right 1st MTP and hands; Now with diffuse pain.  Right middle PIP very mobile  2. History of osteoarthrosis in multiple joints. Erosive OA on x-ray  3. Chondrocalinosis  4. Positive rheumatoid factor in past.  Now negative  5. Diabetes.  Not on any medications.  Tragenta caused glucose to drop  6. Schwanoma.  In past located outside of the lungs  7. Hyperuricemia  8.  Psoriasis diagnosed 2017  9.  Stage 3 renal disease  10. Cervical cord compression with myelopathy.    11.  SOB  12.  Fatigue  Plan:       1.  CXR and arthritis survey  2.  Consider MTX versus plaquenil (but psoriasis may exacerbate)  3.  Continue colchicine and consider adding allopurinol 100 mg daily.  Reviewed medications for interactions.       RTO 3 months/prn

## 2019-05-29 NOTE — PATIENT INSTRUCTIONS
Methotrexate tablets  What is this medicine?  METHOTREXATE (METH oh TREX ate) is a chemotherapy drug used to treat cancer including breast cancer, leukemia, and lymphoma. This medicine can also be used to treat psoriasis and certain kinds of arthritis.  How should I use this medicine?  Take this medicine by mouth with a glass of water. Follow the directions on the prescription label. Take your medicine at regular intervals. Do not take it more often than directed. Do not stop taking except on your doctor's advice.  Make sure you know why you are taking this medicine and how often you should take it. If this medicine is used for a condition that is not cancer, like arthritis or psoriasis, it should be taken weekly, NOT daily. Taking this medicine more often than directed can cause serious side effects, even death.  Talk to your healthcare provider about safe handling and disposal of this medicine. You may need to take special precautions.  Talk to your pediatrician regarding the use of this medicine in children. While this drug may be prescribed for selected conditions, precautions do apply.  What side effects may I notice from receiving this medicine?  Side effects that you should report to your doctor or health care professional as soon as possible:  · allergic reactions like skin rash, itching or hives, swelling of the face, lips, or tongue  · breathing problems or shortness of breath  · diarrhea  · dry, nonproductive cough  · low blood counts - this medicine may decrease the number of white blood cells, red blood cells and platelets. You may be at increased risk for infections and bleeding.  · mouth sores  · redness, blistering, peeling or loosening of the skin, including inside the mouth  · signs of infection - fever or chills, cough, sore throat, pain or trouble passing urine  · signs and symptoms of bleeding such as bloody or black, tarry stools; red or dark-brown urine; spitting up blood or brown material  that looks like coffee grounds; red spots on the skin; unusual bruising or bleeding from the eye, gums, or nose  · signs and symptoms of kidney injury like trouble passing urine or change in the amount of urine  · signs and symptoms of liver injury like dark yellow or brown urine; general ill feeling or flu-like symptoms; light-colored stools; loss of appetite; nausea; right upper belly pain; unusually weak or tired; yellowing of the eyes or skin  Side effects that usually do not require medical attention (report to your doctor or health care professional if they continue or are bothersome):  · dizziness  · hair loss  · tiredness  · upset stomach  · vomiting  What may interact with this medicine?  This medicine may interact with the following medication:  · acitretin  · aspirin and aspirin-like medicines including salicylates  · azathioprine  · certain antibiotics like penicillins, tetracycline, and chloramphenicol  · cyclosporine  · gold  · hydroxychloroquine  · live virus vaccines  · NSAIDs, medicines for pain and inflammation, like ibuprofen or naproxen  · other cytotoxic agents  · penicillamine  · phenylbutazone  · phenytoin  · probenecid  · retinoids such as isotretinoin and tretinoin  · steroid medicines like prednisone or cortisone  · sulfonamides like sulfasalazine and trimethoprim/sulfamethoxazole  · theophylline  What if I miss a dose?  If you miss a dose, talk with your doctor or health care professional. Do not take double or extra doses.  Where should I keep my medicine?  Keep out of the reach of children.  Store at room temperature between 20 and 25 degrees C (68 and 77 degrees F). Protect from light. Throw away any unused medicine after the expiration date.  What should I tell my health care provider before I take this medicine?  They need to know if you have any of these conditions:  · fluid in the stomach area or lungs  · if you often drink alcohol  · infection or immune system problems  · kidney  disease or on hemodialysis  · liver disease  · low blood counts, like low white cell, platelet, or red cell counts  · lung disease  · radiation therapy  · stomach ulcers  · ulcerative colitis  · an unusual or allergic reaction to methotrexate, other medicines, foods, dyes, or preservatives  · pregnant or trying to get pregnant  · breast-feeding  What should I watch for while using this medicine?  Avoid alcoholic drinks.  This medicine can make you more sensitive to the sun. Keep out of the sun. If you cannot avoid being in the sun, wear protective clothing and use sunscreen. Do not use sun lamps or tanning beds/booths.  You may need blood work done while you are taking this medicine.  Call your doctor or health care professional for advice if you get a fever, chills or sore throat, or other symptoms of a cold or flu. Do not treat yourself. This drug decreases your body's ability to fight infections. Try to avoid being around people who are sick.  This medicine may increase your risk to bruise or bleed. Call your doctor or health care professional if you notice any unusual bleeding.  Check with your doctor or health care professional if you get an attack of severe diarrhea, nausea and vomiting, or if you sweat a lot. The loss of too much body fluid can make it dangerous for you to take this medicine.  Talk to your doctor about your risk of cancer. You may be more at risk for certain types of cancers if you take this medicine.  Both men and women must use effective birth control with this medicine. Do not become pregnant while taking this medicine or until at least 1 normal menstrual cycle has occurred after stopping it. Women should inform their doctor if they wish to become pregnant or think they might be pregnant. Men should not father a child while taking this medicine and for 3 months after stopping it. There is a potential for serious side effects to an unborn child. Talk to your health care professional or  pharmacist for more information. Do not breast-feed an infant while taking this medicine.  NOTE:This sheet is a summary. It may not cover all possible information. If you have questions about this medicine, talk to your doctor, pharmacist, or health care provider. Copyright© 2017 [employer]        Hydroxychloroquine tablets  What is this medicine?  HYDROXYCHLOROQUINE (rhiannon drox ee KLOR oh kwin) is used to treat rheumatoid arthritis and systemic lupus erythematosus. It is also used to treat malaria.  How should I use this medicine?  Take this medicine by mouth with a glass of water. Follow the directions on the prescription label. If this medicine upsets your stomach take it with food or milk. Take your doses at regular intervals. Do not take your medicine more often than directed.  Talk to your pediatrician regarding the use of this medicine in children. Special care may be needed.  What side effects may I notice from receiving this medicine?  Side effects that you should report to your doctor or health care professional as soon as possible:  · allergic reactions like skin rash, itching or hives, swelling of the face, lips, or tongue  · change in vision  · fever, infection  · hearing loss or ringing  · muscle weakness, tremor, or numbness  · redness, blistering, peeling or loosening of the skin, including inside the mouth  · seizures  · unusual bleeding or bruising  · unusually weak or tired  Side effects that usually do not require medical attention (report to your doctor or health care professional if they continue or are bothersome):  · change in coloration of the mouth or skin  · dizziness  · hair loss, lightening  · headache  · irritability, nervousness, nightmares  · loss of appetite  · stomach upset, diarrhea  What may interact with this medicine?  · antacids  · botulinum toxins  · digoxin  · kaolin  · penicillamine  What if I miss a dose?  If you miss a dose, take it as soon as you can. If it is almost  time for your next dose, take only that dose. Do not take double or extra doses.  Where should I keep my medicine?  Keep out of the reach of children. In children, this medicine can cause overdose with small doses.  Store at room temperature between 15 and 30 degrees C (59 and 86 degrees F). Protect from moisture and light. Throw away any unused medicine after the expiration date.  What should I tell my health care provider before I take this medicine?  They need to know if you have any of these conditions:  · alcoholism  · anemia or other blood disorder  · eye disease  · glucose 6-phosphate dehydrogenase (G6PD) deficiency  · liver disease  · porphyria  · psoriasis  · an unusual or allergic reaction to chloroquine, hydroxychloroquine, other medicines, foods, dyes, or preservatives  · pregnant or trying to get pregnant  · breast-feeding  What should I watch for while using this medicine?  Visit your doctor or health care professional for regular check ups. Tell your doctor if your symptoms do not improve. Arthritis symptoms may take several weeks to improve. If you are taking this medicine for a long time, you will need important blood work done. You will also need to have your eyes checked as directed.  This medicine can make you more sensitive to the sun. Keep out of the sun. If you cannot avoid being in the sun, wear protective clothing and use sunscreen. Do not use sun lamps or tanning beds/booths.  Avoid antacids and kaolin containing products for 2 hours before and after taking a dose of this medicine.  NOTE:This sheet is a summary. It may not cover all possible information. If you have questions about this medicine, talk to your doctor, pharmacist, or health care provider. Copyright© 2017 [employer]

## 2019-05-30 ENCOUNTER — PATIENT MESSAGE (OUTPATIENT)
Dept: RHEUMATOLOGY | Facility: CLINIC | Age: 74
End: 2019-05-30

## 2019-06-05 ENCOUNTER — TELEPHONE (OUTPATIENT)
Dept: NEPHROLOGY | Facility: CLINIC | Age: 74
End: 2019-06-05

## 2019-06-05 ENCOUNTER — OFFICE VISIT (OUTPATIENT)
Dept: NEPHROLOGY | Facility: CLINIC | Age: 74
End: 2019-06-05
Payer: MEDICARE

## 2019-06-05 VITALS
WEIGHT: 168.44 LBS | OXYGEN SATURATION: 98 % | HEART RATE: 64 BPM | DIASTOLIC BLOOD PRESSURE: 78 MMHG | SYSTOLIC BLOOD PRESSURE: 162 MMHG | HEIGHT: 67 IN | BODY MASS INDEX: 26.44 KG/M2

## 2019-06-05 DIAGNOSIS — N20.0 CALCULUS OF KIDNEY: ICD-10-CM

## 2019-06-05 DIAGNOSIS — I10 BENIGN ESSENTIAL HYPERTENSION: ICD-10-CM

## 2019-06-05 DIAGNOSIS — N28.1 ACQUIRED CYST OF KIDNEY: ICD-10-CM

## 2019-06-05 DIAGNOSIS — N18.30 CHRONIC KIDNEY DISEASE, STAGE III (MODERATE): Primary | ICD-10-CM

## 2019-06-05 PROCEDURE — 3077F PR MOST RECENT SYSTOLIC BLOOD PRESSURE >= 140 MM HG: ICD-10-PCS | Mod: CPTII,S$GLB,, | Performed by: INTERNAL MEDICINE

## 2019-06-05 PROCEDURE — 99214 PR OFFICE/OUTPT VISIT, EST, LEVL IV, 30-39 MIN: ICD-10-PCS | Mod: S$GLB,,, | Performed by: INTERNAL MEDICINE

## 2019-06-05 PROCEDURE — 99214 OFFICE O/P EST MOD 30 MIN: CPT | Mod: S$GLB,,, | Performed by: INTERNAL MEDICINE

## 2019-06-05 PROCEDURE — 3077F SYST BP >= 140 MM HG: CPT | Mod: CPTII,S$GLB,, | Performed by: INTERNAL MEDICINE

## 2019-06-05 PROCEDURE — 99999 PR PBB SHADOW E&M-EST. PATIENT-LVL III: CPT | Mod: PBBFAC,,, | Performed by: INTERNAL MEDICINE

## 2019-06-05 PROCEDURE — 3078F DIAST BP <80 MM HG: CPT | Mod: CPTII,S$GLB,, | Performed by: INTERNAL MEDICINE

## 2019-06-05 PROCEDURE — 1101F PR PT FALLS ASSESS DOC 0-1 FALLS W/OUT INJ PAST YR: ICD-10-PCS | Mod: CPTII,S$GLB,, | Performed by: INTERNAL MEDICINE

## 2019-06-05 PROCEDURE — 99999 PR PBB SHADOW E&M-EST. PATIENT-LVL III: ICD-10-PCS | Mod: PBBFAC,,, | Performed by: INTERNAL MEDICINE

## 2019-06-05 PROCEDURE — 3078F PR MOST RECENT DIASTOLIC BLOOD PRESSURE < 80 MM HG: ICD-10-PCS | Mod: CPTII,S$GLB,, | Performed by: INTERNAL MEDICINE

## 2019-06-05 PROCEDURE — 1101F PT FALLS ASSESS-DOCD LE1/YR: CPT | Mod: CPTII,S$GLB,, | Performed by: INTERNAL MEDICINE

## 2019-06-05 NOTE — PROGRESS NOTES
"Subjective:       Patient ID: Rojelio Dye Sr. is a 73 y.o. White male who presents for return patient evaluation for chronic renal failure.    He had emergency Neurosurgery on his neck late last year and now has weakness in his hand with numbness.  He also has had his blood pressure be less well controlled as soon as he received a steroid injection in his knees.  Previously his SBP was in the 140s consistently.      Review of Systems   Constitutional: Positive for appetite change and unexpected weight change. Negative for chills and fever.   HENT: Negative for congestion.    Eyes: Negative for visual disturbance.   Respiratory: Negative for cough and shortness of breath.    Cardiovascular: Negative for chest pain and leg swelling.   Gastrointestinal: Negative for abdominal pain, diarrhea, nausea and vomiting.   Genitourinary: Negative for difficulty urinating, dysuria and hematuria.   Musculoskeletal: Positive for arthralgias (hands and feet), back pain, gait problem and neck pain. Negative for myalgias.   Skin: Negative for rash.   Neurological: Positive for dizziness, tremors, weakness and numbness. Negative for headaches.   Hematological: Bruises/bleeds easily.   Psychiatric/Behavioral: Negative for sleep disturbance.       The past medical, family and social histories were reviewed for this encounter.     BP (!) 162/78 (BP Location: Right arm, Patient Position: Sitting)   Pulse 64   Ht 5' 7" (1.702 m)   Wt 76.4 kg (168 lb 6.9 oz)   SpO2 98%   BMI 26.38 kg/m²     Objective:      Physical Exam   Constitutional: He appears well-developed. No distress.   Ill-appearing   HENT:   Head: Normocephalic and atraumatic.   Eyes: Conjunctivae are normal. No scleral icterus.   Neck: Normal range of motion. No JVD present.   Cardiovascular: Normal rate, regular rhythm and normal heart sounds. Exam reveals no gallop and no friction rub.   No murmur heard.  Pulmonary/Chest: Effort normal and breath sounds normal. No " respiratory distress. He has no wheezes.   Abdominal: Soft. Bowel sounds are normal. He exhibits no distension. There is no tenderness.   Musculoskeletal: He exhibits no edema.   Skin: Skin is warm and dry. No rash noted.   Psychiatric: He has a normal mood and affect.   Vitals reviewed.      Assessment:       1. Chronic kidney disease, stage III (moderate)    2. Benign essential hypertension    3. Calculus of kidney    4. Acquired cyst of kidney        Plan:   Return to clinic in 4 months.  Labs for today include rp, pth, uric acid, renal US.  Baseline creatinine is 1.5-2.0 since 2015.  PTH is 51 with a calcium of 9.5.  Renal US shows R 12.0 cm, L 11.3 cm.  I suspect he has had some change in his renal function due to his chronic use of NSAIDS and now hypotension.

## 2019-06-10 ENCOUNTER — PATIENT MESSAGE (OUTPATIENT)
Dept: RHEUMATOLOGY | Facility: HOSPITAL | Age: 74
End: 2019-06-10

## 2019-06-24 ENCOUNTER — OFFICE VISIT (OUTPATIENT)
Dept: NEUROSURGERY | Facility: CLINIC | Age: 74
End: 2019-06-24
Payer: MEDICARE

## 2019-06-24 VITALS
DIASTOLIC BLOOD PRESSURE: 77 MMHG | BODY MASS INDEX: 26.44 KG/M2 | WEIGHT: 168.44 LBS | SYSTOLIC BLOOD PRESSURE: 150 MMHG | HEART RATE: 76 BPM | HEIGHT: 67 IN

## 2019-06-24 DIAGNOSIS — Z98.1 S/P CERVICAL SPINAL FUSION: ICD-10-CM

## 2019-06-24 PROCEDURE — 3078F DIAST BP <80 MM HG: CPT | Mod: CPTII,S$GLB,, | Performed by: NEUROLOGICAL SURGERY

## 2019-06-24 PROCEDURE — 99214 PR OFFICE/OUTPT VISIT, EST, LEVL IV, 30-39 MIN: ICD-10-PCS | Mod: S$GLB,,, | Performed by: NEUROLOGICAL SURGERY

## 2019-06-24 PROCEDURE — 3078F PR MOST RECENT DIASTOLIC BLOOD PRESSURE < 80 MM HG: ICD-10-PCS | Mod: CPTII,S$GLB,, | Performed by: NEUROLOGICAL SURGERY

## 2019-06-24 PROCEDURE — 1101F PT FALLS ASSESS-DOCD LE1/YR: CPT | Mod: CPTII,S$GLB,, | Performed by: NEUROLOGICAL SURGERY

## 2019-06-24 PROCEDURE — 3077F SYST BP >= 140 MM HG: CPT | Mod: CPTII,S$GLB,, | Performed by: NEUROLOGICAL SURGERY

## 2019-06-24 PROCEDURE — 1101F PR PT FALLS ASSESS DOC 0-1 FALLS W/OUT INJ PAST YR: ICD-10-PCS | Mod: CPTII,S$GLB,, | Performed by: NEUROLOGICAL SURGERY

## 2019-06-24 PROCEDURE — 99999 PR PBB SHADOW E&M-EST. PATIENT-LVL III: CPT | Mod: PBBFAC,,, | Performed by: NEUROLOGICAL SURGERY

## 2019-06-24 PROCEDURE — 3077F PR MOST RECENT SYSTOLIC BLOOD PRESSURE >= 140 MM HG: ICD-10-PCS | Mod: CPTII,S$GLB,, | Performed by: NEUROLOGICAL SURGERY

## 2019-06-24 PROCEDURE — 99999 PR PBB SHADOW E&M-EST. PATIENT-LVL III: ICD-10-PCS | Mod: PBBFAC,,, | Performed by: NEUROLOGICAL SURGERY

## 2019-06-24 PROCEDURE — 99214 OFFICE O/P EST MOD 30 MIN: CPT | Mod: S$GLB,,, | Performed by: NEUROLOGICAL SURGERY

## 2019-06-24 RX ORDER — AMLODIPINE BESYLATE 5 MG/1
10 TABLET ORAL DAILY
COMMUNITY
End: 2020-02-18

## 2019-06-24 NOTE — PROGRESS NOTES
Neurosurgery Outpatient Follow Up    Patient ID: Rojelio Dye Sr. is a 73 y.o. male.    No chief complaint on file.        Review of Systems   Constitutional: Negative.    HENT: Negative.    Eyes: Negative.    Respiratory: Negative.    Cardiovascular: Negative.    Gastrointestinal: Negative.    Endocrine: Negative.    Genitourinary: Negative.    Musculoskeletal: Positive for neck pain. Negative for neck stiffness.   Skin: Negative.    Allergic/Immunologic: Negative.    Neurological: Positive for weakness.   Hematological: Negative.    Psychiatric/Behavioral: Negative.        Past Medical History:   Diagnosis Date    Acid reflux     Acquired hammer toe of right foot 8/29/2014    Arthritis of foot 12/9/2015    Benign essential hypertension 4/23/2014    BPH with urinary obstruction 12/10/2015    CKD (chronic kidney disease), stage II 4/23/2014    Followed by Dr. Rodriguez Brandt    Encounter for blood transfusion     Hypercholesterolemia without hypertriglyceridemia 7/17/2004    Mild intermittent asthma without complication 4/23/2014    Narcolepsy     ADRIANNA on CPAP 4/23/2014    Osteoarthrosis involving, or with mention of more than one site, but not specified as generalized, multiple sites 12/28/2011    Pericarditis     Pes planovalgus 5/5/2014    Pre-diabetes 4/23/2014     Social History     Socioeconomic History    Marital status:      Spouse name: Not on file    Number of children: Not on file    Years of education: Not on file    Highest education level: Not on file   Occupational History    Not on file   Social Needs    Financial resource strain: Not on file    Food insecurity:     Worry: Not on file     Inability: Not on file    Transportation needs:     Medical: Not on file     Non-medical: Not on file   Tobacco Use    Smoking status: Never Smoker    Smokeless tobacco: Never Used    Tobacco comment: 2nd Hand -Father smoked 3 packs a day   Substance and Sexual Activity    Alcohol  use: Yes     Comment: occ    Drug use: No    Sexual activity: Not Currently     Partners: Female     Birth control/protection: None   Lifestyle    Physical activity:     Days per week: Not on file     Minutes per session: Not on file    Stress: Not on file   Relationships    Social connections:     Talks on phone: Not on file     Gets together: Not on file     Attends Tenriism service: Not on file     Active member of club or organization: Not on file     Attends meetings of clubs or organizations: Not on file     Relationship status: Not on file   Other Topics Concern    Not on file   Social History Narrative    Not on file     Family History   Problem Relation Age of Onset    Osteoporosis Mother     Heart disease Father     Rheum arthritis Neg Hx     Lupus Neg Hx      Review of patient's allergies indicates:   Allergen Reactions    Sulfamethoxazole-trimethoprim Rash    Mobic [meloxicam] Hives       Current Outpatient Medications:     acetaminophen (TYLENOL) 650 MG TbSR, Take 650 mg by mouth every evening. , Disp: , Rfl:     allopurinol (ZYLOPRIM) 100 MG tablet, TAKE 1 TABLET EVERY DAY, Disp: 60 tablet, Rfl: 1    arginine 500 mg tablet, Take 500 mg by mouth 2 (two) times daily. , Disp: , Rfl:     azelastine (ASTEPRO) 0.15 % (205.5 mcg) Spry, daily as needed. 2 Spray, Non-Aerosol Nasal Every evening, Disp: , Rfl:     carvedilol (COREG) 25 MG tablet, Take 1 tablet (25 mg total) by mouth 2 (two) times daily., Disp: 60 tablet, Rfl: 0    cloNIDine (CATAPRES) 0.1 MG tablet, Take 1 tablet (0.1 mg total) by mouth every 8 (eight) hours as needed (Systolic blood pressure greater than 150)., Disp: 30 tablet, Rfl: 0    COLCRYS 0.6 mg tablet, TAKE 1 TABLET BY MOUTH TWICE DAILY, AS DIRECTED, Disp: 60 tablet, Rfl: 1    diclofenac sodium (VOLTAREN) 1 % Gel, Apply 2 g topically 2 (two) times daily. , Disp: , Rfl:     DULoxetine (CYMBALTA) 30 MG capsule, 30 mg once daily. , Disp: , Rfl: 0    fluticasone  (FLONASE) 50 mcg/actuation nasal spray, 2 sprays (100 mcg total) by Each Nare route once daily., Disp: 1 Bottle, Rfl: 0    fluticasone-vilanterol (BREO) 100-25 mcg/dose diskus inhaler, Inhale 1 puff into the lungs once daily. Controller, Disp: 1 each, Rfl: 0    gabapentin (NEURONTIN) 300 MG capsule, TAKE 1 CAPSULE BY MOUTH AT BEDTIME FOR 1 DAY, THEN MAY INCREASE TO 2 CAPSULES PER DAY, THEN IF NEEDED MAY INCREASE TO 3 CAPSULES PER DAY AS, Disp: 90 capsule, Rfl: 2    hydrALAZINE (APRESOLINE) 50 MG tablet, Take 1 tablet (50 mg total) by mouth every 12 (twelve) hours. (Patient taking differently: Take 50 mg by mouth every 12 (twelve) hours. ), Disp: 60 tablet, Rfl: 0    hydroCHLOROthiazide (HYDRODIURIL) 12.5 MG Tab, Take 1 tablet (12.5 mg total) by mouth once daily., Disp: 30 tablet, Rfl: 0    montelukast (SINGULAIR) 10 mg tablet, , Disp: , Rfl:     multivitamin with minerals tablet, once daily. , Disp: , Rfl:     naproxen (NAPROSYN) 500 MG tablet, TAKE 1 TABLET BY MOUTH TWICE DAILY (Patient taking differently: TAKE 1 TABLET BY MOUTH DAILY), Disp: 180 tablet, Rfl: 0    oxyCODONE-acetaminophen (PERCOCET) 5-325 mg per tablet, Take 1 tablet by mouth every 8 (eight) hours as needed., Disp: 30 tablet, Rfl: 0    ranitidine (ZANTAC) 300 MG tablet, 150 mg 2 (two) times daily. 0.5 tablet BID FOR REFLUX, Disp: , Rfl:     rOPINIRole (REQUIP) 1 MG tablet, 2 mg. , Disp: , Rfl:     simvastatin (ZOCOR) 40 MG tablet, TAKE 1 TABLET BY MOUTH AT BEDTIME FOR CHOLESTEROL, Disp: 90 tablet, Rfl: 1    tamsulosin (FLOMAX) 0.4 mg Cap, Take 1 capsule (0.4 mg total) by mouth once daily., Disp: 30 capsule, Rfl: 0  There were no vitals taken for this visit.       Neurologic Exam     Mental Status   Oriented to person, place, and time.   Attention: normal. Concentration: normal.   Speech: speech is normal   Level of consciousness: alert  Knowledge: good.     Cranial Nerves     CN II   Visual acuity: normal    CN III, IV, VI   Pupils are  equal, round, and reactive to light.  Extraocular motions are normal.     CN V   Facial sensation intact.     CN VII   Facial expression full, symmetric.     CN VIII   Hearing: intact    CN IX, X   Palate: symmetric    CN XI   CN XI normal.     CN XII   CN XII normal.     Motor Exam   Muscle bulk: normal  Overall muscle tone: normal  Right arm pronator drift: absent  Left arm pronator drift: absent    Strength   Right deltoid: 5/5  Left deltoid: 5/5  Right biceps: 5/5  Left biceps: 5/5  Right triceps: 5/5  Left triceps: 5/5  Right wrist flexion: 5/5  Left wrist flexion: 5/5  Right wrist extension: 5/5  Left wrist extension: 5/5  Right interossei: 5/5  Left interossei: 5/5  Right iliopsoas: 5/5  Left iliopsoas: 5/5  Right quadriceps: 5/5  Left quadriceps: 5/5  Right hamstrin/5  Left hamstrin/5  Right anterior tibial: 5/5  Left anterior tibial: 5/5  Right posterior tibial: 5/5  Left posterior tibial: 5/5  Right peroneal: 5/5  Left peroneal: 5/5  Right gastroc: 5/5  Left gastroc: 5/5    Sensory Exam   Light touch normal.     Gait, Coordination, and Reflexes     Gait  Gait: spastic    Coordination   Romberg: positive  Finger to nose coordination: normal    Tremor   Resting tremor: absent    Reflexes   Right brachioradialis: 3+  Left brachioradialis: 3+  Right biceps: 3+  Left biceps: 3+  Right triceps: 3+  Left triceps: 3+  Right patellar: 2+  Left patellar: 2+  Right achilles: 0  Left achilles: 0  Right plantar: equivocal  Left plantar: equivocal  Right Barahona: absent  Left Barahona: absent  Right ankle clonus: absent  Left ankle clonus: absent      Physical Exam   Constitutional: He is oriented to person, place, and time. He appears well-developed and well-nourished.   HENT:   Head: Normocephalic and atraumatic.   Eyes: Pupils are equal, round, and reactive to light. EOM are normal.   Neck: Normal range of motion. Neck supple.   Cardiovascular: Normal rate and regular rhythm.   Pulmonary/Chest: Effort normal.    Abdominal: Soft.   Musculoskeletal: Normal range of motion.   Neurological: He is alert and oriented to person, place, and time. He has an abnormal Romberg Test. He has a normal Finger-Nose-Finger Test.   Reflex Scores:       Tricep reflexes are 3+ on the right side and 3+ on the left side.       Bicep reflexes are 3+ on the right side and 3+ on the left side.       Brachioradialis reflexes are 3+ on the right side and 3+ on the left side.       Patellar reflexes are 2+ on the right side and 2+ on the left side.       Achilles reflexes are 0 on the right side and 0 on the left side.  Skin: Skin is warm and dry.   Psychiatric: He has a normal mood and affect. His speech is normal and behavior is normal. Judgment and thought content normal.   Nursing note and vitals reviewed.       ]    Provider dictation:  I reviewed the imaging. Spinal alignment and hardware are stable. There is no change in the curvature of the cervical spine. There is good bony fusion both anteriorly and posteriorly from C3 to C5    Mr Dye has been doing exceptionally well. He is now able to walk without assistance. He continues to report some difficulty using his hands, L>R. He has pain over the left clavicle and sternoclavicular joint. He continues to C/o difficulty transitioning from sitting to standing from a combination of proximal leg weakness and RA-related arthropathy.    On exam, he is myelopathic but has full strength in all muscle groups. He no longer has a Barahona's. His incisions are well healed.     He has discussed starting methotrexate with his rheumatologist and wanted to know if it was safe from a post op standpoint. There is no contraindication from my standpoint, as he has achieved excellent bony fusion.     No further routine neurosurgical follow up is indicated at this point. He may follow up as needed.    Visit Diagnosis:  S/P cervical spinal fusion

## 2019-06-25 ENCOUNTER — TELEPHONE (OUTPATIENT)
Dept: NEUROSURGERY | Facility: CLINIC | Age: 74
End: 2019-06-25

## 2019-06-25 NOTE — TELEPHONE ENCOUNTER
Spoke with the patient and he declined to schedule an appointment right now. He is visiting the heart doctor right now with his blood pressure. He will call the office back at a later date if he wants to schedule an appointment.

## 2019-07-12 DIAGNOSIS — M10.9 GOUT, ARTHRITIS: ICD-10-CM

## 2019-07-12 DIAGNOSIS — E79.0 HYPERURICEMIA: ICD-10-CM

## 2019-07-12 RX ORDER — ALLOPURINOL 100 MG/1
TABLET ORAL
Qty: 60 TABLET | Refills: 1 | Status: SHIPPED | OUTPATIENT
Start: 2019-07-12 | End: 2019-10-02 | Stop reason: SDUPTHER

## 2019-08-16 ENCOUNTER — PATIENT MESSAGE (OUTPATIENT)
Dept: RHEUMATOLOGY | Facility: CLINIC | Age: 74
End: 2019-08-16

## 2019-08-20 ENCOUNTER — OFFICE VISIT (OUTPATIENT)
Dept: RHEUMATOLOGY | Facility: CLINIC | Age: 74
End: 2019-08-20
Payer: MEDICARE

## 2019-08-20 ENCOUNTER — LAB VISIT (OUTPATIENT)
Dept: LAB | Facility: HOSPITAL | Age: 74
End: 2019-08-20
Attending: INTERNAL MEDICINE
Payer: MEDICARE

## 2019-08-20 VITALS
SYSTOLIC BLOOD PRESSURE: 136 MMHG | WEIGHT: 171.94 LBS | DIASTOLIC BLOOD PRESSURE: 77 MMHG | HEART RATE: 64 BPM | BODY MASS INDEX: 26.06 KG/M2 | HEIGHT: 68 IN

## 2019-08-20 DIAGNOSIS — M79.642 PAIN IN BOTH HANDS: ICD-10-CM

## 2019-08-20 DIAGNOSIS — M79.641 PAIN IN BOTH HANDS: ICD-10-CM

## 2019-08-20 DIAGNOSIS — R53.83 FATIGUE, UNSPECIFIED TYPE: ICD-10-CM

## 2019-08-20 DIAGNOSIS — E79.0 HYPERURICEMIA: ICD-10-CM

## 2019-08-20 DIAGNOSIS — M11.20 PSEUDOGOUT: ICD-10-CM

## 2019-08-20 DIAGNOSIS — L40.50 PSORIATIC ARTHRITIS: ICD-10-CM

## 2019-08-20 DIAGNOSIS — L40.50 PSORIATIC ARTHRITIS: Primary | ICD-10-CM

## 2019-08-20 DIAGNOSIS — R76.8 RHEUMATOID FACTOR POSITIVE: ICD-10-CM

## 2019-08-20 DIAGNOSIS — N18.2 CKD (CHRONIC KIDNEY DISEASE), STAGE II: ICD-10-CM

## 2019-08-20 PROCEDURE — 3078F PR MOST RECENT DIASTOLIC BLOOD PRESSURE < 80 MM HG: ICD-10-PCS | Mod: CPTII,S$GLB,, | Performed by: INTERNAL MEDICINE

## 2019-08-20 PROCEDURE — 99215 OFFICE O/P EST HI 40 MIN: CPT | Mod: S$GLB,,, | Performed by: INTERNAL MEDICINE

## 2019-08-20 PROCEDURE — 99999 PR PBB SHADOW E&M-EST. PATIENT-LVL III: CPT | Mod: PBBFAC,,, | Performed by: INTERNAL MEDICINE

## 2019-08-20 PROCEDURE — 99999 PR PBB SHADOW E&M-EST. PATIENT-LVL III: ICD-10-PCS | Mod: PBBFAC,,, | Performed by: INTERNAL MEDICINE

## 2019-08-20 PROCEDURE — 3078F DIAST BP <80 MM HG: CPT | Mod: CPTII,S$GLB,, | Performed by: INTERNAL MEDICINE

## 2019-08-20 PROCEDURE — 36415 COLL VENOUS BLD VENIPUNCTURE: CPT

## 2019-08-20 PROCEDURE — 3075F SYST BP GE 130 - 139MM HG: CPT | Mod: CPTII,S$GLB,, | Performed by: INTERNAL MEDICINE

## 2019-08-20 PROCEDURE — 86480 TB TEST CELL IMMUN MEASURE: CPT

## 2019-08-20 PROCEDURE — 1101F PR PT FALLS ASSESS DOC 0-1 FALLS W/OUT INJ PAST YR: ICD-10-PCS | Mod: CPTII,S$GLB,, | Performed by: INTERNAL MEDICINE

## 2019-08-20 PROCEDURE — 1101F PT FALLS ASSESS-DOCD LE1/YR: CPT | Mod: CPTII,S$GLB,, | Performed by: INTERNAL MEDICINE

## 2019-08-20 PROCEDURE — 99215 PR OFFICE/OUTPT VISIT, EST, LEVL V, 40-54 MIN: ICD-10-PCS | Mod: S$GLB,,, | Performed by: INTERNAL MEDICINE

## 2019-08-20 PROCEDURE — 3075F PR MOST RECENT SYSTOLIC BLOOD PRESS GE 130-139MM HG: ICD-10-PCS | Mod: CPTII,S$GLB,, | Performed by: INTERNAL MEDICINE

## 2019-08-20 RX ORDER — METHOTREXATE 2.5 MG/1
5 TABLET ORAL
Qty: 24 TABLET | Refills: 0 | Status: SHIPPED | OUTPATIENT
Start: 2019-08-20 | End: 2021-03-12 | Stop reason: CLARIF

## 2019-08-20 RX ORDER — FOLIC ACID 1 MG/1
1 TABLET ORAL DAILY
Qty: 90 TABLET | Refills: 0 | Status: SHIPPED | OUTPATIENT
Start: 2019-08-20 | End: 2019-10-10

## 2019-08-20 RX ORDER — GLUCOSAMINE/CHONDRO SU A 500-400 MG
1 TABLET ORAL DAILY
COMMUNITY

## 2019-08-20 NOTE — PROGRESS NOTES
"Subjective:       Patient ID: Rojelio Dye Sr. is a 74 y.o. male.    Chief Complaint: Joint pain    HPI:  Rojelio Dye Sr. is a 74 y.o. 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       In 9/2013 there was +RF but normal joint scan and inflammatory markers.  Consulted Dr. Olmstead who did surgeries on both feet.  Left foot now with infection.  Right foot has worsened after surgery.   Trouble with wound healing.  In past had to have PICC line for antibiotics.     Saw Dr. Olmstead couple weeks ago and he wants to pin another toe.  Now with infection in toe but antibiotics did not help.       Dr. Elijah [facility]inal [facility]ics on [facility] injects knees with (last 11/30/2018) helped left knee but not right knee.  Hospitalized 12/21/18 to 1/7/19 for cervical cord compression with myelopathy.  Had surgical anterior and posterior C4, 5 and 6 fusion.    Interval History:  Blood pressure elevated after steroid injection in knees.  Now BP slightly improved with medication adjustment.  Abnormal stress test followed by normal angiogram.  September will have doppler of legs.      Still dealing with balance issues as well as pain in ankles, knees, right foot and hands hands that is 3/10 ache.  Nothing improves.  Worsened with use.    No further falls.       Review of Systems   Constitutional: Positive for fatigue and unexpected weight change (weight loss).   HENT: Negative.    Respiratory: Positive for shortness of breath (on exertion).    Endocrine: Negative.    Genitourinary: Negative.    Musculoskeletal: Positive for arthralgias and myalgias.   Allergic/Immunologic: Negative.    Neurological:        Off balance   Hematological: Bruises/bleeds easily.   Psychiatric/Behavioral: Negative.          Objective:   /77 (BP Location: Left arm, Patient Position: Sitting, BP Method: Large (Automatic))   Pulse 64   Ht 5' 7.5" (1.715 m) "   Wt 78 kg (171 lb 15.3 oz)   BMI 26.54 kg/m²      Physical Exam   Constitutional: He is oriented to person, place, and time and well-developed, well-nourished, and in no distress.   HENT:   Head: Normocephalic and atraumatic.   Eyes: Conjunctivae and EOM are normal.   Neck: Neck supple.   Cardiovascular: Normal rate and regular rhythm.    Pulmonary/Chest: Effort normal and breath sounds normal.   Abdominal: Soft. Bowel sounds are normal.   Neurological: He is alert and oriented to person, place, and time.   Slow stiff gait.  Jerkiness of legs involuntarily   Skin: Skin is warm and dry.     Psychiatric: Affect normal.   Musculoskeletal:   28 joint count: 0 swollen and 12 tender (MCPs andknees)  Right 3rd PIP lax joint    Deformity feet  Positive MTP tenderness              Labs  Dr. Grajeda's labs 12/18/18  ESR= 33 (nl 0-15) however labs sat for long time therefore not valid  CRP=0.2  (nl<0.5)  LOUISE negative  Rheumatoid factor=12 (nl<14)  KG=581 ([id number])    Component      Latest Ref Rng & Units 7/23/2019 5/16/2019   WBC      3.90 - 12.70 K/uL 3.98    RBC      4.60 - 6.20 M/uL 4.17 (L)    Hemoglobin      14.0 - 18.0 g/dL 12.5 (L)    Hematocrit      40.0 - 54.0 % 36.4 (L)    MCV      82 - 98 fL 87    MCH      27.0 - 31.0 pg 30.0    MCHC      32.0 - 36.0 g/dL 34.3    RDW      11.5 - 14.5 % 13.1    Platelets      150 - 350 K/uL 113 (L)    MPV      9.2 - 12.9 fL 12.6    Immature Granulocytes      0.0 - 0.5 % 0.8 (H)    Gran # (ANC)      1.8 - 7.7 K/uL 2.1    Immature Grans (Abs)      0.00 - 0.04 K/uL 0.03    Lymph #      1.0 - 4.8 K/uL 1.2    Mono #      0.3 - 1.0 K/uL 0.5    Eos #      0.0 - 0.5 K/uL 0.1    Baso #      0.00 - 0.20 K/uL 0.03    nRBC      0 /100 WBC 0    Gran%      38.0 - 73.0 % 52.1    Lymph%      18.0 - 48.0 % 29.4    Mono%      4.0 - 15.0 % 13.6    Eosinophil%      0.0 - 8.0 % 3.3    Basophil%      0.0 - 1.9 % 0.8    Differential Method       Automated    Sodium      136 - 145 mmol/L 142     Potassium      3.5 - 5.1 mmol/L 4.1    Chloride      95 - 110 mmol/L 106    CO2      22 - 31 mmol/L 27    Glucose      70 - 110 mg/dL 125 (H)    BUN, Bld      9 - 21 mg/dL 43 (H)    Creatinine      0.50 - 1.40 mg/dL 1.81 (H)    Calcium      8.4 - 10.2 mg/dL 9.5    PROTEIN TOTAL      6.0 - 8.4 g/dL 6.3    Albumin      3.5 - 5.2 g/dL 4.0    BILIRUBIN TOTAL      0.2 - 1.3 mg/dL 0.7    Alkaline Phosphatase      38 - 145 U/L 95    AST      17 - 59 U/L 24    ALT      10 - 44 U/L 37    Anion Gap      8 - 16 mmol/L 9    eGFR if African American      >60 mL/min/1.73 m:2 42 (A)    eGFR if non African American      >60 mL/min/1.73 m:2 37 (A)    CRP      0.00 - 0.90 mg/dL  0.60   Sed Rate      0 - 19 mm/Hr  12       Assessment:       1. Psoriatic arthritis.  Prior differential for arthralgias: OA vs. RA vs PsA vs Polyarticular Gout; positive RF but in past but now negative normal ESR and CRP previously; erosions on X-ray right 1st MTP, suspicious for pencil in cup deformity in hands; Will classify as psoriatic arthritis. Now with diffuse pain.  Right middle PIP very mobile  2. History of osteoarthrosis in multiple joints. Erosive OA on x-ray  3. Chondrocalinosis  4. Positive rheumatoid factor in past.  Now negative  5. Diabetes.  Not on any medications.  Tragenta caused glucose to drop  6. Schwanoma.  In past located outside of the lungs  7. Hyperuricemia  8.  Psoriasis diagnosed 2017  9.  Stage 3 renal disease  10. Cervical cord compression with myelopathy.    11.  SOB  12.  Fatigue    Plan:       1.  Methotrexate 5 mg weekly to start.  Folic acid 1 mg daily.  Start once wound on neck healed from insect bite.  2.  Would hold on Plaquenil since it could flare psoriasis  3.  Continue colchicine and allopurinol 100 mg daily.    4.  Labs     RTO 3 months/prn

## 2019-08-21 ENCOUNTER — PATIENT MESSAGE (OUTPATIENT)
Dept: RHEUMATOLOGY | Facility: CLINIC | Age: 74
End: 2019-08-21

## 2019-08-22 ENCOUNTER — PATIENT MESSAGE (OUTPATIENT)
Dept: RHEUMATOLOGY | Facility: CLINIC | Age: 74
End: 2019-08-22

## 2019-08-27 LAB
M TB IFN-G CD4+ BCKGRND COR BLD-ACNC: 0.01 IU/ML
MITOGEN IGNF BCKGRD COR BLD-ACNC: >10 IU/ML
MITOGEN IGNF BCKGRD COR BLD-ACNC: NEGATIVE [IU]/ML
NIL: 0.01 IU/ML
TB2 - NIL: 0.01 IU/ML

## 2019-09-27 ENCOUNTER — PATIENT MESSAGE (OUTPATIENT)
Dept: RHEUMATOLOGY | Facility: CLINIC | Age: 74
End: 2019-09-27

## 2019-10-02 ENCOUNTER — PATIENT MESSAGE (OUTPATIENT)
Dept: RHEUMATOLOGY | Facility: CLINIC | Age: 74
End: 2019-10-02

## 2019-10-02 DIAGNOSIS — M10.9 GOUT, ARTHRITIS: ICD-10-CM

## 2019-10-02 DIAGNOSIS — E79.0 HYPERURICEMIA: ICD-10-CM

## 2019-10-03 ENCOUNTER — HOSPITAL ENCOUNTER (OUTPATIENT)
Dept: RADIOLOGY | Facility: HOSPITAL | Age: 74
Discharge: HOME | End: 2019-10-03
Attending: INTERNAL MEDICINE
Payer: MEDICARE

## 2019-10-03 DIAGNOSIS — N20.0 CALCULUS OF KIDNEY: ICD-10-CM

## 2019-10-03 PROCEDURE — 76770 US EXAM ABDO BACK WALL COMP: CPT | Mod: 26,,, | Performed by: RADIOLOGY

## 2019-10-03 PROCEDURE — 76770 US RETROPERITONEAL COMPLETE: ICD-10-PCS | Mod: 26,,, | Performed by: RADIOLOGY

## 2019-10-03 PROCEDURE — 76770 US EXAM ABDO BACK WALL COMP: CPT | Mod: TC,PO

## 2019-10-03 RX ORDER — ALLOPURINOL 100 MG/1
TABLET ORAL
Qty: 60 TABLET | Refills: 1 | Status: SHIPPED | OUTPATIENT
Start: 2019-10-03 | End: 2019-10-30 | Stop reason: SDUPTHER

## 2019-10-10 ENCOUNTER — OFFICE VISIT (OUTPATIENT)
Dept: NEPHROLOGY | Facility: CLINIC | Age: 74
End: 2019-10-10
Payer: MEDICARE

## 2019-10-10 ENCOUNTER — PATIENT MESSAGE (OUTPATIENT)
Dept: NEPHROLOGY | Facility: CLINIC | Age: 74
End: 2019-10-10

## 2019-10-10 VITALS
OXYGEN SATURATION: 97 % | WEIGHT: 162.69 LBS | DIASTOLIC BLOOD PRESSURE: 78 MMHG | HEIGHT: 68 IN | SYSTOLIC BLOOD PRESSURE: 126 MMHG | BODY MASS INDEX: 24.66 KG/M2 | HEART RATE: 69 BPM

## 2019-10-10 DIAGNOSIS — N20.0 CALCULUS OF KIDNEY: ICD-10-CM

## 2019-10-10 DIAGNOSIS — N18.4 CHRONIC KIDNEY DISEASE, STAGE IV (SEVERE): ICD-10-CM

## 2019-10-10 DIAGNOSIS — N18.30 CHRONIC KIDNEY DISEASE, STAGE III (MODERATE): Primary | ICD-10-CM

## 2019-10-10 DIAGNOSIS — N28.1 ACQUIRED CYST OF KIDNEY: ICD-10-CM

## 2019-10-10 DIAGNOSIS — I10 BENIGN ESSENTIAL HYPERTENSION: ICD-10-CM

## 2019-10-10 PROCEDURE — 99214 OFFICE O/P EST MOD 30 MIN: CPT | Mod: S$GLB,,, | Performed by: INTERNAL MEDICINE

## 2019-10-10 PROCEDURE — 1101F PT FALLS ASSESS-DOCD LE1/YR: CPT | Mod: CPTII,S$GLB,, | Performed by: INTERNAL MEDICINE

## 2019-10-10 PROCEDURE — 3074F PR MOST RECENT SYSTOLIC BLOOD PRESSURE < 130 MM HG: ICD-10-PCS | Mod: CPTII,S$GLB,, | Performed by: INTERNAL MEDICINE

## 2019-10-10 PROCEDURE — 3074F SYST BP LT 130 MM HG: CPT | Mod: CPTII,S$GLB,, | Performed by: INTERNAL MEDICINE

## 2019-10-10 PROCEDURE — 99999 PR PBB SHADOW E&M-EST. PATIENT-LVL III: ICD-10-PCS | Mod: PBBFAC,,, | Performed by: INTERNAL MEDICINE

## 2019-10-10 PROCEDURE — 1101F PR PT FALLS ASSESS DOC 0-1 FALLS W/OUT INJ PAST YR: ICD-10-PCS | Mod: CPTII,S$GLB,, | Performed by: INTERNAL MEDICINE

## 2019-10-10 PROCEDURE — 99999 PR PBB SHADOW E&M-EST. PATIENT-LVL III: CPT | Mod: PBBFAC,,, | Performed by: INTERNAL MEDICINE

## 2019-10-10 PROCEDURE — 3078F PR MOST RECENT DIASTOLIC BLOOD PRESSURE < 80 MM HG: ICD-10-PCS | Mod: CPTII,S$GLB,, | Performed by: INTERNAL MEDICINE

## 2019-10-10 PROCEDURE — 99499 RISK ADDL DX/OHS AUDIT: ICD-10-PCS | Mod: S$GLB,,, | Performed by: INTERNAL MEDICINE

## 2019-10-10 PROCEDURE — 3078F DIAST BP <80 MM HG: CPT | Mod: CPTII,S$GLB,, | Performed by: INTERNAL MEDICINE

## 2019-10-10 PROCEDURE — 99214 PR OFFICE/OUTPT VISIT, EST, LEVL IV, 30-39 MIN: ICD-10-PCS | Mod: S$GLB,,, | Performed by: INTERNAL MEDICINE

## 2019-10-10 PROCEDURE — 99499 UNLISTED E&M SERVICE: CPT | Mod: S$GLB,,, | Performed by: INTERNAL MEDICINE

## 2019-10-10 NOTE — PROGRESS NOTES
"Subjective:       Patient ID: Rojelio Dye Sr. is a 74 y.o. White male who presents for return patient evaluation for chronic renal failure.    He had emergency Neurosurgery on his neck late last year and now has weakness in his hand with numbness.  He has also been waiting to start taking MTX.  HE has been having venous insufficiency in his legs and has been dealing with wounds as well.      Review of Systems   Constitutional: Positive for appetite change and unexpected weight change. Negative for chills and fever.   HENT: Negative for congestion.    Eyes: Negative for visual disturbance.   Respiratory: Negative for cough and shortness of breath.    Cardiovascular: Positive for leg swelling. Negative for chest pain.   Gastrointestinal: Negative for abdominal pain, diarrhea, nausea and vomiting.   Genitourinary: Negative for difficulty urinating, dysuria and hematuria.   Musculoskeletal: Positive for arthralgias (hands and feet), back pain, gait problem and neck pain. Negative for myalgias.   Skin: Positive for wound. Negative for rash.   Neurological: Positive for dizziness, tremors, weakness and numbness. Negative for headaches.   Hematological: Bruises/bleeds easily.   Psychiatric/Behavioral: Negative for sleep disturbance.       The past medical, family and social histories were reviewed for this encounter.     /78 (BP Location: Right arm, Patient Position: Sitting)   Pulse 69   Ht 5' 8" (1.727 m)   Wt 73.8 kg (162 lb 11.2 oz)   SpO2 97%   BMI 24.74 kg/m²     Objective:      Physical Exam   Constitutional: He appears well-developed. No distress.   Ill-appearing   HENT:   Head: Normocephalic and atraumatic.   Eyes: Conjunctivae are normal. No scleral icterus.   Neck: Normal range of motion. No JVD present.   Cardiovascular: Normal rate, regular rhythm and normal heart sounds. Exam reveals no gallop and no friction rub.   No murmur heard.  Pulmonary/Chest: Effort normal and breath sounds normal. No " respiratory distress. He has no wheezes.   Abdominal: Soft. Bowel sounds are normal. He exhibits no distension. There is no tenderness.   Musculoskeletal: He exhibits edema (LLE >> RLE).   Skin: Skin is warm and dry. No rash noted.   Psychiatric: He has a normal mood and affect.   Vitals reviewed.      Assessment:       1. Chronic kidney disease, stage III (moderate)    2. Benign essential hypertension    3. Calculus of kidney    4. Acquired cyst of kidney        Plan:   Return to clinic in 4 months.  Labs for next visit include rp, pth, uric acid.  RP in 2 weeks.  Baseline creatinine is 1.5-2.0 since 2015.  PTH is 51 with a calcium of 9.5.  Renal US shows R 11.0 cm, L 10.9 cm.  I suspect he has had some change in his renal function due to his chronic use of NSAIDS and now hypotension.   Stop naprosyn.  I am ok with MTX as soon as Rheumatology thinks it is reasonable to start this.

## 2019-10-10 NOTE — PROGRESS NOTES
Patient, Rojelio Dye  (MRN #[id number]), presented with a recent Estimated Glumerular Filtration Rate (EGFR) between 15 and 29 consistent with the definition of chronic kidney disease stage 4 (ICD10 - N18.4).    eGFR if non    Date Value Ref Range Status   10/03/2019 28.9 (A) >60 mL/min/1.73 m^2 Final     Comment:     Calculation used to obtain the estimated glomerular filtration  rate (eGFR) is the CKD-EPI equation.          The patient's chronic kidney disease stage 4 was monitored, evaluated, addressed and/or treated. This addendum to the medical record is made on 10/10/2019.

## 2019-10-30 ENCOUNTER — LAB VISIT (OUTPATIENT)
Dept: LAB | Facility: HOSPITAL | Age: 74
End: 2019-10-30
Attending: INTERNAL MEDICINE
Payer: MEDICARE

## 2019-10-30 DIAGNOSIS — E79.0 HYPERURICEMIA: ICD-10-CM

## 2019-10-30 DIAGNOSIS — N18.30 CHRONIC KIDNEY DISEASE, STAGE III (MODERATE): ICD-10-CM

## 2019-10-30 DIAGNOSIS — M10.9 GOUT, ARTHRITIS: ICD-10-CM

## 2019-10-30 LAB
ALBUMIN SERPL BCP-MCNC: 3.8 G/DL (ref 3.5–5.2)
ANION GAP SERPL CALC-SCNC: 8 MMOL/L (ref 8–16)
BUN SERPL-MCNC: 30 MG/DL (ref 8–23)
CALCIUM SERPL-MCNC: 9.7 MG/DL (ref 8.7–10.5)
CHLORIDE SERPL-SCNC: 102 MMOL/L (ref 95–110)
CO2 SERPL-SCNC: 29 MMOL/L (ref 23–29)
CREAT SERPL-MCNC: 1.7 MG/DL (ref 0.5–1.4)
EST. GFR  (AFRICAN AMERICAN): 45.6 ML/MIN/1.73 M^2
EST. GFR  (NON AFRICAN AMERICAN): 39.4 ML/MIN/1.73 M^2
GLUCOSE SERPL-MCNC: 126 MG/DL (ref 70–110)
PHOSPHATE SERPL-MCNC: 3.4 MG/DL (ref 2.7–4.5)
POTASSIUM SERPL-SCNC: 3.5 MMOL/L (ref 3.5–5.1)
SODIUM SERPL-SCNC: 139 MMOL/L (ref 136–145)

## 2019-10-30 PROCEDURE — 36415 COLL VENOUS BLD VENIPUNCTURE: CPT | Mod: PO

## 2019-10-30 PROCEDURE — 80069 RENAL FUNCTION PANEL: CPT

## 2019-10-31 RX ORDER — ALLOPURINOL 100 MG/1
TABLET ORAL
Qty: 90 TABLET | Refills: 1 | Status: SHIPPED | OUTPATIENT
Start: 2019-10-31 | End: 2020-03-01

## 2019-11-06 DIAGNOSIS — M79.641 PAIN IN BOTH HANDS: ICD-10-CM

## 2019-11-06 DIAGNOSIS — M11.20 PSEUDOGOUT: ICD-10-CM

## 2019-11-06 DIAGNOSIS — M79.642 PAIN IN BOTH HANDS: ICD-10-CM

## 2019-11-07 RX ORDER — COLCHICINE 0.6 MG/1
TABLET, FILM COATED ORAL
Qty: 60 TABLET | Refills: 1 | Status: SHIPPED | OUTPATIENT
Start: 2019-11-07 | End: 2020-03-10

## 2019-11-17 ENCOUNTER — PATIENT MESSAGE (OUTPATIENT)
Dept: RHEUMATOLOGY | Facility: CLINIC | Age: 74
End: 2019-11-17

## 2019-11-19 ENCOUNTER — PATIENT MESSAGE (OUTPATIENT)
Dept: RHEUMATOLOGY | Facility: CLINIC | Age: 74
End: 2019-11-19

## 2019-12-06 RX ORDER — GABAPENTIN 300 MG/1
300 CAPSULE ORAL 3 TIMES DAILY
Qty: 90 CAPSULE | Refills: 2 | Status: SHIPPED | OUTPATIENT
Start: 2019-12-06 | End: 2021-08-16 | Stop reason: SDUPTHER

## 2019-12-06 NOTE — TELEPHONE ENCOUNTER
----- Message from Lisbeth García PA-C sent at 12/6/2019 11:09 AM CST -----  Please let patient know that I am sending requested refill of gabapentin to his pharmacy. If he requires further refills in the future please let him know to obtain from PCP if possible since he has been released from our care post-op.     Thanks!

## 2019-12-19 ENCOUNTER — PATIENT MESSAGE (OUTPATIENT)
Dept: RHEUMATOLOGY | Facility: CLINIC | Age: 74
End: 2019-12-19

## 2019-12-30 ENCOUNTER — PATIENT MESSAGE (OUTPATIENT)
Dept: RHEUMATOLOGY | Facility: CLINIC | Age: 74
End: 2019-12-30

## 2019-12-31 ENCOUNTER — PATIENT MESSAGE (OUTPATIENT)
Dept: NEPHROLOGY | Facility: CLINIC | Age: 74
End: 2019-12-31

## 2020-01-29 ENCOUNTER — OFFICE VISIT (OUTPATIENT)
Dept: RHEUMATOLOGY | Facility: CLINIC | Age: 75
End: 2020-01-29
Payer: MEDICARE

## 2020-01-29 ENCOUNTER — LAB VISIT (OUTPATIENT)
Dept: LAB | Facility: HOSPITAL | Age: 75
End: 2020-01-29
Attending: INTERNAL MEDICINE
Payer: MEDICARE

## 2020-01-29 VITALS
WEIGHT: 164 LBS | DIASTOLIC BLOOD PRESSURE: 86 MMHG | SYSTOLIC BLOOD PRESSURE: 147 MMHG | BODY MASS INDEX: 24.94 KG/M2 | HEART RATE: 62 BPM

## 2020-01-29 DIAGNOSIS — Z87.81 HISTORY OF FRACTURED RIB: ICD-10-CM

## 2020-01-29 DIAGNOSIS — D36.10 SCHWANNOMA: ICD-10-CM

## 2020-01-29 DIAGNOSIS — L40.50 PSORIATIC ARTHRITIS: Primary | ICD-10-CM

## 2020-01-29 DIAGNOSIS — Z87.81 HISTORY OF HAND FRACTURE: ICD-10-CM

## 2020-01-29 DIAGNOSIS — R53.83 FATIGUE, UNSPECIFIED TYPE: ICD-10-CM

## 2020-01-29 DIAGNOSIS — D84.9 IMMUNOSUPPRESSION: ICD-10-CM

## 2020-01-29 DIAGNOSIS — Z82.62 FAMILY HISTORY OF OSTEOPOROSIS: ICD-10-CM

## 2020-01-29 DIAGNOSIS — L40.50 PSORIATIC ARTHRITIS: ICD-10-CM

## 2020-01-29 DIAGNOSIS — G95.20 CERVICAL CORD COMPRESSION WITH MYELOPATHY: ICD-10-CM

## 2020-01-29 DIAGNOSIS — E11.69 TYPE 2 DIABETES MELLITUS WITH OTHER SPECIFIED COMPLICATION, WITHOUT LONG-TERM CURRENT USE OF INSULIN: ICD-10-CM

## 2020-01-29 DIAGNOSIS — N18.2 CKD (CHRONIC KIDNEY DISEASE), STAGE II: ICD-10-CM

## 2020-01-29 DIAGNOSIS — M11.20 PSEUDOGOUT: ICD-10-CM

## 2020-01-29 LAB
ALBUMIN SERPL BCP-MCNC: 3.9 G/DL (ref 3.5–5.2)
ALP SERPL-CCNC: 123 U/L (ref 55–135)
ALT SERPL W/O P-5'-P-CCNC: 31 U/L (ref 10–44)
ANION GAP SERPL CALC-SCNC: 8 MMOL/L (ref 8–16)
AST SERPL-CCNC: 25 U/L (ref 10–40)
BASOPHILS # BLD AUTO: 0.04 K/UL (ref 0–0.2)
BASOPHILS NFR BLD: 0.8 % (ref 0–1.9)
BILIRUB SERPL-MCNC: 0.5 MG/DL (ref 0.1–1)
BUN SERPL-MCNC: 34 MG/DL (ref 8–23)
CALCIUM SERPL-MCNC: 9.6 MG/DL (ref 8.7–10.5)
CHLORIDE SERPL-SCNC: 107 MMOL/L (ref 95–110)
CO2 SERPL-SCNC: 28 MMOL/L (ref 23–29)
CREAT SERPL-MCNC: 1.8 MG/DL (ref 0.5–1.4)
CRP SERPL-MCNC: 3.9 MG/L (ref 0–8.2)
DIFFERENTIAL METHOD: ABNORMAL
EOSINOPHIL # BLD AUTO: 0.1 K/UL (ref 0–0.5)
EOSINOPHIL NFR BLD: 1.9 % (ref 0–8)
ERYTHROCYTE [DISTWIDTH] IN BLOOD BY AUTOMATED COUNT: 13.1 % (ref 11.5–14.5)
ERYTHROCYTE [SEDIMENTATION RATE] IN BLOOD BY WESTERGREN METHOD: 17 MM/HR (ref 0–23)
EST. GFR  (AFRICAN AMERICAN): 42.5 ML/MIN/1.73 M^2
EST. GFR  (NON AFRICAN AMERICAN): 36.8 ML/MIN/1.73 M^2
GLUCOSE SERPL-MCNC: 150 MG/DL (ref 70–110)
HCT VFR BLD AUTO: 39.3 % (ref 40–54)
HGB BLD-MCNC: 13 G/DL (ref 14–18)
IMM GRANULOCYTES # BLD AUTO: 0.03 K/UL (ref 0–0.04)
IMM GRANULOCYTES NFR BLD AUTO: 0.6 % (ref 0–0.5)
LYMPHOCYTES # BLD AUTO: 1.1 K/UL (ref 1–4.8)
LYMPHOCYTES NFR BLD: 23.7 % (ref 18–48)
MCH RBC QN AUTO: 29.4 PG (ref 27–31)
MCHC RBC AUTO-ENTMCNC: 33.1 G/DL (ref 32–36)
MCV RBC AUTO: 89 FL (ref 82–98)
MONOCYTES # BLD AUTO: 0.7 K/UL (ref 0.3–1)
MONOCYTES NFR BLD: 14.7 % (ref 4–15)
NEUTROPHILS # BLD AUTO: 2.8 K/UL (ref 1.8–7.7)
NEUTROPHILS NFR BLD: 58.3 % (ref 38–73)
NRBC BLD-RTO: 0 /100 WBC
PLATELET # BLD AUTO: 128 K/UL (ref 150–350)
PMV BLD AUTO: 12 FL (ref 9.2–12.9)
POTASSIUM SERPL-SCNC: 3.9 MMOL/L (ref 3.5–5.1)
PROT SERPL-MCNC: 7 G/DL (ref 6–8.4)
RBC # BLD AUTO: 4.42 M/UL (ref 4.6–6.2)
SODIUM SERPL-SCNC: 143 MMOL/L (ref 136–145)
WBC # BLD AUTO: 4.82 K/UL (ref 3.9–12.7)

## 2020-01-29 PROCEDURE — 86140 C-REACTIVE PROTEIN: CPT

## 2020-01-29 PROCEDURE — 99999 PR PBB SHADOW E&M-EST. PATIENT-LVL III: CPT | Mod: PBBFAC,,, | Performed by: INTERNAL MEDICINE

## 2020-01-29 PROCEDURE — 3077F PR MOST RECENT SYSTOLIC BLOOD PRESSURE >= 140 MM HG: ICD-10-PCS | Mod: CPTII,S$GLB,, | Performed by: INTERNAL MEDICINE

## 2020-01-29 PROCEDURE — 36415 COLL VENOUS BLD VENIPUNCTURE: CPT

## 2020-01-29 PROCEDURE — 99214 PR OFFICE/OUTPT VISIT, EST, LEVL IV, 30-39 MIN: ICD-10-PCS | Mod: S$GLB,,, | Performed by: INTERNAL MEDICINE

## 2020-01-29 PROCEDURE — 1159F PR MEDICATION LIST DOCUMENTED IN MEDICAL RECORD: ICD-10-PCS | Mod: S$GLB,,, | Performed by: INTERNAL MEDICINE

## 2020-01-29 PROCEDURE — 1125F AMNT PAIN NOTED PAIN PRSNT: CPT | Mod: S$GLB,,, | Performed by: INTERNAL MEDICINE

## 2020-01-29 PROCEDURE — 99999 PR PBB SHADOW E&M-EST. PATIENT-LVL III: ICD-10-PCS | Mod: PBBFAC,,, | Performed by: INTERNAL MEDICINE

## 2020-01-29 PROCEDURE — 1100F PR PT FALLS ASSESS DOC 2+ FALLS/FALL W/INJURY/YR: ICD-10-PCS | Mod: CPTII,S$GLB,, | Performed by: INTERNAL MEDICINE

## 2020-01-29 PROCEDURE — 85652 RBC SED RATE AUTOMATED: CPT

## 2020-01-29 PROCEDURE — 80053 COMPREHEN METABOLIC PANEL: CPT

## 2020-01-29 PROCEDURE — 1100F PTFALLS ASSESS-DOCD GE2>/YR: CPT | Mod: CPTII,S$GLB,, | Performed by: INTERNAL MEDICINE

## 2020-01-29 PROCEDURE — 3079F PR MOST RECENT DIASTOLIC BLOOD PRESSURE 80-89 MM HG: ICD-10-PCS | Mod: CPTII,S$GLB,, | Performed by: INTERNAL MEDICINE

## 2020-01-29 PROCEDURE — 1125F PR PAIN SEVERITY QUANTIFIED, PAIN PRESENT: ICD-10-PCS | Mod: S$GLB,,, | Performed by: INTERNAL MEDICINE

## 2020-01-29 PROCEDURE — 3044F PR MOST RECENT HEMOGLOBIN A1C LEVEL <7.0%: ICD-10-PCS | Mod: CPTII,S$GLB,, | Performed by: INTERNAL MEDICINE

## 2020-01-29 PROCEDURE — 1159F MED LIST DOCD IN RCRD: CPT | Mod: S$GLB,,, | Performed by: INTERNAL MEDICINE

## 2020-01-29 PROCEDURE — 3044F HG A1C LEVEL LT 7.0%: CPT | Mod: CPTII,S$GLB,, | Performed by: INTERNAL MEDICINE

## 2020-01-29 PROCEDURE — 99214 OFFICE O/P EST MOD 30 MIN: CPT | Mod: S$GLB,,, | Performed by: INTERNAL MEDICINE

## 2020-01-29 PROCEDURE — 3288F FALL RISK ASSESSMENT DOCD: CPT | Mod: CPTII,S$GLB,, | Performed by: INTERNAL MEDICINE

## 2020-01-29 PROCEDURE — 85025 COMPLETE CBC W/AUTO DIFF WBC: CPT

## 2020-01-29 PROCEDURE — 3077F SYST BP >= 140 MM HG: CPT | Mod: CPTII,S$GLB,, | Performed by: INTERNAL MEDICINE

## 2020-01-29 PROCEDURE — 3288F PR FALLS RISK ASSESSMENT DOCUMENTED: ICD-10-PCS | Mod: CPTII,S$GLB,, | Performed by: INTERNAL MEDICINE

## 2020-01-29 PROCEDURE — 3079F DIAST BP 80-89 MM HG: CPT | Mod: CPTII,S$GLB,, | Performed by: INTERNAL MEDICINE

## 2020-01-29 RX ORDER — HYDROCODONE BITARTRATE AND ACETAMINOPHEN 10; 325 MG/1; MG/1
1 TABLET ORAL EVERY 8 HOURS PRN
COMMUNITY
End: 2022-01-21 | Stop reason: SDUPTHER

## 2020-01-29 NOTE — PROGRESS NOTES
"Subjective:       Patient ID: Rojelio Dye Sr. is a 74 y.o. male.    Chief Complaint: Joint pain    HPI:  Rojelio Dye Sr. is a 74 y.o. 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       In 9/2013 there was +RF but normal joint scan and inflammatory markers.  Consulted Dr. Olmstead who did surgeries on both feet.  Left foot now with infection.  Right foot has worsened after surgery.   Trouble with wound healing.  In past had to have PICC line for antibiotics.     Saw Dr. Olmstead couple weeks ago and he wants to pin another toe.  Now with infection in toe but antibiotics did not help.       Dr. Elijah Espinal orthopedics on [facility] injects knees with (last 11/30/2018) helped left knee but not right knee.  Hospitalized 12/21/18 to 1/7/19 for cervical cord compression with myelopathy.  Had surgical anterior and posterior C4, 5 and 6 fusion.    Interval History:    Vein ablation 11/1/2019 of left leg due to sores on legs.  Sores have healed.   Will have right leg evaluated to see if ablation needed.   Had cardiac cath told he has 38% left carotic blockage and nothing needs to be done.     Nephrology and primary are fine with us starting methotrexate.  Pain in right ankle, knees, right foot and hands that is 5/10 ache.  Nothing improves.  Worsened with use.    One fall mid January 2020 while getting out of his truck.  His knee "kicked" out and led to fall.     Neurosurgeon has completely released him and was ok with us start MTX.         Review of Systems   Constitutional: Positive for fatigue and unexpected weight change (weight loss).   HENT: Negative.    Respiratory: Positive for shortness of breath (on exertion).    Endocrine: Negative.    Genitourinary: Negative.    Musculoskeletal: Positive for arthralgias and myalgias.   Allergic/Immunologic: Negative.    Neurological:        Off balance   Hematological: " Bruises/bleeds easily.   Psychiatric/Behavioral: Negative.          Objective:   BP (!) 147/86 (BP Location: Left arm, Patient Position: Sitting, BP Method: Medium (Automatic))   Pulse 62   Wt 74.4 kg (164 lb)   BMI 24.94 kg/m²      Physical Exam   Constitutional: He is oriented to person, place, and time and well-developed, well-nourished, and in no distress.   HENT:   Head: Normocephalic and atraumatic.   Eyes: Conjunctivae and EOM are normal.   Neck: Neck supple.   Cardiovascular: Normal rate and regular rhythm.    Pulmonary/Chest: Effort normal and breath sounds normal.   Abdominal: Soft. Bowel sounds are normal.   Neurological: He is alert and oriented to person, place, and time.   Slow stiff gait.  Jerkiness of legs involuntarily   Skin: Skin is warm and dry.     Psychiatric: Affect normal.   Musculoskeletal:   28 joint count: 0 swollen and 12 tender (MCPs andknees)  Right 3rd PIP lax joint    Deformity feet  Positive MTP tenderness              Labs  Dr. Grajeda's labs 12/18/18  ESR= 33 (nl 0-15) however labs sat for long time therefore not valid  CRP=0.2  (nl<0.5)  LOUISE negative  Rheumatoid factor=12 (nl<14)  ZS=875 ([id number])    Component      Latest Ref Rng & Units 10/3/2019 9/27/2019   WBC      3.90 - 12.70 K/uL  7.44   RBC      4.60 - 6.20 M/uL  4.47 (L)   Hemoglobin      14.0 - 18.0 g/dL  13.3 (L)   Hematocrit      40.0 - 54.0 %  38.5 (L)   MCV      82 - 98 fL  86   MCH      27.0 - 31.0 pg  29.8   MCHC      32.0 - 36.0 g/dL  34.5   RDW      11.5 - 14.5 %  12.6   Platelets      150 - 350 K/uL  124 (L)   MPV      9.2 - 12.9 fL  12.9   Immature Granulocytes      0.0 - 0.5 %  1.5 (H)   Gran # (ANC)      1.8 - 7.7 K/uL  5.1   Immature Grans (Abs)      0.00 - 0.04 K/uL  0.11 (H)   Lymph #      1.0 - 4.8 K/uL  1.4   Mono #      0.3 - 1.0 K/uL  0.7   Eos #      0.0 - 0.5 K/uL  0.1   Baso #      0.00 - 0.20 K/uL  0.03   nRBC      0 /100 WBC  0   Gran%      38.0 - 73.0 %  68.4   Lymph%      18.0 - 48.0 %  18.1    Mono%      4.0 - 15.0 %  9.7   Eosinophil%      0.0 - 8.0 %  1.9   Basophil%      0.0 - 1.9 %  0.4   Differential Method        Automated   Sodium      136 - 145 mmol/L 138 140   Potassium      3.5 - 5.1 mmol/L 4.4 4.3   Chloride      95 - 110 mmol/L 102 105   CO2      23 - 29 mmol/L 27 25   Glucose      70 - 110 mg/dL 165 (H) 129 (H)   BUN, Bld      8 - 23 mg/dL 45 (H) 41 (H)   Creatinine      0.5 - 1.4 mg/dL 2.2 (H) 1.67 (H)   Calcium      8.7 - 10.5 mg/dL 9.8 9.1   PROTEIN TOTAL      6.0 - 8.4 g/dL  6.4   Albumin      3.5 - 5.2 g/dL 4.2 3.8   BILIRUBIN TOTAL      0.2 - 1.3 mg/dL  0.8   Alkaline Phosphatase      38 - 145 U/L  113   AST      17 - 59 U/L  20   ALT      10 - 44 U/L  31   Anion Gap      8 - 16 mmol/L 9 10   eGFR if African American      >60 mL/min/1.73 m:2 33.4 (A) 47 (A)   eGFR if non African American      >60 mL/min/1.73 m:2 28.9 (A) 40 (A)   Phosphorus      2.7 - 4.5 mg/dL 4.1    Cholesterol      120 - 199 mg/dL  107 (L)   Triglycerides      30 - 150 mg/dL  91   HDL      40 - 75 mg/dL  39 (L)   LDL Cholesterol External      63.0 - 159.0 mg/dL  49.8 (L)   Hdl/Cholesterol Ratio      20.0 - 50.0 %  36.4   Total Cholesterol/HDL Ratio      2.0 - 5.0  2.7   Non-HDL Cholesterol      mg/dL  68   Microalbum.,U,Random      ug/mL  253.2   Creatinine, Random Ur      23.0 - 375.0 mg/dL  80.2   MICROALB/CREAT RATIO      0.0 - 30.0 ug/mg  315.7 (H)   Hemoglobin A1C External      0.0 - 5.6 %  6.7 (H)   Estimated Avg Glucose      68 - 131 mg/dL  146 (H)   TSH      0.400 - 4.000 uIU/mL  2.700   PTH      9.0 - 77.0 pg/mL 70.0    Uric Acid      3.4 - 7.0 mg/dL 6.8        Assessment:       1. Psoriatic arthritis.  Prior differential for arthralgias: OA vs. RA vs PsA vs Polyarticular Gout; positive RF but in past but now negative normal ESR and CRP previously; erosions on X-ray right 1st MTP, suspicious for pencil in cup deformity in hands; Will classify as psoriatic arthritis. Still with diffuse pain.  Right middle  PIP very mobile  2. History of osteoarthrosis in multiple joints. Erosive OA on x-ray  3. Chondrocalinosis  4. Positive rheumatoid factor in past.  Now negative  5. Diabetes.  Not on any medications.  Tragenta caused glucose to drop  6. Schwanoma.  In past located outside of the lungs  7. Hyperuricemia  8.  Psoriasis diagnosed 2017  9.  Stage 3 renal disease  10. Cervical cord compression with myelopathy.    11.  SOB  12.  Fatigue    Plan:       1.  Start methotrexate 5 mg weekly to start.  Folic acid 1 mg daily.  2.  Would hold on Plaquenil since it could flare psoriasis  3.  Continue colchicine and allopurinol 100 mg daily.    4.  Labs  5.  DEXA     RTO 3 months/prn

## 2020-01-30 ENCOUNTER — PATIENT MESSAGE (OUTPATIENT)
Dept: RHEUMATOLOGY | Facility: CLINIC | Age: 75
End: 2020-01-30

## 2020-02-06 ENCOUNTER — LAB VISIT (OUTPATIENT)
Dept: LAB | Facility: HOSPITAL | Age: 75
End: 2020-02-06
Attending: INTERNAL MEDICINE
Payer: MEDICARE

## 2020-02-06 DIAGNOSIS — N18.30 CHRONIC KIDNEY DISEASE, STAGE III (MODERATE): ICD-10-CM

## 2020-02-06 LAB
ALBUMIN SERPL BCP-MCNC: 4 G/DL (ref 3.5–5.2)
ANION GAP SERPL CALC-SCNC: 11 MMOL/L (ref 8–16)
BUN SERPL-MCNC: 35 MG/DL (ref 8–23)
CALCIUM SERPL-MCNC: 10.1 MG/DL (ref 8.7–10.5)
CHLORIDE SERPL-SCNC: 104 MMOL/L (ref 95–110)
CO2 SERPL-SCNC: 29 MMOL/L (ref 23–29)
CREAT SERPL-MCNC: 1.9 MG/DL (ref 0.5–1.4)
EST. GFR  (AFRICAN AMERICAN): 39.8 ML/MIN/1.73 M^2
EST. GFR  (NON AFRICAN AMERICAN): 34.5 ML/MIN/1.73 M^2
GLUCOSE SERPL-MCNC: 86 MG/DL (ref 70–110)
PHOSPHATE SERPL-MCNC: 4.5 MG/DL (ref 2.7–4.5)
POTASSIUM SERPL-SCNC: 4.1 MMOL/L (ref 3.5–5.1)
PTH-INTACT SERPL-MCNC: 68 PG/ML (ref 9–77)
SODIUM SERPL-SCNC: 144 MMOL/L (ref 136–145)
URATE SERPL-MCNC: 7.1 MG/DL (ref 3.4–7)

## 2020-02-06 PROCEDURE — 83970 ASSAY OF PARATHORMONE: CPT

## 2020-02-06 PROCEDURE — 36415 COLL VENOUS BLD VENIPUNCTURE: CPT | Mod: PO

## 2020-02-06 PROCEDURE — 80069 RENAL FUNCTION PANEL: CPT

## 2020-02-06 PROCEDURE — 84550 ASSAY OF BLOOD/URIC ACID: CPT

## 2020-02-18 ENCOUNTER — OFFICE VISIT (OUTPATIENT)
Dept: NEPHROLOGY | Facility: CLINIC | Age: 75
End: 2020-02-18
Payer: MEDICARE

## 2020-02-18 VITALS
BODY MASS INDEX: 24.19 KG/M2 | WEIGHT: 159.63 LBS | HEIGHT: 68 IN | SYSTOLIC BLOOD PRESSURE: 112 MMHG | DIASTOLIC BLOOD PRESSURE: 74 MMHG | HEART RATE: 62 BPM | OXYGEN SATURATION: 98 %

## 2020-02-18 DIAGNOSIS — N28.1 ACQUIRED CYST OF KIDNEY: ICD-10-CM

## 2020-02-18 DIAGNOSIS — I10 BENIGN ESSENTIAL HYPERTENSION: ICD-10-CM

## 2020-02-18 DIAGNOSIS — N20.0 CALCULUS OF KIDNEY: ICD-10-CM

## 2020-02-18 DIAGNOSIS — N18.30 CHRONIC KIDNEY DISEASE, STAGE III (MODERATE): Primary | ICD-10-CM

## 2020-02-18 PROCEDURE — 3074F SYST BP LT 130 MM HG: CPT | Mod: CPTII,S$GLB,, | Performed by: INTERNAL MEDICINE

## 2020-02-18 PROCEDURE — 1159F MED LIST DOCD IN RCRD: CPT | Mod: S$GLB,,, | Performed by: INTERNAL MEDICINE

## 2020-02-18 PROCEDURE — 99999 PR PBB SHADOW E&M-EST. PATIENT-LVL III: CPT | Mod: PBBFAC,,, | Performed by: INTERNAL MEDICINE

## 2020-02-18 PROCEDURE — 3078F DIAST BP <80 MM HG: CPT | Mod: CPTII,S$GLB,, | Performed by: INTERNAL MEDICINE

## 2020-02-18 PROCEDURE — 1101F PT FALLS ASSESS-DOCD LE1/YR: CPT | Mod: CPTII,S$GLB,, | Performed by: INTERNAL MEDICINE

## 2020-02-18 PROCEDURE — 3074F PR MOST RECENT SYSTOLIC BLOOD PRESSURE < 130 MM HG: ICD-10-PCS | Mod: CPTII,S$GLB,, | Performed by: INTERNAL MEDICINE

## 2020-02-18 PROCEDURE — 99214 PR OFFICE/OUTPT VISIT, EST, LEVL IV, 30-39 MIN: ICD-10-PCS | Mod: S$GLB,,, | Performed by: INTERNAL MEDICINE

## 2020-02-18 PROCEDURE — 1159F PR MEDICATION LIST DOCUMENTED IN MEDICAL RECORD: ICD-10-PCS | Mod: S$GLB,,, | Performed by: INTERNAL MEDICINE

## 2020-02-18 PROCEDURE — 99214 OFFICE O/P EST MOD 30 MIN: CPT | Mod: S$GLB,,, | Performed by: INTERNAL MEDICINE

## 2020-02-18 PROCEDURE — 1101F PR PT FALLS ASSESS DOC 0-1 FALLS W/OUT INJ PAST YR: ICD-10-PCS | Mod: CPTII,S$GLB,, | Performed by: INTERNAL MEDICINE

## 2020-02-18 PROCEDURE — 99999 PR PBB SHADOW E&M-EST. PATIENT-LVL III: ICD-10-PCS | Mod: PBBFAC,,, | Performed by: INTERNAL MEDICINE

## 2020-02-18 PROCEDURE — 3078F PR MOST RECENT DIASTOLIC BLOOD PRESSURE < 80 MM HG: ICD-10-PCS | Mod: CPTII,S$GLB,, | Performed by: INTERNAL MEDICINE

## 2020-02-18 RX ORDER — HYDROCHLOROTHIAZIDE 25 MG/1
25 TABLET ORAL DAILY
COMMUNITY
Start: 2020-02-17 | End: 2021-07-09

## 2020-02-18 RX ORDER — ROPINIROLE 3 MG/1
3 TABLET, FILM COATED ORAL NIGHTLY
COMMUNITY
Start: 2020-01-27 | End: 2021-09-06

## 2020-02-18 RX ORDER — AMLODIPINE BESYLATE 10 MG/1
5 TABLET ORAL 2 TIMES DAILY
COMMUNITY
Start: 2020-02-17 | End: 2022-01-21

## 2020-02-18 RX ORDER — LINAGLIPTIN 5 MG/1
5 TABLET, FILM COATED ORAL NIGHTLY
COMMUNITY
Start: 2020-01-13 | End: 2022-01-26 | Stop reason: CLARIF

## 2020-02-18 RX ORDER — LEVOCETIRIZINE DIHYDROCHLORIDE 5 MG/1
TABLET, FILM COATED ORAL
COMMUNITY
Start: 2019-12-04 | End: 2021-03-12 | Stop reason: CLARIF

## 2020-02-18 RX ORDER — FOLIC ACID 1 MG/1
1 TABLET ORAL DAILY
COMMUNITY
End: 2021-03-23 | Stop reason: SDUPTHER

## 2020-02-18 RX ORDER — NAPROXEN SODIUM 220 MG/1
81 TABLET, FILM COATED ORAL NIGHTLY
COMMUNITY
Start: 2022-02-20 | End: 2023-02-16 | Stop reason: SDUPTHER

## 2020-02-18 NOTE — PROGRESS NOTES
"Subjective:       Patient ID: Rojelio Dye Sr. is a 74 y.o. White male who presents for return patient evaluation for chronic renal failure.    He had emergency Neurosurgery on his neck late last year and now has weakness in his hand with numbness.  He started taking MTX but since he started this he has had urgency and frequency but no weak stream and no infection.        Review of Systems   Constitutional: Positive for appetite change and unexpected weight change. Negative for chills and fever.   HENT: Negative for congestion.    Eyes: Negative for visual disturbance.   Respiratory: Negative for cough and shortness of breath.    Cardiovascular: Negative for chest pain and leg swelling.   Gastrointestinal: Positive for nausea. Negative for abdominal pain, diarrhea and vomiting.   Genitourinary: Positive for urgency. Negative for difficulty urinating, dysuria and hematuria.   Musculoskeletal: Positive for arthralgias (hands and feet), back pain, gait problem and neck pain. Negative for myalgias.   Skin: Positive for wound. Negative for rash.   Neurological: Positive for dizziness, tremors, weakness and numbness (feet). Negative for headaches.   Hematological: Bruises/bleeds easily.   Psychiatric/Behavioral: Negative for sleep disturbance.       The past medical, family and social histories were reviewed for this encounter.     /74   Pulse 62   Ht 5' 8" (1.727 m)   Wt 72.4 kg (159 lb 9.8 oz)   SpO2 98%   BMI 24.27 kg/m²     Objective:      Physical Exam   Constitutional: He appears well-developed. No distress.   Ill-appearing   HENT:   Head: Normocephalic and atraumatic.   Eyes: Conjunctivae are normal. No scleral icterus.   Neck: Normal range of motion. No JVD present.   Cardiovascular: Normal rate, regular rhythm and normal heart sounds. Exam reveals no gallop and no friction rub.   No murmur heard.  Pulmonary/Chest: Effort normal and breath sounds normal. No respiratory distress. He has no wheezes. "   Abdominal: Soft. Bowel sounds are normal. He exhibits no distension. There is no tenderness.   Musculoskeletal: He exhibits edema (trace BLE).   Skin: Skin is warm and dry. No rash noted.   Psychiatric: He has a normal mood and affect.   Vitals reviewed.      Assessment:       1. Chronic kidney disease, stage III (moderate)    2. Benign essential hypertension    3. Calculus of kidney    4. Acquired cyst of kidney        Plan:   Return to clinic in 6 months.  Labs for next visit include rp, pth.   Baseline creatinine is 1.5-2.0 since 2015.  PTH is 51 with a calcium of 9.5.  Renal US shows R 11.0 cm, L 10.9 cm.  I suspect he has had some change in his renal function due to his chronic use of NSAIDS and now hypotension.

## 2020-03-01 DIAGNOSIS — M10.9 GOUT, ARTHRITIS: ICD-10-CM

## 2020-03-01 DIAGNOSIS — E79.0 HYPERURICEMIA: ICD-10-CM

## 2020-03-01 RX ORDER — ALLOPURINOL 100 MG/1
TABLET ORAL
Qty: 90 TABLET | Refills: 1 | Status: SHIPPED | OUTPATIENT
Start: 2020-03-01 | End: 2020-12-03

## 2020-03-10 DIAGNOSIS — M11.20 PSEUDOGOUT: ICD-10-CM

## 2020-03-10 DIAGNOSIS — M79.641 PAIN IN BOTH HANDS: ICD-10-CM

## 2020-03-10 DIAGNOSIS — M79.642 PAIN IN BOTH HANDS: ICD-10-CM

## 2020-03-10 RX ORDER — COLCHICINE 0.6 MG/1
TABLET, FILM COATED ORAL
Qty: 60 TABLET | Refills: 1 | Status: SHIPPED | OUTPATIENT
Start: 2020-03-10 | End: 2020-05-15

## 2020-04-14 ENCOUNTER — TELEPHONE (OUTPATIENT)
Dept: OPTOMETRY | Facility: CLINIC | Age: 75
End: 2020-04-14

## 2020-04-16 ENCOUNTER — PATIENT MESSAGE (OUTPATIENT)
Dept: RHEUMATOLOGY | Facility: CLINIC | Age: 75
End: 2020-04-16

## 2020-04-22 ENCOUNTER — OFFICE VISIT (OUTPATIENT)
Dept: RHEUMATOLOGY | Facility: CLINIC | Age: 75
End: 2020-04-22
Payer: MEDICARE

## 2020-04-22 DIAGNOSIS — R76.8 RHEUMATOID FACTOR POSITIVE: ICD-10-CM

## 2020-04-22 DIAGNOSIS — M11.20 PSEUDOGOUT: ICD-10-CM

## 2020-04-22 DIAGNOSIS — R26.9 GAIT DISTURBANCE: ICD-10-CM

## 2020-04-22 DIAGNOSIS — L40.50 PSORIATIC ARTHRITIS: ICD-10-CM

## 2020-04-22 DIAGNOSIS — L40.9 PSORIASIS: Primary | ICD-10-CM

## 2020-04-22 DIAGNOSIS — R53.83 FATIGUE, UNSPECIFIED TYPE: ICD-10-CM

## 2020-04-22 PROCEDURE — 1159F PR MEDICATION LIST DOCUMENTED IN MEDICAL RECORD: ICD-10-PCS | Mod: 95,,, | Performed by: INTERNAL MEDICINE

## 2020-04-22 PROCEDURE — 1101F PT FALLS ASSESS-DOCD LE1/YR: CPT | Mod: CPTII,95,, | Performed by: INTERNAL MEDICINE

## 2020-04-22 PROCEDURE — 99214 OFFICE O/P EST MOD 30 MIN: CPT | Mod: 95,,, | Performed by: INTERNAL MEDICINE

## 2020-04-22 PROCEDURE — 99214 PR OFFICE/OUTPT VISIT, EST, LEVL IV, 30-39 MIN: ICD-10-PCS | Mod: 95,,, | Performed by: INTERNAL MEDICINE

## 2020-04-22 PROCEDURE — 1101F PR PT FALLS ASSESS DOC 0-1 FALLS W/OUT INJ PAST YR: ICD-10-PCS | Mod: CPTII,95,, | Performed by: INTERNAL MEDICINE

## 2020-04-22 PROCEDURE — 1159F MED LIST DOCD IN RCRD: CPT | Mod: 95,,, | Performed by: INTERNAL MEDICINE

## 2020-04-22 ASSESSMENT — ROUTINE ASSESSMENT OF PATIENT INDEX DATA (RAPID3)
PATIENT GLOBAL ASSESSMENT SCORE: 5
TOTAL RAPID3 SCORE: 4.89
WHEN YOU AWAKENED IN THE MORNING OVER THE LAST WEEK, PLEASE INDICATE THE AMOUNT OF TIME IT TAKES UNTIL YOU ARE AS LIMBER AS YOU WILL BE FOR THE DAY: 1
MDHAQ FUNCTION SCORE: 1.1
FATIGUE SCORE: 4
PAIN SCORE: 6
PSYCHOLOGICAL DISTRESS SCORE: 1.1
AM STIFFNESS SCORE: 1, YES

## 2020-04-22 NOTE — PROGRESS NOTES
Rapid3 Question Responses and Scores 4/21/2020   MDHAQ Score 1.1   Psychologic Score 1.1   Pain Score 6   When you awakened in the morning OVER THE LAST WEEK, did you feel stiff? Yes   If Yes, please indicate the number of hours until you are as limber as you will be for the day 1   Fatigue Score 4   Global Health Score 5   RAPID3 Score 4.88       Answers for HPI/ROS submitted by the patient on 4/21/2020   fever: No  eye redness: No  headaches: Yes  shortness of breath: Yes  chest pain: No  trouble swallowing: No  diarrhea: No  constipation: No  unexpected weight change: No  genital sore: No  During the last 3 days, have you had a skin rash?: No  Bruises or bleeds easily: Yes  cough: No

## 2020-04-22 NOTE — PROGRESS NOTES
Subjective:       Patient ID: Rojelio Dye Sr. is a 74 y.o. male.    Chief Complaint: Joint pain    HPI:  Rojelio Dye Sr. is a 74 y.o. 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       In 9/2013 there was +RF but normal joint scan and inflammatory markers.  Consulted Dr. Olmstead who did surgeries on both feet.  Left foot now with infection.  Right foot has worsened after surgery.   Trouble with wound healing.  In past had to have PICC line for antibiotics.     Saw Dr. Olmstead couple weeks ago and he wants to pin another toe.  Now with infection in toe but antibiotics did not help.       Dr. Elijah Espinal orthopedics on [facility] injects knees with (last 11/30/2018) helped left knee but not right knee.  Hospitalized 12/21/18 to 1/7/19 for cervical cord compression with myelopathy.  Had surgical anterior and posterior C4, 5 and 6 fusion.    Interval History:    Stopped MTX 3 weeks ago.  He had taken it for 1 month and may have had improvement in knee pain but also had injections.     Nephrology and primary are fine with us starting methotrexate.  Pain in knees is 7/10 ache.  Nothing improves.  Worsened with use.    No falls since last visit but still has balance issues.             Review of Systems   Constitutional: Positive for fatigue and unexpected weight change (weight loss). Negative for fever.   HENT: Negative.  Negative for trouble swallowing.    Eyes: Negative for redness.   Respiratory: Positive for shortness of breath (on exertion). Negative for cough.    Cardiovascular: Negative for chest pain.   Gastrointestinal: Negative for constipation and diarrhea.   Endocrine: Negative.    Genitourinary: Negative.  Negative for genital sores.   Musculoskeletal: Positive for arthralgias and myalgias.   Skin: Negative for rash.   Allergic/Immunologic: Negative.    Neurological: Positive for headaches.        Off  balance   Hematological: Bruises/bleeds easily.   Psychiatric/Behavioral: Negative.          Objective:   There were no vitals taken for this visit.  Virtual visit     Physical Exam   Constitutional: He is oriented to person, place, and time and well-developed, well-nourished, and in no distress.   HENT:   Head: Normocephalic and atraumatic.   Eyes: Conjunctivae and EOM are normal.   Neurological: He is alert and oriented to person, place, and time.   Psychiatric: Mood and affect normal.   Musculoskeletal:                 Labs  Dr. Grajeda's labs 12/18/18  ESR= 33 (nl 0-15) however labs sat for long time therefore not valid  CRP=0.2  (nl<0.5)  LOUISE negative  Rheumatoid factor=12 (nl<14)  PI=704 ([id number])    Component      Latest Ref Rng & Units 2/6/2020 1/29/2020   WBC      3.90 - 12.70 K/uL  4.82   RBC      4.60 - 6.20 M/uL  4.42 (L)   Hemoglobin      14.0 - 18.0 g/dL  13.0 (L)   Hematocrit      40.0 - 54.0 %  39.3 (L)   MCV      82 - 98 fL  89   MCH      27.0 - 31.0 pg  29.4   MCHC      32.0 - 36.0 g/dL  33.1   RDW      11.5 - 14.5 %  13.1   Platelets      150 - 350 K/uL  128 (L)   MPV      9.2 - 12.9 fL  12.0   Immature Granulocytes      0.0 - 0.5 %  0.6 (H)   Gran # (ANC)      1.8 - 7.7 K/uL  2.8   Immature Grans (Abs)      0.00 - 0.04 K/uL  0.03   Lymph #      1.0 - 4.8 K/uL  1.1   Mono #      0.3 - 1.0 K/uL  0.7   Eos #      0.0 - 0.5 K/uL  0.1   Baso #      0.00 - 0.20 K/uL  0.04   nRBC      0 /100 WBC  0   Gran%      38.0 - 73.0 %  58.3   Lymph%      18.0 - 48.0 %  23.7   Mono%      4.0 - 15.0 %  14.7   Eosinophil%      0.0 - 8.0 %  1.9   Basophil%      0.0 - 1.9 %  0.8   Differential Method        Automated   Sodium      136 - 145 mmol/L 144 143   Potassium      3.5 - 5.1 mmol/L 4.1 3.9   Chloride      95 - 110 mmol/L 104 107   CO2      23 - 29 mmol/L 29 28   Glucose      70 - 110 mg/dL 86 150 (H)   BUN, Bld      8 - 23 mg/dL 35 (H) 34 (H)   Creatinine      0.5 - 1.4 mg/dL 1.9 (H) 1.8 (H)   Calcium      8.7 -  10.5 mg/dL 10.1 9.6   PROTEIN TOTAL      6.0 - 8.4 g/dL  7.0   Albumin      3.5 - 5.2 g/dL 4.0 3.9   BILIRUBIN TOTAL      0.1 - 1.0 mg/dL  0.5   Alkaline Phosphatase      55 - 135 U/L  123   AST      10 - 40 U/L  25   ALT      10 - 44 U/L  31   Anion Gap      8 - 16 mmol/L 11 8   eGFR if African American      >60 mL/min/1.73 m:2 39.8 (A) 42.5 (A)   eGFR if non African American      >60 mL/min/1.73 m:2 34.5 (A) 36.8 (A)   Phosphorus      2.7 - 4.5 mg/dL 4.5    Sed Rate      0 - 23 mm/Hr  17   CRP      0.0 - 8.2 mg/L  3.9   PTH      9.0 - 77.0 pg/mL 68.0    Uric Acid      3.4 - 7.0 mg/dL 7.1 (H)        Assessment:       1. Psoriatic arthritis.  Prior differential for arthralgias: OA vs. RA vs PsA vs Polyarticular Gout; positive RF but in past but now negative normal ESR and CRP previously; erosions on X-ray right 1st MTP, suspicious for pencil in cup deformity in hands; Will classify as psoriatic arthritis. Still with diffuse pain.  Right middle PIP very mobile  2. History of osteoarthrosis in multiple joints. Erosive OA on x-ray  3. Chondrocalinosis  4. Positive rheumatoid factor in past.  Now negative  5. Diabetes.  Not on any medications.  Tragenta caused glucose to drop  6. Schwanoma.  In past located outside of the lungs  7. Hyperuricemia  8.  Psoriasis diagnosed 2017  9.  Stage 3 renal disease  10. Cervical cord compression with myelopathy.    11.  SOB  12.  Fatigue    Plan:       1.  Patient stopped methotrexate 5 mg weekly due to pandemic.  Will continue to hold until pandemic resolves.  2.  Would hold on Plaquenil since it could flare psoriasis  3.  Continue colchicine and allopurinol 100 mg daily.    4.  Patient to follow with primary/cardiology regarding elevated BP in evening.    RTO 3 months/prn    The patient location is: home  The chief complaint leading to consultation is: Psoriatic arthritis  Visit type: TELE AUDIOVISUAL:88213  Total time spent with patient:  20 minutes  Each patient to whom he or she  provides medical services by telemedicine is:  (1) informed of the relationship between the physician and patient and the respective role of any other health care provider with respect to management of the patient; and (2) notified that he or she may decline to receive medical services by telemedicine and may withdraw from such care at any time.    Notes: see above

## 2020-05-08 RX ORDER — GABAPENTIN 300 MG/1
CAPSULE ORAL
Qty: 90 CAPSULE | Refills: 2 | OUTPATIENT
Start: 2020-05-08

## 2020-05-15 DIAGNOSIS — M79.642 PAIN IN BOTH HANDS: ICD-10-CM

## 2020-05-15 DIAGNOSIS — M11.20 PSEUDOGOUT: ICD-10-CM

## 2020-05-15 DIAGNOSIS — M79.641 PAIN IN BOTH HANDS: ICD-10-CM

## 2020-05-15 RX ORDER — COLCHICINE 0.6 MG/1
TABLET, FILM COATED ORAL
Qty: 60 TABLET | Refills: 1 | Status: SHIPPED | OUTPATIENT
Start: 2020-05-15 | End: 2020-09-14

## 2020-05-22 ENCOUNTER — PATIENT MESSAGE (OUTPATIENT)
Dept: RHEUMATOLOGY | Facility: CLINIC | Age: 75
End: 2020-05-22

## 2020-07-01 ENCOUNTER — OFFICE VISIT (OUTPATIENT)
Dept: OPTOMETRY | Facility: CLINIC | Age: 75
End: 2020-07-01
Payer: MEDICARE

## 2020-07-01 DIAGNOSIS — Z13.5 GLAUCOMA SCREENING: ICD-10-CM

## 2020-07-01 DIAGNOSIS — H52.4 HYPEROPIA WITH ASTIGMATISM AND PRESBYOPIA, BILATERAL: ICD-10-CM

## 2020-07-01 DIAGNOSIS — H25.13 NUCLEAR SCLEROSIS, BILATERAL: Primary | ICD-10-CM

## 2020-07-01 DIAGNOSIS — H43.393 VITREOUS FLOATERS, BILATERAL: ICD-10-CM

## 2020-07-01 DIAGNOSIS — H52.203 HYPEROPIA WITH ASTIGMATISM AND PRESBYOPIA, BILATERAL: ICD-10-CM

## 2020-07-01 DIAGNOSIS — H52.03 HYPEROPIA WITH ASTIGMATISM AND PRESBYOPIA, BILATERAL: ICD-10-CM

## 2020-07-01 PROCEDURE — 99999 PR PBB SHADOW E&M-EST. PATIENT-LVL III: CPT | Mod: PBBFAC,,, | Performed by: OPTOMETRIST

## 2020-07-01 PROCEDURE — 92015 DETERMINE REFRACTIVE STATE: CPT | Mod: S$GLB,,, | Performed by: OPTOMETRIST

## 2020-07-01 PROCEDURE — 99999 PR PBB SHADOW E&M-EST. PATIENT-LVL III: ICD-10-PCS | Mod: PBBFAC,,, | Performed by: OPTOMETRIST

## 2020-07-01 PROCEDURE — 92015 PR REFRACTION: ICD-10-PCS | Mod: S$GLB,,, | Performed by: OPTOMETRIST

## 2020-07-01 PROCEDURE — 92004 PR EYE EXAM, NEW PATIENT,COMPREHESV: ICD-10-PCS | Mod: S$GLB,,, | Performed by: OPTOMETRIST

## 2020-07-01 PROCEDURE — 92004 COMPRE OPH EXAM NEW PT 1/>: CPT | Mod: S$GLB,,, | Performed by: OPTOMETRIST

## 2020-07-01 NOTE — PATIENT INSTRUCTIONS
"DRY EYES -- BURNING OR FROYLAN SYMPTOMS:  Use Over The Counter artificial tears as needed for dry eye symptoms.   Some common brands include:  Systane, Optive, Refresh, and Thera-Tears.  These drops can be used as frequently as desired, but may be most helpful use during long periods of concentrated work.  For example, reading / working at the computer. Start with 3-4x per day.     Nighttime Ophthalmic gel or ointments are available: Refresh PM, Genteal, and Lacrilube.    Avoid drops that "get redness out" (Visine, Murine, Clear Eyes), as these may contain medication that could further irritate the eyes, especially with chronic use.    ALLERGY EYES -- ITCHING SYMPTOMS:  Over the counter medications include--Pataday, Zaditor, and Alaway.  Use as directed 1-2 drops daily for symptoms of itching / watering eyes.  These drops will not help for dry eye or exposure symptoms.    REDNESS RELIEF:  Lumify---is a good redness reliever that will not cause irritation if used chronically.         FLASHES / FLOATERS / POSTERIOR VITREOUS DETACHMENT    Call the clinic if you have any further changes in symptoms.  Including:  Increased numbers of floaters or flashing lights, dimness or darkness that moves through or stays constant in your vision, or any pain in the eye (s).    You may sometimes see small specks or clouds moving in your field of vision.  They are called FLOATERS.  You can often see them when looking at a plain background, like a blank wall or blue erika.  Floaters are actually tiny clumps of gel or cells inside the VITREOUS, the clear jelly-like fluid that fills the inside of your eye.    While these objects look like they are in front of your eye, they are actually floating inside.  What you see are the shadows they cast on the RETINA, the nerve layer at the back of the eye that senses light and allows you to see.      POSTERIOR VITREOUS DETACHMENT    The appearance of new floaters may be alarming.  If you suddenly " develop new floaters, you should contact your eye care professional  right away.    The retina can tear if the shrinking vitreous pulls away from the wall of the eye.  This sometimes causes a small amount of bleeding in the eye that may appear as new floaters.    A torn retina is always a serious problem, since it can lead to a retinal detachment.  You should see your eye care professional as soon as possible if:     even one new floater appears suddenly;   you see sudden flashes of light;   you notice other symptoms, like the loss of side vision, or a curtain closes down in your vision        POSTERIOR VITREOUS DETACHMENT is more common for people who:     are nearsighted;   have had cataract surgery;   have had YAG laser surgery of the eye;   have had inflammation inside the eye;   are over age 60.      While some floaters may remain visible, many of them will fade over time and become less noticeable.  Even if you've had some floaters for years, you should have your eyes checked as soon as possible if you notice new ones.    FLASHING LIGHTS    When the vitreous gel rubs or pulls on the retina, you may see what look like flashing lights or lightning streaks.  These flashes can appear off and on for several weeks or months.      Some people experience flashes of light that appear as jagged lines or heat waves in both eyes, lasting 10-20 minutes.  These flashes are caused by a spasm of blood vessels in the brain, which is called a migraine.    If a headache follows these flashes, it's called a migraine headache.  If   no headache occurs, these flashes are called Ophthalmic or Ocular Migraine.            Early Cataracts--not visually significant for surgery consultation.    What Are Cataracts?  A clear lens in the eye focuses light. This lets the eye see images sharply. With age, the lens slowly becomes cloudy. The cloudy lens is a cataract. A cataract scatters light and makes it hard for the eye to  focus. Cataracts often form in both eyes. But one lens may cloud faster than the other.      The Aging of Your Lens    Your lens may cloud so slowly that you don`t notice any vision changes at first. But as the cataract gets worse, the eye has a harder time focusing. In early stages, glasses may help you see better. As the lens gets cloudier, your doctor may recommend surgery to restore your vision.

## 2020-08-10 ENCOUNTER — PATIENT MESSAGE (OUTPATIENT)
Dept: RHEUMATOLOGY | Facility: CLINIC | Age: 75
End: 2020-08-10

## 2020-08-10 ENCOUNTER — TELEPHONE (OUTPATIENT)
Dept: NEPHROLOGY | Facility: CLINIC | Age: 75
End: 2020-08-10

## 2020-08-10 DIAGNOSIS — L40.50 PSORIATIC ARTHRITIS: Primary | ICD-10-CM

## 2020-08-10 DIAGNOSIS — R76.8 RHEUMATOID FACTOR POSITIVE: ICD-10-CM

## 2020-08-10 DIAGNOSIS — M11.20 PSEUDOGOUT: ICD-10-CM

## 2020-08-10 DIAGNOSIS — M10.9 GOUT, ARTHRITIS: ICD-10-CM

## 2020-08-10 NOTE — TELEPHONE ENCOUNTER
Spoke to the pt and he had cancelled his appt with Dr Brandt through the [facility] for August 18 that he could not make.  When asked, he said that he will keep his lab appointment on 8-13-20 and wait to be notified when to return to the office, he did not want to reschedule at this point.

## 2020-08-10 NOTE — TELEPHONE ENCOUNTER
Message    Appointment canceled for Rojelio Dye Sr. ([id number])   Visit Type: EST PATIENT - NEPHROLOGY   Date        Time      Length    Provider                  Department   12/21/2020   3:40 PM  20 mins.  Rodriguez Brandt MD        [facility] NEPHROLOGY      Reason for Cancellation: Canceled via MyChart

## 2020-08-13 ENCOUNTER — LAB VISIT (OUTPATIENT)
Dept: LAB | Facility: HOSPITAL | Age: 75
End: 2020-08-13
Attending: INTERNAL MEDICINE
Payer: MEDICARE

## 2020-08-13 DIAGNOSIS — N18.30 CHRONIC KIDNEY DISEASE, STAGE III (MODERATE): ICD-10-CM

## 2020-08-13 LAB
ALBUMIN SERPL BCP-MCNC: 3.9 G/DL (ref 3.5–5.2)
ANION GAP SERPL CALC-SCNC: 7 MMOL/L (ref 8–16)
BUN SERPL-MCNC: 48 MG/DL (ref 8–23)
CALCIUM SERPL-MCNC: 9.5 MG/DL (ref 8.7–10.5)
CHLORIDE SERPL-SCNC: 109 MMOL/L (ref 95–110)
CO2 SERPL-SCNC: 21 MMOL/L (ref 23–29)
CREAT SERPL-MCNC: 2 MG/DL (ref 0.5–1.4)
EST. GFR  (AFRICAN AMERICAN): 37.2 ML/MIN/1.73 M^2
EST. GFR  (NON AFRICAN AMERICAN): 32.2 ML/MIN/1.73 M^2
GLUCOSE SERPL-MCNC: 169 MG/DL (ref 70–110)
PHOSPHATE SERPL-MCNC: 4.1 MG/DL (ref 2.7–4.5)
POTASSIUM SERPL-SCNC: 4.9 MMOL/L (ref 3.5–5.1)
PTH-INTACT SERPL-MCNC: 66 PG/ML (ref 9–77)
SODIUM SERPL-SCNC: 137 MMOL/L (ref 136–145)

## 2020-08-13 PROCEDURE — 80069 RENAL FUNCTION PANEL: CPT

## 2020-08-13 PROCEDURE — 36415 COLL VENOUS BLD VENIPUNCTURE: CPT | Mod: PO

## 2020-08-13 PROCEDURE — 83970 ASSAY OF PARATHORMONE: CPT

## 2020-08-21 ENCOUNTER — PATIENT MESSAGE (OUTPATIENT)
Dept: RHEUMATOLOGY | Facility: CLINIC | Age: 75
End: 2020-08-21

## 2020-08-21 DIAGNOSIS — M25.571 ACUTE RIGHT ANKLE PAIN: Primary | ICD-10-CM

## 2020-08-22 ENCOUNTER — PATIENT MESSAGE (OUTPATIENT)
Dept: RHEUMATOLOGY | Facility: CLINIC | Age: 75
End: 2020-08-22

## 2020-08-24 ENCOUNTER — HOSPITAL ENCOUNTER (OUTPATIENT)
Dept: RADIOLOGY | Facility: HOSPITAL | Age: 75
Discharge: HOME | End: 2020-08-24
Attending: ORTHOPAEDIC SURGERY
Payer: MEDICARE

## 2020-08-24 ENCOUNTER — OFFICE VISIT (OUTPATIENT)
Dept: ORTHOPEDICS | Facility: CLINIC | Age: 75
End: 2020-08-24
Payer: MEDICARE

## 2020-08-24 VITALS
BODY MASS INDEX: 24.1 KG/M2 | HEIGHT: 68 IN | WEIGHT: 159 LBS | DIASTOLIC BLOOD PRESSURE: 86 MMHG | SYSTOLIC BLOOD PRESSURE: 144 MMHG | HEART RATE: 79 BPM

## 2020-08-24 DIAGNOSIS — E11.610 DIABETIC CHARCOT FOOT: Primary | ICD-10-CM

## 2020-08-24 DIAGNOSIS — G89.29 CHRONIC PAIN OF RIGHT ANKLE: ICD-10-CM

## 2020-08-24 DIAGNOSIS — M25.571 CHRONIC PAIN OF RIGHT ANKLE: ICD-10-CM

## 2020-08-24 DIAGNOSIS — M25.571 ACUTE RIGHT ANKLE PAIN: ICD-10-CM

## 2020-08-24 PROCEDURE — 73610 X-RAY EXAM OF ANKLE: CPT | Mod: 26,RT,, | Performed by: RADIOLOGY

## 2020-08-24 PROCEDURE — 99214 OFFICE O/P EST MOD 30 MIN: CPT | Mod: S$GLB,,, | Performed by: ORTHOPAEDIC SURGERY

## 2020-08-24 PROCEDURE — 3008F BODY MASS INDEX DOCD: CPT | Mod: CPTII,S$GLB,, | Performed by: ORTHOPAEDIC SURGERY

## 2020-08-24 PROCEDURE — 1125F PR PAIN SEVERITY QUANTIFIED, PAIN PRESENT: ICD-10-PCS | Mod: S$GLB,,, | Performed by: ORTHOPAEDIC SURGERY

## 2020-08-24 PROCEDURE — 1101F PT FALLS ASSESS-DOCD LE1/YR: CPT | Mod: CPTII,S$GLB,, | Performed by: ORTHOPAEDIC SURGERY

## 2020-08-24 PROCEDURE — 99214 PR OFFICE/OUTPT VISIT, EST, LEVL IV, 30-39 MIN: ICD-10-PCS | Mod: S$GLB,,, | Performed by: ORTHOPAEDIC SURGERY

## 2020-08-24 PROCEDURE — 3051F HG A1C>EQUAL 7.0%<8.0%: CPT | Mod: CPTII,S$GLB,, | Performed by: ORTHOPAEDIC SURGERY

## 2020-08-24 PROCEDURE — 3079F DIAST BP 80-89 MM HG: CPT | Mod: CPTII,S$GLB,, | Performed by: ORTHOPAEDIC SURGERY

## 2020-08-24 PROCEDURE — 1101F PR PT FALLS ASSESS DOC 0-1 FALLS W/OUT INJ PAST YR: ICD-10-PCS | Mod: CPTII,S$GLB,, | Performed by: ORTHOPAEDIC SURGERY

## 2020-08-24 PROCEDURE — 73610 XR ANKLE COMPLETE 3 VIEW RIGHT: ICD-10-PCS | Mod: 26,RT,, | Performed by: RADIOLOGY

## 2020-08-24 PROCEDURE — 73610 X-RAY EXAM OF ANKLE: CPT | Mod: TC,PO,RT

## 2020-08-24 PROCEDURE — 3008F PR BODY MASS INDEX (BMI) DOCUMENTED: ICD-10-PCS | Mod: CPTII,S$GLB,, | Performed by: ORTHOPAEDIC SURGERY

## 2020-08-24 PROCEDURE — 99999 PR PBB SHADOW E&M-EST. PATIENT-LVL V: ICD-10-PCS | Mod: PBBFAC,,, | Performed by: ORTHOPAEDIC SURGERY

## 2020-08-24 PROCEDURE — 1159F PR MEDICATION LIST DOCUMENTED IN MEDICAL RECORD: ICD-10-PCS | Mod: S$GLB,,, | Performed by: ORTHOPAEDIC SURGERY

## 2020-08-24 PROCEDURE — 3051F PR MOST RECENT HEMOGLOBIN A1C LEVEL 7.0 - < 8.0%: ICD-10-PCS | Mod: CPTII,S$GLB,, | Performed by: ORTHOPAEDIC SURGERY

## 2020-08-24 PROCEDURE — 3077F SYST BP >= 140 MM HG: CPT | Mod: CPTII,S$GLB,, | Performed by: ORTHOPAEDIC SURGERY

## 2020-08-24 PROCEDURE — 3079F PR MOST RECENT DIASTOLIC BLOOD PRESSURE 80-89 MM HG: ICD-10-PCS | Mod: CPTII,S$GLB,, | Performed by: ORTHOPAEDIC SURGERY

## 2020-08-24 PROCEDURE — 3077F PR MOST RECENT SYSTOLIC BLOOD PRESSURE >= 140 MM HG: ICD-10-PCS | Mod: CPTII,S$GLB,, | Performed by: ORTHOPAEDIC SURGERY

## 2020-08-24 PROCEDURE — 1125F AMNT PAIN NOTED PAIN PRSNT: CPT | Mod: S$GLB,,, | Performed by: ORTHOPAEDIC SURGERY

## 2020-08-24 PROCEDURE — 1159F MED LIST DOCD IN RCRD: CPT | Mod: S$GLB,,, | Performed by: ORTHOPAEDIC SURGERY

## 2020-08-24 PROCEDURE — 99999 PR PBB SHADOW E&M-EST. PATIENT-LVL V: CPT | Mod: PBBFAC,,, | Performed by: ORTHOPAEDIC SURGERY

## 2020-08-24 RX ORDER — SPIRONOLACTONE 50 MG/1
TABLET, FILM COATED ORAL
COMMUNITY
Start: 2020-07-28 | End: 2020-11-18 | Stop reason: CLARIF

## 2020-08-24 NOTE — LETTER
August 24, 2020      Elijah Espinal MD  [address] [address]  [address]  [place] [address]           [facility]  [address] [place] [address]  Phone: [phone]          Patient: Rojelio Dye Sr.   MR Number: [id number]   YOB: 1945   Date of Visit: 8/24/2020       Dear Dr. Elijah Espinal:    Thank you for referring Rojelio Dye to me for evaluation. Attached you will find relevant portions of my assessment and plan of care.    If you have questions, please do not hesitate to call me. I look forward to following Rojelio Dye along with you.    Sincerely,    Brendon Arshad MD    Enclosure  CC:  No Recipients    If you would like to receive this communication electronically, please contact [email or web address] or [phone] to request more information on [employer] access.    For providers and/or their staff who would like to refer a patient to [facility], please contact us through our one-stop-shop provider referral line, [facility], at [phone].    If you feel you have received this communication in error or would no longer like to receive these types of communications, please e-mail [email or web address]

## 2020-08-24 NOTE — PROGRESS NOTES
"HPI: Rojelio Dye Sr. is a 75 y.o. male who was referred to me by Dr. Elijah Espinal and was seen in consultation today for right foot pain. He rates his pain as 10/10 today.  No history of injury or trauma. He had has bilateral triple arthrodesis. His right side was done by Dr. Olmstead in 2015. He notes that it was not as straight as he expected after surgery and Dr. Olmstead could not get it aligned as well as he would have liked.   He notes that he had multiple open wounds and had wound care and bariatrics to heal the wounds.   The pain is worse with walking and standing.   The patient has T2DM and the last HbA1c was 7.1.  He has h/o peripheral neuropathy, peripheral vascular disease, Stage III CRD and psoriatic osteoarthritis.   He is here with his wife today.         PAST MEDICAL/SURGICAL/FAMILY/SOCIAL/ HISTORY: REVIEWED    ALLERGIES/MEDICATIONS: REVIEWED       Review of Systems:     Constitution: Negative.   HEENT: Negative.   Eyes: Negative.   Cardiovascular: Negative.   Respiratory: Negative.   Endocrine: Negative.   Hematologic/Lymphatic: Negative.   Skin: Negative.   Musculoskeletal: See HPI  Gastrointestinal: Negative.   Genitourinary: Negative.   Neurological: Negative.   Psychiatric/Behavioral: Negative.   Allergic/Immunologic: Negative.       PHYSICAL EXAM:  Vitals:    08/24/20 1534   BP: (!) 144/86   Pulse: 79     Ht Readings from Last 1 Encounters:   08/24/20 5' 8" (1.727 m)     Wt Readings from Last 1 Encounters:   08/24/20 72.1 kg (159 lb)         GENERAL: Well developed, well nourished, no acute distress.  SKIN: Skin is intact. No atrophy, abrasions or lesions are noted.   Neurological: Normal mental status. Appropriate and conversant. Alert and oriented x 3.  GAIT: Walks with an antalgic gait.    Bilateral lower extremities:  No palpable dorsalis pedis pulse.  Capillary refill < 3 seconds.  Limited range of motion tibiotalar joints and no ROM subtalar joints. Severe pes planovalgus with medial " column collapse as well on the right. The right is much worse than the left.   5/5 strength EHL, FHL, tibialis anterior, gastrocsoleus. Sensation to light touch intact sural, saphenous, superficial peroneal and deep peroneal nerves. Mild swelling. No lymphadenopathy, no masses or tumors palpated.  Two small callus plantar medial foot on the right. No evidence of active charcot.    XRAYS:   3 views of right ankle obtained and reviewed today reveal severe pes planovalgus s/p triple with collapse of the hindfoot.       ASSESSMENT:        Encounter Diagnosis   Name Primary?    Chronic pain of right ankle     Severe pes planovalgus, charcot arthropathy    PLAN: I spent 45 minutes in consulation with the patient and his wife today. More than half the time was spent counseling the patient on his condition and the options for operative versus non-operative care.  Unfortunately I agree with Dr. Olmstead and I do not think he is a candidate for further surgical treatment due to the severity of the deformity and contractures of the skin. I do think he may benefit from orthoses. I wrote him and prescription for CROW for the right and [place] brace for the left.   I also discuss below-knee amputation with them as an option and I cautioned them that if he winds up with wound break down in future he may require below-knee amputation. I also referred them to Dr. Montes De Oca for routine diabetic foot care as he has very high risk feet. I recommend he see Dr. Montes De Oca every 6 months.   I discussed with him that I wish that limb salvage was possible for him and I would be happy to do it for him if it were, but in my opinion it is not. I gave him a handout on charcot arthropathy.   If he cannot tolerate the braces or he decides he would like to proceed with below-knee amputation he can f/u with me to discuss a plan for it.

## 2020-09-01 ENCOUNTER — OFFICE VISIT (OUTPATIENT)
Dept: RHEUMATOLOGY | Facility: CLINIC | Age: 75
End: 2020-09-01
Payer: MEDICARE

## 2020-09-01 DIAGNOSIS — M15.9 PRIMARY OSTEOARTHRITIS INVOLVING MULTIPLE JOINTS: ICD-10-CM

## 2020-09-01 DIAGNOSIS — M11.20 PSEUDOGOUT: ICD-10-CM

## 2020-09-01 DIAGNOSIS — L40.50 PSORIATIC ARTHRITIS: Primary | ICD-10-CM

## 2020-09-01 DIAGNOSIS — R76.8 RHEUMATOID FACTOR POSITIVE: ICD-10-CM

## 2020-09-01 DIAGNOSIS — R53.83 FATIGUE, UNSPECIFIED TYPE: ICD-10-CM

## 2020-09-01 PROCEDURE — 1101F PR PT FALLS ASSESS DOC 0-1 FALLS W/OUT INJ PAST YR: ICD-10-PCS | Mod: CPTII,95,, | Performed by: INTERNAL MEDICINE

## 2020-09-01 PROCEDURE — 1125F PR PAIN SEVERITY QUANTIFIED, PAIN PRESENT: ICD-10-PCS | Mod: 95,,, | Performed by: INTERNAL MEDICINE

## 2020-09-01 PROCEDURE — 1101F PT FALLS ASSESS-DOCD LE1/YR: CPT | Mod: CPTII,95,, | Performed by: INTERNAL MEDICINE

## 2020-09-01 PROCEDURE — 99214 OFFICE O/P EST MOD 30 MIN: CPT | Mod: 95,,, | Performed by: INTERNAL MEDICINE

## 2020-09-01 PROCEDURE — 1125F AMNT PAIN NOTED PAIN PRSNT: CPT | Mod: 95,,, | Performed by: INTERNAL MEDICINE

## 2020-09-01 PROCEDURE — 1159F MED LIST DOCD IN RCRD: CPT | Mod: 95,,, | Performed by: INTERNAL MEDICINE

## 2020-09-01 PROCEDURE — 99214 PR OFFICE/OUTPT VISIT, EST, LEVL IV, 30-39 MIN: ICD-10-PCS | Mod: 95,,, | Performed by: INTERNAL MEDICINE

## 2020-09-01 PROCEDURE — 1159F PR MEDICATION LIST DOCUMENTED IN MEDICAL RECORD: ICD-10-PCS | Mod: 95,,, | Performed by: INTERNAL MEDICINE

## 2020-09-01 ASSESSMENT — ROUTINE ASSESSMENT OF PATIENT INDEX DATA (RAPID3)
PAIN SCORE: 10
PATIENT GLOBAL ASSESSMENT SCORE: 5
WHEN YOU AWAKENED IN THE MORNING OVER THE LAST WEEK, PLEASE INDICATE THE AMOUNT OF TIME IT TAKES UNTIL YOU ARE AS LIMBER AS YOU WILL BE FOR THE DAY: 1 HOUR
TOTAL RAPID3 SCORE: 5.56
AM STIFFNESS SCORE: 1, YES
PSYCHOLOGICAL DISTRESS SCORE: 2.2
MDHAQ FUNCTION SCORE: 0.5
FATIGUE SCORE: 5

## 2020-09-01 NOTE — PROGRESS NOTES
Rapid3 Question Responses and Scores 9/1/2020   MDHAQ Score 0.5   Psychologic Score 2.2   Pain Score 10   When you awakened in the morning OVER THE LAST WEEK, did you feel stiff? Yes   If Yes, please indicate the number of hours until you are as limber as you will be for the day 1   Fatigue Score 5   Global Health Score 5   RAPID3 Score 5.55       Answers for HPI/ROS submitted by the patient on 9/1/2020   fever: No  eye redness: No  mouth sores: No  headaches: Yes  shortness of breath: Yes  chest pain: No  trouble swallowing: No  diarrhea: No  constipation: No  unexpected weight change: No  genital sore: No  During the last 3 days, have you had a skin rash?: No  Bruises or bleeds easily: Yes  cough: No

## 2020-09-01 NOTE — PROGRESS NOTES
Subjective:       Patient ID: Rojelio Dye Sr. is a 75 y.o. male.    Chief Complaint: Joint pain    HPI:  Rojelio Dye Sr. is a 75 y.o. 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       In 9/2013 there was +RF but normal joint scan and inflammatory markers.  Consulted Dr. Olmstead who did surgeries on both feet.  Left foot now with infection.  Right foot has worsened after surgery.   Trouble with wound healing.  In past had to have PICC line for antibiotics.     Saw Dr. Olmstead couple weeks ago and he wants to pin another toe.  Now with infection in toe but antibiotics did not help.       Dr. Elijah Espinal orthopedics on [facility] injects knees with (last 11/30/2018) helped left knee but not right knee.  Hospitalized 12/21/18 to 1/7/19 for cervical cord compression with myelopathy.  Had surgical anterior and posterior C4, 5 and 6 fusion.    Interval History:    Saw foot specialists that he has two Charcot feet and that he may have to have it eventually amputated. He was given order for large surgical boot to help on right and Pro boot on left.  The injections in the knees led to increase in BP. BP meds were adjusted but had muscle cramps.   Will need left knee x-rayed to evaluate.  He lost inch and a half on right leg.      Stopped MTX due to pandemic and is still off.  Has a small wound and an ulcer on right leg.  Very slight drainage but not as bad as he has had with other wounds.  Feels this because swelling improved with med lasix from cardiology.     Nephrology and primary are fine with us starting methotrexate.  Pain of body cramps is 5/10 ache all over and 10/10 on right leg.  Gets cramps in various areas of the body.  When not cramping has knee pain bilaterally.  Left shoulder difficult to lift heavy things.   Nothing improves.  Worsened with use.    No falls since last visit.  He has balance issues.              Review of Systems   Constitutional: Positive for fatigue and unexpected weight change (weight loss). Negative for fever.   HENT: Negative.  Negative for mouth sores and trouble swallowing.    Eyes: Negative for redness.   Respiratory: Positive for shortness of breath (on exertion). Negative for cough.    Cardiovascular: Negative for chest pain.   Gastrointestinal: Negative for constipation and diarrhea.   Endocrine: Negative.    Genitourinary: Negative.  Negative for genital sores.   Musculoskeletal: Positive for arthralgias and myalgias.   Skin: Negative for rash.   Allergic/Immunologic: Negative.    Neurological: Positive for headaches.        Off balance   Hematological: Bruises/bleeds easily.   Psychiatric/Behavioral: Negative.          Objective:   There were no vitals taken for this visit.  Virtual visit     Physical Exam   Constitutional: He is oriented to person, place, and time and well-developed, well-nourished, and in no distress.   HENT:   Head: Normocephalic and atraumatic.   Eyes: Conjunctivae and EOM are normal.   Neurological: He is alert and oriented to person, place, and time.   Psychiatric: Mood and affect normal.   Musculoskeletal:      Comments:               Labs  Dr. Grajeda's labs 12/18/18  ESR= 33 (nl 0-15) however labs sat for long time therefore not valid  CRP=0.2  (nl<0.5)  LOUISE negative  Rheumatoid factor=12 (nl<14)  ZP=352 ([id number])    Component      Latest Ref Rng & Units 8/20/2020   WBC      3.90 - 12.70 K/uL 4.79   RBC      4.60 - 6.20 M/uL 4.52 (L)   Hemoglobin      14.0 - 18.0 g/dL 13.5 (L)   Hematocrit      40.0 - 54.0 % 40.1   MCV      82 - 98 fL 89   MCH      27.0 - 31.0 pg 29.9   MCHC      32.0 - 36.0 g/dL 33.7   RDW      11.5 - 14.5 % 12.8   Platelets      150 - 350 K/uL 114 (L)   MPV      9.2 - 12.9 fL 13.0 (H)   Immature Granulocytes      0.0 - 0.5 % 0.4   Gran # (ANC)      1.8 - 7.7 K/uL 3.2   Immature Grans (Abs)      0.00 - 0.04 K/uL 0.02   Lymph #      1.0 - 4.8  K/uL 1.0   Mono #      0.3 - 1.0 K/uL 0.5   Eos #      0.0 - 0.5 K/uL 0.1   Baso #      0.00 - 0.20 K/uL 0.03   nRBC      0 /100 WBC 0   Gran%      38.0 - 73.0 % 66.4   Lymph%      18.0 - 48.0 % 20.7   Mono%      4.0 - 15.0 % 10.0   Eosinophil%      0.0 - 8.0 % 1.9   Basophil%      0.0 - 1.9 % 0.6   Differential Method       Automated   Sodium      136 - 145 mmol/L 138   Potassium      3.5 - 5.1 mmol/L 5.0   Chloride      95 - 110 mmol/L 107   CO2      22 - 31 mmol/L 24   Glucose      70 - 110 mg/dL 172 (H)   BUN, Bld      9 - 21 mg/dL 42 (H)   Creatinine      0.50 - 1.40 mg/dL 2.12 (H)   Calcium      8.4 - 10.2 mg/dL 9.9   PROTEIN TOTAL      6.0 - 8.4 g/dL 6.7   Albumin      3.5 - 5.2 g/dL 4.3   BILIRUBIN TOTAL      0.2 - 1.3 mg/dL 0.7   Alkaline Phosphatase      38 - 145 U/L 107   AST      17 - 59 U/L 28   ALT      0 - 50 U/L 25   Anion Gap      8 - 16 mmol/L 7 (L)   eGFR if African American      >60 mL/min/1.73 m:2 35 (A)   eGFR if non African American      >60 mL/min/1.73 m:2 30 (A)   Uric Acid      3.4 - 7.0 mg/dL 6.4   Sed Rate      0 - 19 mm/Hr 10   CRP      0.00 - 0.90 mg/dL <0.50       Assessment:       1. Psoriatic arthritis.  Prior differential for arthralgias: OA vs. RA vs PsA vs Polyarticular Gout; positive RF but in past but now negative normal ESR and CRP previously; erosions on X-ray right 1st MTP, suspicious for pencil in cup deformity in hands; Will classify as psoriatic arthritis. Still with diffuse pain.  Right middle PIP very mobile  2. History of osteoarthrosis in multiple joints. Erosive OA on x-ray  3. Chondrocalinosis  4. Positive rheumatoid factor in past.  Now negative  5. Diabetes.  Not on any medications.  Tragenta caused glucose to drop  6. Schwanoma.  In past located outside of the lungs  7. Hyperuricemia  8.  Psoriasis diagnosed 2017  9.  Stage 3 renal disease  10. Cervical cord compression with myelopathy.    11.  SOB. On exertion.  On Lasix.  12.  Fatigue  13. Charcot joint both  feet  14. Wound of right leg.    Plan:       1.  Patient stopped methotrexate 5 mg weekly due to pandemic.  Will continue to hold until pandemic resolves and no wounds.  2.  Would hold on Plaquenil since it could flare psoriasis  3.  Continue colchicine and allopurinol 100 mg daily.    4.  Patient to follow with primary/cardiology regarding elevated BP in evening.  5. Patient will see pain management he has seen in the past.    RTO 3 months/prn    The patient location is: home  The chief complaint leading to consultation is: Psoriatic arthritis    Visit type: TELE AUDIOVISUAL:77029    Face to Face time with patient: 20 minutes  25 minutes of total time spent on the encounter, which includes face to face time and non-face to face time preparing to see the patient (eg, review of tests), Obtaining and/or reviewing separately obtained history, Documenting clinical information in the electronic or other health record, Independently interpreting results (not separately reported) and communicating results to the patient/family/caregiver, or Care coordination (not separately reported).         Each patient to whom he or she provides medical services by telemedicine is:  (1) informed of the relationship between the physician and patient and the respective role of any other health care provider with respect to management of the patient; and (2) notified that he or she may decline to receive medical services by telemedicine and may withdraw from such care at any time.    Notes: see above

## 2020-12-06 ENCOUNTER — PATIENT MESSAGE (OUTPATIENT)
Dept: RHEUMATOLOGY | Facility: CLINIC | Age: 75
End: 2020-12-06

## 2020-12-07 ENCOUNTER — OFFICE VISIT (OUTPATIENT)
Dept: RHEUMATOLOGY | Facility: CLINIC | Age: 75
End: 2020-12-07
Payer: MEDICARE

## 2020-12-07 DIAGNOSIS — L40.50 PSORIATIC ARTHRITIS: Primary | ICD-10-CM

## 2020-12-07 DIAGNOSIS — N18.2 CKD (CHRONIC KIDNEY DISEASE), STAGE II: ICD-10-CM

## 2020-12-07 DIAGNOSIS — M10.9 GOUT, ARTHRITIS: ICD-10-CM

## 2020-12-07 DIAGNOSIS — M11.20 PSEUDOGOUT: ICD-10-CM

## 2020-12-07 PROCEDURE — 1125F AMNT PAIN NOTED PAIN PRSNT: CPT | Mod: 95,,, | Performed by: INTERNAL MEDICINE

## 2020-12-07 PROCEDURE — 99214 PR OFFICE/OUTPT VISIT, EST, LEVL IV, 30-39 MIN: ICD-10-PCS | Mod: 95,,, | Performed by: INTERNAL MEDICINE

## 2020-12-07 PROCEDURE — 99214 OFFICE O/P EST MOD 30 MIN: CPT | Mod: 95,,, | Performed by: INTERNAL MEDICINE

## 2020-12-07 PROCEDURE — 1159F MED LIST DOCD IN RCRD: CPT | Mod: 95,,, | Performed by: INTERNAL MEDICINE

## 2020-12-07 PROCEDURE — 1125F PR PAIN SEVERITY QUANTIFIED, PAIN PRESENT: ICD-10-PCS | Mod: 95,,, | Performed by: INTERNAL MEDICINE

## 2020-12-07 PROCEDURE — 1159F PR MEDICATION LIST DOCUMENTED IN MEDICAL RECORD: ICD-10-PCS | Mod: 95,,, | Performed by: INTERNAL MEDICINE

## 2020-12-07 RX ORDER — INFLUENZA A VIRUS A/MICHIGAN/45/2015 X-275 (H1N1) ANTIGEN (FORMALDEHYDE INACTIVATED), INFLUENZA A VIRUS A/SINGAPORE/INFIMH-16-0019/2016 IVR-186 (H3N2) ANTIGEN (FORMALDEHYDE INACTIVATED), INFLUENZA B VIRUS B/PHUKET/3073/2013 ANTIGEN (FORMALDEHYDE INACTIVATED), AND INFLUENZA B VIRUS B/MARYLAND/15/2016 BX-69A ANTIGEN (FORMALDEHYDE INACTIVATED) 60; 60; 60; 60 UG/.7ML; UG/.7ML; UG/.7ML; UG/.7ML
INJECTION, SUSPENSION INTRAMUSCULAR
COMMUNITY
End: 2022-01-26 | Stop reason: CLARIF

## 2020-12-07 NOTE — PROGRESS NOTES
Subjective:       Patient ID: Rojelio Dye Sr. is a 75 y.o. male.    Chief Complaint: Joint pain    HPI:  Rojelio Dye Sr. is a 75 y.o. 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       In 9/2013 there was +RF but normal joint scan and inflammatory markers.  Consulted Dr. Olmstead who did surgeries on both feet.  Left foot now with infection.  Right foot has worsened after surgery.   Trouble with wound healing.  In past had to have PICC line for antibiotics.     Saw Dr. Olmstead couple weeks ago and he wants to pin another toe.  Now with infection in toe but antibiotics did not help.       Dr. Elijah Espinal orthopedics on [facility] injects knees with (last 11/30/2018) helped left knee but not right knee.  Hospitalized 12/21/18 to 1/7/19 for cervical cord compression with myelopathy.  Had surgical anterior and posterior C4, 5 and 6 fusion.    Saw foot specialists that he has two Charcot feet and that he may have to have it eventually amputated.  Seen by both Dr. Olmstead (not seen since 2015 or so) and Dr. Scott. He was given order for large surgical boot to help on right and Pro boot on left.  The injections in the knees led to increase in BP. BP meds were adjusted but had muscle cramps.   Will need left knee x-rayed to evaluate.  He lost inch and a half on right leg.      Interval History:    Since last visit had bilateral knees and left hip injected which helped.  Developed blisters on left leg now.  Has gotten them on right leg.  Primary thought blisters were from circulation.  Cardiology interventional did left leg venous ablation.  Stopped MTX after a few days but stopped due to recurrent blisters and due to pandemic and is still off.   Ultrasound on leg showed leaking valve so will have ablation again.      Nephrology and primary are fine with us starting methotrexate.  Pain today is all over left shoulder,  left hip, knees and ankles.  Pain 8/10 ache all over.  Jace mixed with topical lidocaine helps.  He is trying OTC Voltaren.  Nothing improves.  Worsened with use.    No falls since last visit.  He has balance issues.             Review of Systems   Constitutional: Positive for fatigue. Negative for fever and unexpected weight change.   HENT: Negative.  Negative for mouth sores and trouble swallowing.    Eyes: Negative for redness.   Respiratory: Positive for shortness of breath (on exertion persists). Negative for cough.    Cardiovascular: Negative for chest pain.   Gastrointestinal: Negative for constipation and diarrhea.   Endocrine: Negative.    Genitourinary: Negative.  Negative for genital sores.   Musculoskeletal: Positive for arthralgias and myalgias.   Skin: Negative for rash.   Allergic/Immunologic: Negative.    Neurological: Positive for headaches.        Off balance   Hematological: Bruises/bleeds easily.   Psychiatric/Behavioral: Negative.          Objective:   There were no vitals taken for this visit.  Virtual visit     Physical Exam   Constitutional: He is oriented to person, place, and time and well-developed, well-nourished, and in no distress.   HENT:   Head: Normocephalic and atraumatic.   Eyes: Conjunctivae and EOM are normal.   Neurological: He is alert and oriented to person, place, and time.   Psychiatric: Mood and affect normal.   Musculoskeletal:      Comments:                   Labs  Dr. Grajeda's labs 12/18/18  ESR= 33 (nl 0-15) however labs sat for long time therefore not valid  CRP=0.2  (nl<0.5)  LOUISE negative  Rheumatoid factor=12 (nl<14)  BU=545 ([id number])    Component      Latest Ref Rng & Units 9/1/2020 8/20/2020 8/13/2020   WBC      3.90 - 12.70 K/uL  4.79    RBC      4.60 - 6.20 M/uL  4.52 (L)    Hemoglobin      14.0 - 18.0 g/dL  13.5 (L)    Hematocrit      40.0 - 54.0 %  40.1    MCV      82 - 98 fL  89    MCH      27.0 - 31.0 pg  29.9    MCHC      32.0 - 36.0 g/dL  33.7    RDW       11.5 - 14.5 %  12.8    Platelets      150 - 350 K/uL  114 (L)    MPV      9.2 - 12.9 fL  13.0 (H)    Immature Granulocytes      0.0 - 0.5 %  0.4    Gran # (ANC)      1.8 - 7.7 K/uL  3.2    Immature Grans (Abs)      0.00 - 0.04 K/uL  0.02    Lymph #      1.0 - 4.8 K/uL  1.0    Mono #      0.3 - 1.0 K/uL  0.5    Eos #      0.0 - 0.5 K/uL  0.1    Baso #      0.00 - 0.20 K/uL  0.03    nRBC      0 /100 WBC  0    Gran %      38.0 - 73.0 %  66.4    Lymph %      18.0 - 48.0 %  20.7    Mono %      4.0 - 15.0 %  10.0    Eosinophil %      0.0 - 8.0 %  1.9    Basophil %      0.0 - 1.9 %  0.6    Differential Method        Automated    Sodium      136 - 145 mmol/L 134 (L) 138 137   Potassium      3.5 - 5.1 mmol/L 5.3 (H) 5.0 4.9   Chloride      95 - 110 mmol/L 106 107 109   CO2      22 - 31 mmol/L 21 (L) 24 21 (L)   Glucose      70 - 110 mg/dL 156 (H) 172 (H) 169 (H)   BUN      9 - 21 mg/dL 53 (H) 42 (H) 48 (H)   Creatinine      0.50 - 1.40 mg/dL 2.10 (H) 2.12 (H) 2.0 (H)   Calcium      8.4 - 10.2 mg/dL 9.7 9.9 9.5   PROTEIN TOTAL      6.0 - 8.4 g/dL 6.9 6.7    Albumin      3.5 - 5.2 g/dL 4.3 4.3 3.9   BILIRUBIN TOTAL      0.2 - 1.3 mg/dL 0.8 0.7    Alkaline Phosphatase      38 - 145 U/L 106 107    AST      17 - 59 U/L 31 28    ALT      0 - 50 U/L 34 25    Anion Gap      8 - 16 mmol/L 7 (L) 7 (L) 7 (L)   eGFR if African American      >60 mL/min/1.73 m:2 35 (A) 35 (A) 37.2 (A)   eGFR if non African American      >60 mL/min/1.73 m:2 30 (A) 30 (A) 32.2 (A)   Phosphorus      2.7 - 4.5 mg/dL   4.1   PTH      9.0 - 77.0 pg/mL   66.0   Uric Acid      3.4 - 7.0 mg/dL  6.4    Sed Rate      0 - 19 mm/Hr  10    CRP      0.00 - 0.90 mg/dL  <0.50        Assessment:       1. Psoriatic arthritis.  Prior differential for arthralgias: OA vs. RA vs PsA vs Polyarticular Gout; positive RF but in past but now negative normal ESR and CRP previously; erosions on X-ray right 1st MTP, suspicious for pencil in cup deformity in hands; Will classify as  psoriatic arthritis. Still with diffuse pain.  Right middle PIP very mobile  2. History of osteoarthrosis in multiple joints. Erosive OA on x-ray  3. Chondrocalinosis  4. Positive rheumatoid factor in past.  Now negative  5. Diabetes.  Not on any medications.  Tragenta caused glucose to drop  6. Schwanoma.  In past located outside of the lungs  7. Hyperuricemia  8.  Psoriasis diagnosed 2017  9.  Stage 3 renal disease  10. Cervical cord compression with myelopathy.    11.  SOB. On exertion.  On Lasix.  12.  Fatigue  13. Charcot joint both feet  14. Wound of right leg.    Plan:       1.  Patient stopped methotrexate 5 mg weekly due to pandemic.  Will continue to hold until pandemic resolves and no wounds.  Consider restarting if venous ablation of legs helps with blisters  2.  Would hold on Plaquenil since it could flare psoriasis  3.  Continue colchicine and allopurinol 100 mg daily.    4.  Patient to follow with primary/cardiology regarding elevated BP in evening.  5. Patient will see pain management he has seen in the past.  6. Follow with cardiology interventional for venous ablation in January 2021      RTO 3 months/prn    The patient location is: home  The chief complaint leading to consultation is: joint pain    Visit type: TELE AUDIOVISUAL:41836    Face to Face time with patient: 25 minutes  30 minutes of total time spent on the encounter, which includes face to face time and non-face to face time preparing to see the patient (eg, review of tests), Obtaining and/or reviewing separately obtained history, Documenting clinical information in the electronic or other health record, Independently interpreting results (not separately reported) and communicating results to the patient/family/caregiver, or Care coordination (not separately reported).         Each patient to whom he or she provides medical services by telemedicine is:  (1) informed of the relationship between the physician and patient and the respective  role of any other health care provider with respect to management of the patient; and (2) notified that he or she may decline to receive medical services by telemedicine and may withdraw from such care at any time.    Notes: see above

## 2020-12-07 NOTE — PROGRESS NOTES
Rapid3 Question Responses and Scores 12/7/2020   MDHAQ Score 1   Psychologic Score 1.1   Pain Score 8   When you awakened in the morning OVER THE LAST WEEK, did you feel stiff? Yes   If Yes, please indicate the number of hours until you are as limber as you will be for the day 1   Fatigue Score 4   Global Health Score 5   RAPID3 Score 5.44

## 2021-02-02 ENCOUNTER — PATIENT MESSAGE (OUTPATIENT)
Dept: RHEUMATOLOGY | Facility: CLINIC | Age: 76
End: 2021-02-02

## 2021-02-02 DIAGNOSIS — M10.9 GOUT, ARTHRITIS: Primary | ICD-10-CM

## 2021-02-04 RX ORDER — COLCHICINE 0.6 MG/1
0.6 CAPSULE ORAL 2 TIMES DAILY
Qty: 60 CAPSULE | Refills: 2 | Status: SHIPPED | OUTPATIENT
Start: 2021-02-04 | End: 2021-06-09

## 2021-02-23 ENCOUNTER — PATIENT MESSAGE (OUTPATIENT)
Dept: RHEUMATOLOGY | Facility: CLINIC | Age: 76
End: 2021-02-23

## 2021-03-12 PROBLEM — R73.9 HYPERGLYCEMIA: Status: ACTIVE | Noted: 2021-03-12

## 2021-03-13 PROBLEM — E11.00 TYPE II DIABETES MELLITUS WITH HYPEROSMOLARITY, UNCONTROLLED: Status: ACTIVE | Noted: 2021-03-13

## 2021-03-23 ENCOUNTER — OFFICE VISIT (OUTPATIENT)
Dept: RHEUMATOLOGY | Facility: CLINIC | Age: 76
End: 2021-03-23
Payer: MEDICARE

## 2021-03-23 DIAGNOSIS — M10.9 GOUT, ARTHRITIS: ICD-10-CM

## 2021-03-23 DIAGNOSIS — L40.50 PSORIATIC ARTHRITIS: Primary | ICD-10-CM

## 2021-03-23 DIAGNOSIS — M11.20 PSEUDOGOUT: ICD-10-CM

## 2021-03-23 PROCEDURE — 1159F PR MEDICATION LIST DOCUMENTED IN MEDICAL RECORD: ICD-10-PCS | Mod: 95,,, | Performed by: INTERNAL MEDICINE

## 2021-03-23 PROCEDURE — 1159F MED LIST DOCD IN RCRD: CPT | Mod: 95,,, | Performed by: INTERNAL MEDICINE

## 2021-03-23 PROCEDURE — 99214 OFFICE O/P EST MOD 30 MIN: CPT | Mod: 95,,, | Performed by: INTERNAL MEDICINE

## 2021-03-23 PROCEDURE — 99214 PR OFFICE/OUTPT VISIT, EST, LEVL IV, 30-39 MIN: ICD-10-PCS | Mod: 95,,, | Performed by: INTERNAL MEDICINE

## 2021-03-23 RX ORDER — FOLIC ACID 1 MG/1
1 TABLET ORAL DAILY
Qty: 30 TABLET | Refills: 2 | Status: SHIPPED | OUTPATIENT
Start: 2021-03-23 | End: 2021-09-30 | Stop reason: SDUPTHER

## 2021-03-23 RX ORDER — METHOTREXATE 2.5 MG/1
5 TABLET ORAL
Qty: 8 TABLET | Refills: 1 | Status: SHIPPED | OUTPATIENT
Start: 2021-03-23 | End: 2021-09-30 | Stop reason: SDUPTHER

## 2021-03-28 ENCOUNTER — PATIENT MESSAGE (OUTPATIENT)
Dept: RHEUMATOLOGY | Facility: CLINIC | Age: 76
End: 2021-03-28

## 2021-06-24 ENCOUNTER — OFFICE VISIT (OUTPATIENT)
Dept: RHEUMATOLOGY | Facility: CLINIC | Age: 76
End: 2021-06-24
Payer: MEDICARE

## 2021-06-24 VITALS
WEIGHT: 156.06 LBS | HEART RATE: 69 BPM | DIASTOLIC BLOOD PRESSURE: 81 MMHG | BODY MASS INDEX: 22.4 KG/M2 | SYSTOLIC BLOOD PRESSURE: 138 MMHG

## 2021-06-24 DIAGNOSIS — M10.9 GOUT, ARTHRITIS: ICD-10-CM

## 2021-06-24 DIAGNOSIS — M11.20 PSEUDOGOUT: ICD-10-CM

## 2021-06-24 DIAGNOSIS — N18.2 CKD (CHRONIC KIDNEY DISEASE), STAGE II: ICD-10-CM

## 2021-06-24 DIAGNOSIS — M15.9 PRIMARY OSTEOARTHRITIS INVOLVING MULTIPLE JOINTS: ICD-10-CM

## 2021-06-24 DIAGNOSIS — L40.50 PSORIATIC ARTHRITIS: Primary | ICD-10-CM

## 2021-06-24 DIAGNOSIS — R53.83 FATIGUE, UNSPECIFIED TYPE: ICD-10-CM

## 2021-06-24 DIAGNOSIS — R76.8 RHEUMATOID FACTOR POSITIVE: ICD-10-CM

## 2021-06-24 PROCEDURE — 3288F PR FALLS RISK ASSESSMENT DOCUMENTED: ICD-10-PCS | Mod: CPTII,S$GLB,, | Performed by: INTERNAL MEDICINE

## 2021-06-24 PROCEDURE — 3008F BODY MASS INDEX DOCD: CPT | Mod: CPTII,S$GLB,, | Performed by: INTERNAL MEDICINE

## 2021-06-24 PROCEDURE — 3008F PR BODY MASS INDEX (BMI) DOCUMENTED: ICD-10-PCS | Mod: CPTII,S$GLB,, | Performed by: INTERNAL MEDICINE

## 2021-06-24 PROCEDURE — 1100F PR PT FALLS ASSESS DOC 2+ FALLS/FALL W/INJURY/YR: ICD-10-PCS | Mod: CPTII,S$GLB,, | Performed by: INTERNAL MEDICINE

## 2021-06-24 PROCEDURE — 1125F PR PAIN SEVERITY QUANTIFIED, PAIN PRESENT: ICD-10-PCS | Mod: S$GLB,,, | Performed by: INTERNAL MEDICINE

## 2021-06-24 PROCEDURE — 3288F FALL RISK ASSESSMENT DOCD: CPT | Mod: CPTII,S$GLB,, | Performed by: INTERNAL MEDICINE

## 2021-06-24 PROCEDURE — 99999 PR PBB SHADOW E&M-EST. PATIENT-LVL II: ICD-10-PCS | Mod: PBBFAC,,, | Performed by: INTERNAL MEDICINE

## 2021-06-24 PROCEDURE — 1125F AMNT PAIN NOTED PAIN PRSNT: CPT | Mod: S$GLB,,, | Performed by: INTERNAL MEDICINE

## 2021-06-24 PROCEDURE — 1100F PTFALLS ASSESS-DOCD GE2>/YR: CPT | Mod: CPTII,S$GLB,, | Performed by: INTERNAL MEDICINE

## 2021-06-24 PROCEDURE — 99999 PR PBB SHADOW E&M-EST. PATIENT-LVL II: CPT | Mod: PBBFAC,,, | Performed by: INTERNAL MEDICINE

## 2021-06-24 PROCEDURE — 99214 OFFICE O/P EST MOD 30 MIN: CPT | Mod: S$GLB,,, | Performed by: INTERNAL MEDICINE

## 2021-06-24 PROCEDURE — 1159F MED LIST DOCD IN RCRD: CPT | Mod: S$GLB,,, | Performed by: INTERNAL MEDICINE

## 2021-06-24 PROCEDURE — 1159F PR MEDICATION LIST DOCUMENTED IN MEDICAL RECORD: ICD-10-PCS | Mod: S$GLB,,, | Performed by: INTERNAL MEDICINE

## 2021-06-24 PROCEDURE — 99214 PR OFFICE/OUTPT VISIT, EST, LEVL IV, 30-39 MIN: ICD-10-PCS | Mod: S$GLB,,, | Performed by: INTERNAL MEDICINE

## 2021-06-24 ASSESSMENT — ROUTINE ASSESSMENT OF PATIENT INDEX DATA (RAPID3)
PSYCHOLOGICAL DISTRESS SCORE: 0
TOTAL RAPID3 SCORE: 4.78
MDHAQ FUNCTION SCORE: 1
PATIENT GLOBAL ASSESSMENT SCORE: 4
PAIN SCORE: 7
FATIGUE SCORE: 3

## 2021-07-25 ENCOUNTER — PATIENT MESSAGE (OUTPATIENT)
Dept: RHEUMATOLOGY | Facility: CLINIC | Age: 76
End: 2021-07-25

## 2021-08-09 PROBLEM — N18.32 CHRONIC KIDNEY DISEASE (CKD) STAGE G3B/A3, MODERATELY DECREASED GLOMERULAR FILTRATION RATE (GFR) BETWEEN 30-44 ML/MIN/1.73 SQUARE METER AND ALBUMINURIA CREATININE RATIO GREATER THAN 300 MG/G: Status: ACTIVE | Noted: 2021-08-09

## 2021-09-30 ENCOUNTER — OFFICE VISIT (OUTPATIENT)
Dept: RHEUMATOLOGY | Facility: CLINIC | Age: 76
End: 2021-09-30
Payer: MEDICARE

## 2021-09-30 DIAGNOSIS — M10.9 GOUT, ARTHRITIS: ICD-10-CM

## 2021-09-30 DIAGNOSIS — M11.20 PSEUDOGOUT: ICD-10-CM

## 2021-09-30 DIAGNOSIS — L40.50 PSORIATIC ARTHRITIS: Primary | ICD-10-CM

## 2021-09-30 DIAGNOSIS — D84.9 IMMUNOSUPPRESSION: ICD-10-CM

## 2021-09-30 PROCEDURE — 1159F PR MEDICATION LIST DOCUMENTED IN MEDICAL RECORD: ICD-10-PCS | Mod: CPTII,95,, | Performed by: INTERNAL MEDICINE

## 2021-09-30 PROCEDURE — 1160F PR REVIEW ALL MEDS BY PRESCRIBER/CLIN PHARMACIST DOCUMENTED: ICD-10-PCS | Mod: CPTII,95,, | Performed by: INTERNAL MEDICINE

## 2021-09-30 PROCEDURE — 3044F PR MOST RECENT HEMOGLOBIN A1C LEVEL <7.0%: ICD-10-PCS | Mod: CPTII,95,, | Performed by: INTERNAL MEDICINE

## 2021-09-30 PROCEDURE — 1159F MED LIST DOCD IN RCRD: CPT | Mod: CPTII,95,, | Performed by: INTERNAL MEDICINE

## 2021-09-30 PROCEDURE — 99214 PR OFFICE/OUTPT VISIT, EST, LEVL IV, 30-39 MIN: ICD-10-PCS | Mod: 95,,, | Performed by: INTERNAL MEDICINE

## 2021-09-30 PROCEDURE — 1160F RVW MEDS BY RX/DR IN RCRD: CPT | Mod: CPTII,95,, | Performed by: INTERNAL MEDICINE

## 2021-09-30 PROCEDURE — 3044F HG A1C LEVEL LT 7.0%: CPT | Mod: CPTII,95,, | Performed by: INTERNAL MEDICINE

## 2021-09-30 PROCEDURE — 99214 OFFICE O/P EST MOD 30 MIN: CPT | Mod: 95,,, | Performed by: INTERNAL MEDICINE

## 2021-09-30 RX ORDER — METHOTREXATE 2.5 MG/1
5 TABLET ORAL
Qty: 8 TABLET | Refills: 2 | Status: SHIPPED | OUTPATIENT
Start: 2021-09-30 | End: 2022-02-12 | Stop reason: CLARIF

## 2021-09-30 RX ORDER — FOLIC ACID 1 MG/1
1 TABLET ORAL DAILY
Qty: 30 TABLET | Refills: 2 | Status: SHIPPED | OUTPATIENT
Start: 2021-09-30 | End: 2022-02-12 | Stop reason: CLARIF

## 2021-10-20 ENCOUNTER — PATIENT MESSAGE (OUTPATIENT)
Dept: RHEUMATOLOGY | Facility: CLINIC | Age: 76
End: 2021-10-20
Payer: MEDICARE

## 2021-12-09 DIAGNOSIS — M10.9 GOUT, ARTHRITIS: ICD-10-CM

## 2021-12-09 DIAGNOSIS — R53.83 FATIGUE, UNSPECIFIED TYPE: ICD-10-CM

## 2021-12-09 DIAGNOSIS — R76.8 RHEUMATOID FACTOR POSITIVE: ICD-10-CM

## 2021-12-09 DIAGNOSIS — L40.50 PSORIATIC ARTHRITIS: Primary | ICD-10-CM

## 2021-12-09 DIAGNOSIS — M15.9 PRIMARY OSTEOARTHRITIS INVOLVING MULTIPLE JOINTS: ICD-10-CM

## 2021-12-09 DIAGNOSIS — D84.9 IMMUNOSUPPRESSION: ICD-10-CM

## 2021-12-09 DIAGNOSIS — N18.2 CKD (CHRONIC KIDNEY DISEASE), STAGE II: ICD-10-CM

## 2021-12-09 RX ORDER — COLCHICINE 0.6 MG/1
CAPSULE ORAL
Qty: 60 CAPSULE | Refills: 2 | Status: SHIPPED | OUTPATIENT
Start: 2021-12-09 | End: 2022-03-26

## 2021-12-10 ENCOUNTER — TELEPHONE (OUTPATIENT)
Dept: RHEUMATOLOGY | Facility: CLINIC | Age: 76
End: 2021-12-10
Payer: MEDICARE

## 2022-01-18 ENCOUNTER — PATIENT MESSAGE (OUTPATIENT)
Dept: RHEUMATOLOGY | Facility: CLINIC | Age: 77
End: 2022-01-18
Payer: MEDICARE

## 2022-01-20 PROBLEM — D69.6 THROMBOCYTOPENIA, UNSPECIFIED: Status: ACTIVE | Noted: 2022-01-20

## 2022-01-20 PROBLEM — N18.4 CHRONIC KIDNEY DISEASE, STAGE IV (SEVERE): Status: ACTIVE | Noted: 2022-01-20

## 2022-01-20 PROBLEM — I20.9 ANGINA PECTORIS: Status: ACTIVE | Noted: 2022-01-20

## 2022-01-20 PROBLEM — D84.9 IMMUNOSUPPRESSION: Status: ACTIVE | Noted: 2022-01-20

## 2022-01-26 PROBLEM — U07.1 LAB TEST POSITIVE FOR DETECTION OF COVID-19 VIRUS: Status: ACTIVE | Noted: 2022-01-26

## 2022-01-26 PROBLEM — Z71.89 ACP (ADVANCE CARE PLANNING): Status: ACTIVE | Noted: 2022-01-26

## 2022-01-26 PROBLEM — L03.116 CELLULITIS OF LEFT LOWER EXTREMITY: Status: ACTIVE | Noted: 2022-01-26

## 2022-02-13 ENCOUNTER — PATIENT MESSAGE (OUTPATIENT)
Dept: RHEUMATOLOGY | Facility: CLINIC | Age: 77
End: 2022-02-13
Payer: MEDICARE

## 2022-02-15 PROBLEM — D61.818 PANCYTOPENIA: Status: ACTIVE | Noted: 2022-02-15

## 2022-02-18 PROBLEM — A41.9 SEPSIS: Status: ACTIVE | Noted: 2022-02-18

## 2022-02-21 ENCOUNTER — TELEPHONE (OUTPATIENT)
Dept: INFECTIOUS DISEASES | Facility: CLINIC | Age: 77
End: 2022-02-21
Payer: MEDICARE

## 2022-02-21 NOTE — TELEPHONE ENCOUNTER
----- Message from Shira Montilla sent at 2/21/2022  1:47 PM CST -----  Regarding: advice  Contact: Wife/Janene/[phone]  Type: Needs Medical Advice  Who Called:  Wife/Janene/[phone]    Additional Information: Has an appointment on 3/8/22 but she would like to get a later appointment on that date if at all possible. Please call to advise. Thanks, Syvonne!

## 2022-02-21 NOTE — TELEPHONE ENCOUNTER
----- Message from Shira Montilla sent at 2/21/2022  1:47 PM CST -----  Regarding: advice  Contact: Wife/Janene/[phone]  Type: Needs Medical Advice  Who Called:  Wife/Janene/[phone]    Additional Information: Has an appointment on 3/8/22 but she would like to get a later appointment on that date if at all possible. Please call to advise. Thanks, Syvonne!

## 2022-02-22 ENCOUNTER — PATIENT MESSAGE (OUTPATIENT)
Dept: INFECTIOUS DISEASES | Facility: CLINIC | Age: 77
End: 2022-02-22
Payer: MEDICARE

## 2022-02-22 NOTE — TELEPHONE ENCOUNTER
Returned call, No answer. Left message advising of Edel's message and instructed Kimberly to either return my call or send a chart message if she wants to move the appt to 10:30.

## 2022-02-28 ENCOUNTER — OFFICE VISIT (OUTPATIENT)
Dept: ORTHOPEDICS | Facility: CLINIC | Age: 77
End: 2022-02-28
Payer: MEDICARE

## 2022-02-28 VITALS — HEIGHT: 67 IN | WEIGHT: 158.5 LBS | BODY MASS INDEX: 24.88 KG/M2

## 2022-02-28 DIAGNOSIS — Z98.1 ARTHRODESIS STATUS: Primary | ICD-10-CM

## 2022-02-28 PROCEDURE — 3060F PR POS MICROALBUMINURIA RESULT DOCUMENTED/REVIEW: ICD-10-PCS | Mod: CPTII,S$GLB,, | Performed by: ORTHOPAEDIC SURGERY

## 2022-02-28 PROCEDURE — 3008F BODY MASS INDEX DOCD: CPT | Mod: CPTII,S$GLB,, | Performed by: ORTHOPAEDIC SURGERY

## 2022-02-28 PROCEDURE — 1125F AMNT PAIN NOTED PAIN PRSNT: CPT | Mod: CPTII,S$GLB,, | Performed by: ORTHOPAEDIC SURGERY

## 2022-02-28 PROCEDURE — 3008F PR BODY MASS INDEX (BMI) DOCUMENTED: ICD-10-PCS | Mod: CPTII,S$GLB,, | Performed by: ORTHOPAEDIC SURGERY

## 2022-02-28 PROCEDURE — 1111F DSCHRG MED/CURRENT MED MERGE: CPT | Mod: CPTII,S$GLB,, | Performed by: ORTHOPAEDIC SURGERY

## 2022-02-28 PROCEDURE — 1111F PR DISCHARGE MEDS RECONCILED W/ CURRENT OUTPATIENT MED LIST: ICD-10-PCS | Mod: CPTII,S$GLB,, | Performed by: ORTHOPAEDIC SURGERY

## 2022-02-28 PROCEDURE — 3051F PR MOST RECENT HEMOGLOBIN A1C LEVEL 7.0 - < 8.0%: ICD-10-PCS | Mod: CPTII,S$GLB,, | Performed by: ORTHOPAEDIC SURGERY

## 2022-02-28 PROCEDURE — 1101F PR PT FALLS ASSESS DOC 0-1 FALLS W/OUT INJ PAST YR: ICD-10-PCS | Mod: CPTII,S$GLB,, | Performed by: ORTHOPAEDIC SURGERY

## 2022-02-28 PROCEDURE — 99999 PR PBB SHADOW E&M-EST. PATIENT-LVL IV: CPT | Mod: PBBFAC,,, | Performed by: ORTHOPAEDIC SURGERY

## 2022-02-28 PROCEDURE — 3288F FALL RISK ASSESSMENT DOCD: CPT | Mod: CPTII,S$GLB,, | Performed by: ORTHOPAEDIC SURGERY

## 2022-02-28 PROCEDURE — 3066F PR DOCUMENTATION OF TREATMENT FOR NEPHROPATHY: ICD-10-PCS | Mod: CPTII,S$GLB,, | Performed by: ORTHOPAEDIC SURGERY

## 2022-02-28 PROCEDURE — 3288F PR FALLS RISK ASSESSMENT DOCUMENTED: ICD-10-PCS | Mod: CPTII,S$GLB,, | Performed by: ORTHOPAEDIC SURGERY

## 2022-02-28 PROCEDURE — 3060F POS MICROALBUMINURIA REV: CPT | Mod: CPTII,S$GLB,, | Performed by: ORTHOPAEDIC SURGERY

## 2022-02-28 PROCEDURE — 1101F PT FALLS ASSESS-DOCD LE1/YR: CPT | Mod: CPTII,S$GLB,, | Performed by: ORTHOPAEDIC SURGERY

## 2022-02-28 PROCEDURE — 1159F PR MEDICATION LIST DOCUMENTED IN MEDICAL RECORD: ICD-10-PCS | Mod: CPTII,S$GLB,, | Performed by: ORTHOPAEDIC SURGERY

## 2022-02-28 PROCEDURE — 99213 PR OFFICE/OUTPT VISIT, EST, LEVL III, 20-29 MIN: ICD-10-PCS | Mod: S$GLB,,, | Performed by: ORTHOPAEDIC SURGERY

## 2022-02-28 PROCEDURE — 99999 PR PBB SHADOW E&M-EST. PATIENT-LVL IV: ICD-10-PCS | Mod: PBBFAC,,, | Performed by: ORTHOPAEDIC SURGERY

## 2022-02-28 PROCEDURE — 3066F NEPHROPATHY DOC TX: CPT | Mod: CPTII,S$GLB,, | Performed by: ORTHOPAEDIC SURGERY

## 2022-02-28 PROCEDURE — 1159F MED LIST DOCD IN RCRD: CPT | Mod: CPTII,S$GLB,, | Performed by: ORTHOPAEDIC SURGERY

## 2022-02-28 PROCEDURE — 99213 OFFICE O/P EST LOW 20 MIN: CPT | Mod: S$GLB,,, | Performed by: ORTHOPAEDIC SURGERY

## 2022-02-28 PROCEDURE — 3051F HG A1C>EQUAL 7.0%<8.0%: CPT | Mod: CPTII,S$GLB,, | Performed by: ORTHOPAEDIC SURGERY

## 2022-02-28 PROCEDURE — 1160F RVW MEDS BY RX/DR IN RCRD: CPT | Mod: CPTII,S$GLB,, | Performed by: ORTHOPAEDIC SURGERY

## 2022-02-28 PROCEDURE — 1160F PR REVIEW ALL MEDS BY PRESCRIBER/CLIN PHARMACIST DOCUMENTED: ICD-10-PCS | Mod: CPTII,S$GLB,, | Performed by: ORTHOPAEDIC SURGERY

## 2022-02-28 PROCEDURE — 4010F ACE/ARB THERAPY RXD/TAKEN: CPT | Mod: CPTII,S$GLB,, | Performed by: ORTHOPAEDIC SURGERY

## 2022-02-28 PROCEDURE — 4010F PR ACE/ARB THEARPY RXD/TAKEN: ICD-10-PCS | Mod: CPTII,S$GLB,, | Performed by: ORTHOPAEDIC SURGERY

## 2022-02-28 PROCEDURE — 1125F PR PAIN SEVERITY QUANTIFIED, PAIN PRESENT: ICD-10-PCS | Mod: CPTII,S$GLB,, | Performed by: ORTHOPAEDIC SURGERY

## 2022-02-28 NOTE — PROGRESS NOTES
Rojelio Dye Sr.      Patient is referred for evaluation of previous surgical fusions of his feet.  This is a 76-year-old male with multiple musculoskeletal issues and medical comorbidities who had presented to me about 8 years ago with bilateral severe hindfoot deformities in arthritis.  I performed a triple arthrodesis of his left foot in April 2014 and his right foot in December 2015. Despite my attempts at triple arthrodesis ease he still had significant deformities of both feet and has had to be manage with shoe modifications and orthotics.  I last saw him in March 2018. More recently over the last couple months he has had significant swelling, pain, and cellulitis both lower extremities and is currently on home IV antibiotics until the end of March.  He reports that his pain and swelling have significantly improved.  He does not really have any pain at this time of the right foot he has some continued pain mainly in the back of the left heel and ankle.  He is not reporting any symptoms of infection at this time.    Examination:  He comes in today using a walker.  Inspection of his feet reveals continued severe pes planovalgus deformities worse on the right than on the left.  There is mild swelling of both lower extremities at this moment.  There is no significant erythema at this time.  On sitting exam his feet and ankles are cool to touch.  There are no open wounds.  There is some mild pitting edema on the dorsum of his left foot  He is tender posteriorly around the Achilles tendon on the left side.  Distal pulses are difficult to feel due to pitting edema but he has good capillary refill in his toes.    Imaging:  I reviewed recent MRI and x-rays of his left foot and compared them to x-rays from several years ago.  The MRI does not suggest any obvious bone marrow edema to suggest osteomyelitis although there is some distortion from the hardware.  There are no significant changes of the Achilles tendon on  MRI.  There are no obvious collections of fluid.  Plain x-rays reveal evidence of his previous fusion surgery.  There are 4 screws, 2 of which are broken from his previous triple fusion.  In comparison to the x-rays of his left foot from several years ago the  fusions sites are more solid now than they were at that time.  The 2 screws that are broken now were broken 6 years ago.  There are no osteolytic changes of bone to suggest osteomyelitis.    Impression:  History of previous triple arthrodesis with retained hardware                       Recurrent cellulitis left lower extremity    Recommendation:  I do not believe that the hardware in the left foot is contributing to his current cellulitic problems.  Radiographs and MRI did not suggest an osteomyelitic process.  I would not recommend any surgical intervention to attempt to remove hardware at this time.  I believe he should complete his course of IV antibiotics.      Follow-up as needed

## 2022-03-08 ENCOUNTER — OFFICE VISIT (OUTPATIENT)
Dept: INFECTIOUS DISEASES | Facility: CLINIC | Age: 77
End: 2022-03-08
Payer: MEDICARE

## 2022-03-08 DIAGNOSIS — I89.0 LYMPHEDEMA OF BOTH LOWER EXTREMITIES: ICD-10-CM

## 2022-03-08 DIAGNOSIS — L03.116 CELLULITIS OF LEFT LOWER EXTREMITY: Primary | ICD-10-CM

## 2022-03-08 DIAGNOSIS — B35.1 ONYCHOMYCOSIS OF FOOT WITH OTHER COMPLICATION: ICD-10-CM

## 2022-03-08 PROCEDURE — 3051F HG A1C>EQUAL 7.0%<8.0%: CPT | Mod: CPTII,S$GLB,, | Performed by: PHYSICIAN ASSISTANT

## 2022-03-08 PROCEDURE — 3066F NEPHROPATHY DOC TX: CPT | Mod: CPTII,S$GLB,, | Performed by: PHYSICIAN ASSISTANT

## 2022-03-08 PROCEDURE — 3051F PR MOST RECENT HEMOGLOBIN A1C LEVEL 7.0 - < 8.0%: ICD-10-PCS | Mod: CPTII,S$GLB,, | Performed by: PHYSICIAN ASSISTANT

## 2022-03-08 PROCEDURE — 4010F ACE/ARB THERAPY RXD/TAKEN: CPT | Mod: CPTII,S$GLB,, | Performed by: PHYSICIAN ASSISTANT

## 2022-03-08 PROCEDURE — 99999 PR PBB SHADOW E&M-EST. PATIENT-LVL II: ICD-10-PCS | Mod: PBBFAC,,, | Performed by: PHYSICIAN ASSISTANT

## 2022-03-08 PROCEDURE — 3060F PR POS MICROALBUMINURIA RESULT DOCUMENTED/REVIEW: ICD-10-PCS | Mod: CPTII,S$GLB,, | Performed by: PHYSICIAN ASSISTANT

## 2022-03-08 PROCEDURE — 99215 OFFICE O/P EST HI 40 MIN: CPT | Mod: S$GLB,,, | Performed by: PHYSICIAN ASSISTANT

## 2022-03-08 PROCEDURE — 99215 PR OFFICE/OUTPT VISIT, EST, LEVL V, 40-54 MIN: ICD-10-PCS | Mod: S$GLB,,, | Performed by: PHYSICIAN ASSISTANT

## 2022-03-08 PROCEDURE — 3060F POS MICROALBUMINURIA REV: CPT | Mod: CPTII,S$GLB,, | Performed by: PHYSICIAN ASSISTANT

## 2022-03-08 PROCEDURE — 1111F DSCHRG MED/CURRENT MED MERGE: CPT | Mod: CPTII,S$GLB,, | Performed by: PHYSICIAN ASSISTANT

## 2022-03-08 PROCEDURE — 4010F PR ACE/ARB THEARPY RXD/TAKEN: ICD-10-PCS | Mod: CPTII,S$GLB,, | Performed by: PHYSICIAN ASSISTANT

## 2022-03-08 PROCEDURE — 1111F PR DISCHARGE MEDS RECONCILED W/ CURRENT OUTPATIENT MED LIST: ICD-10-PCS | Mod: CPTII,S$GLB,, | Performed by: PHYSICIAN ASSISTANT

## 2022-03-08 PROCEDURE — 3066F PR DOCUMENTATION OF TREATMENT FOR NEPHROPATHY: ICD-10-PCS | Mod: CPTII,S$GLB,, | Performed by: PHYSICIAN ASSISTANT

## 2022-03-08 PROCEDURE — 99999 PR PBB SHADOW E&M-EST. PATIENT-LVL II: CPT | Mod: PBBFAC,,, | Performed by: PHYSICIAN ASSISTANT

## 2022-03-08 RX ORDER — FLUCONAZOLE 150 MG/1
150 TABLET ORAL WEEKLY
Qty: 12 TABLET | Refills: 3 | Status: SHIPPED | OUTPATIENT
Start: 2022-03-08 | End: 2022-08-08

## 2022-03-09 NOTE — PROGRESS NOTES
Patient ID: Rojelio Dye Sr. is a 76 y.o. male.    Subjective:           Chief Complaint: Recurrent Skin Infections      HPI  75yo male male with a PMH of GERD, hypertension, BPH, CKD, OA/RA/gouty arthritis, venous insufficiency s/p ablations, Charcot foot s/p bilateral triple arthrodesis, and recent left lower extremity cellulitis admitted to [facility] on 2/12/22 for recurrent left leg cellulitis     - 12/16/21 - received steroid injections from Dr. Espinal of his bilateral knees, L hip, and L shoulder pain secondary to degenerative arthritis   - 1/1/22 - diagnosed with COVID19  - began having swelling to his BLEs and difficulty conrolling BP. Saw his cardiologist who adjusted BP meds   - 1/17/22 - sustained a fall with minor abrasion wounds including knees. Continued swelling with weeping to BLEs  - 1/20/22 - saw Ortho Dr. Espinal for supposed patella fracture, but told it was just ligament damage. Prescribed clindamycin 300mg po TID but cellulitis did not improve  - 1/25/22 to 1/28/22 hospitalized at [facility] for LLE cellulitis. Improved on IV Vanc/Zosyn then Ancef. Discharged home on PO ceftin x7 days [1/29-2/5] and reports resolution of symptoms  - 2/9/22 had recurrence of swelling and erythema  - 2/10/22 resumed on IV Ancef as outpatient by his PCP Dr. Grajeda but swelling/erythema/pain continued  - 2/12/22 readmitted for recurrent cellulitis. Started again on IV Vanc/Zosyn. Cellulitis improved.  - 2/14 CT left foot WO contrast - no evidence of osteo or abscess  - 2/16 MRI: Postoperative changes of calcaneocuboid, talocalcaneal, and talonavicular arthrodesis are seen with hardware susceptibility artifact limiting regional assessment.  No definite suspicious bone marrow edema suggestive of acute fracture or osteomyelitis is appreciated. There appears to be fluid signal intensity about the extensor digitorum tendons suggestive of tenosynovitis, possibly inflammatory or infectious in nature. There appears to be diffuse  intramuscular edema which may reflect nonspecific myositis. There is subcutaneous soft tissue edema about the foot and ankle which may reflect cellulitis in the setting of infection.  No loculated and drainable fluid collections are appreciated by noncontrast MRI.   - Discussion was had with patient and family, and given the recurrent nature and prolonged time to improvement on IV antibiotics, it was decided to treat him with prolonged antibiotic course despite negative imaging  - Final recs by Dr. Singh/Wendie Rodriguez: 6 weeks of IV antibiotics - Rocephin 2 grams IV qday and IV Daptomycin 4mg/kg (EOC 3/26/2022)    Interval HPI:  - patient here with his wife  - reports he has overall been doing well and left leg/foot swelling has improved, but today it does look a bit more red and swollen than in the last couple days  - right leg is also more swollen today, too. He has not been taking his lasix, but resumed it today. Has f/u with Cardiology tomorrow for known venous insufficiency  - denies fevers, chills, night sweats  - compliant with IV CTX + Dapto without side effects  - has been ambulating easier  - just noticed some red lesions to his bilateral knees today. No known trauma. They do not itch    Past Medical History:   Diagnosis Date    Acid reflux     Acquired hammer toe of right foot 8/29/2014    Arthritis of foot 12/9/2015    Benign essential hypertension 4/23/2014    BPH with urinary obstruction 12/10/2015    CKD (chronic kidney disease), stage II 4/23/2014    Followed by Dr. Rodriguez Brandt    Diabetic Charcot foot     Encounter for blood transfusion     HAV (hallux abducto valgus)     Hypercholesterolemia without hypertriglyceridemia 7/17/2004    Inflammatory osteoarthritis     Mild intermittent asthma without complication 4/23/2014    Narcolepsy     ADRIANNA on CPAP 4/23/2014    Osteoarthrosis involving, or with mention of more than one site, but not specified as generalized, multiple sites 12/28/2011     Pancytopenia 2/15/2022    Pericarditis     Pes planovalgus 5/5/2014    Pre-diabetes 4/23/2014    RLS (restless legs syndrome)     Schwannoma     T vert body       Past Surgical History:   Procedure Laterality Date    ADENOIDECTOMY      COCHLEAR IMPLANT REVISION      DECOMPRESSION OF CERVICAL SPINE BY ANTERIOR APPROACH WITH FUSION N/A 12/22/2018    Procedure: DECOMPRESSION AND FUSION, SPINE, CERVICAL, ANTERIOR APPROACH, C3-C5;  Surgeon: Gian Mao MD;  Location: [facility] OR;  Service: Neurosurgery;  Laterality: N/A;    FOOT SURGERY  4-22-14    left    FUSION OF CERVICAL SPINE BY POSTERIOR APPROACH N/A 12/22/2018    Procedure: POSTERIOR CERVICAL FUSION WITH Segmented intrumentation LAMINECTOMY, C3-C5;  Surgeon: Gian Mao MD;  Location: [facility] OR;  Service: Neurosurgery;  Laterality: N/A;    HERNIA REPAIR      PROSTATE SURGERY      TONSILLECTOMY      VASECTOMY         Review of patient's allergies indicates:   Allergen Reactions    Sulfa (sulfonamide antibiotics) Hives    Sulfamethoxazole-trimethoprim Rash    Mobic [meloxicam] Hives       Family History   Problem Relation Age of Onset    Osteoporosis Mother     Heart failure Mother     Heart disease Father     Rheum arthritis Neg Hx     Lupus Neg Hx        Social History     Socioeconomic History    Marital status:    Tobacco Use    Smoking status: Never Smoker    Smokeless tobacco: Never Used    Tobacco comment: 2nd Hand -Father smoked 3 packs a day   Substance and Sexual Activity    Alcohol use: Not Currently     Comment: occ    Drug use: No    Sexual activity: Not Currently     Partners: Female     Birth control/protection: None       Current Outpatient Medications   Medication Instructions    acetaminophen (TYLENOL) 650 mg, Oral, 2 times daily    albuterol (VENTOLIN HFA) 90 mcg/actuation inhaler 2 puffs, Inhalation, Every 6 hours PRN, Rescue    allopurinoL (ZYLOPRIM) 100 MG tablet TAKE 1 TABLET EVERY  MORNING    aspirin 81 mg, Oral, Every other day    blood-glucose meter kit To check BG 4 times daily, to use with insurance preferred meter    bumetanide (BUMEX) 1 mg, Oral, Daily    calcitRIOL (ROCALTROL) 0.25 mcg, Oral, Weekly    carvediloL (COREG) 25 MG tablet TAKE 1 TABLET BY MOUTH TWICE DAILY FOR HEART / BLOOD PRESSURE    cefTRIAXone (ROCEPHIN) 2 g, Intravenous, Daily    ceftriaxone sodium (CEFTRIAXONE IV) 2 g, Intravenous, Daily, Ceftriaxone 2gm/20ml NS by Slow IV Push over 5 minutes once daily    cholecalciferol (vitamin D3) 5,000 Units, Oral, Daily    cloNIDine (CATAPRES) 0.1 mg, Oral, Every 4 hours PRN    cloNIDine (CATAPRES) 0.1 mg, Oral, With breakfast    cloNIDine (CATAPRES) 0.2 mg, Oral, With dinner    DAPTOMYCIN  mg, intravenous push, Daily, Daptomycin 260mg/20ml NS by slow IVP over 5min once daily    doxazosin (CARDURA) 4 mg, Oral, Daily    DULoxetine (CYMBALTA) 30 mg, Oral, Daily    famotidine (PEPCID) 20 mg, Oral, 2 times daily    fluconazole (DIFLUCAN) 150 mg, Oral, Weekly    furosemide (LASIX) 40 mg, Oral, Daily    gabapentin (NEURONTIN) 300 mg, Oral, 3 times daily    glucosamine-chondroitin 500-400 mg tablet 1 tablet, Oral, 2 times daily    heparin flush, porcine, (HEPARIN FLUSH 10 UNITS/ML) 10 unit/mL injection 5 mLs, Intravenous, See admin instructions, Skilled Nurse or may instruct patient/caregiver to Flush each port of PICC line with 5ml of Heparin daily after IV antibiotic complete, and nurse to flush PICC port as needed after final NS flush after blood draws.    hydroCHLOROthiazide (HYDRODIURIL) 25 mg, Oral, Daily    insulin aspart U-100 (NOVOLOG) 1-10 Units, Subcutaneous, 4 times daily with meals & nightly, blood sugar <150 no insulin<BR>151-200, give 2 units<BR>201-250, give 4 units<BR>251-300, give 6 units<BR>301-350, give 8 units<BR>351-400, give 10 units<BR>> 400 , give 12 units    LEVEMIR FLEXTOUCH U-100 INSULN 26 Units, Subcutaneous, Daily    losartan  "(COZAAR) 25 MG tablet TAKE 1/2 TABLET BY MOUTH EVERY EVENING    losartan (COZAAR) 25 mg, Oral, Daily    menthol/camphor/irr cntr-irtn1 (NICOLA NATURAL PAIN RELIEVING TOP) 1 application, Topical (Top), 3 times daily    MITIGARE 0.6 mg Cap TAKE 1 CAPSULE (0.6 MG TOTAL) BY MOUTH 2 TIMES A DAY AS DIRECTED    multivitamin with minerals tablet 1 tablet, Oral, Daily    oxybutynin (DITROPAN) 5 mg, Oral, 3 times daily    oxyCODONE-acetaminophen (PERCOCET)  mg per tablet 1 tablet, Oral, Every 4 hours PRN    pen needle, diabetic 32 gauge x 5/32" Ndle USE WITH INSULIN PENS    rOPINIRole (REQUIP) 3 MG tablet TAKE 1 TABLET BY MOUTH AT BEDTIME    sildenafiL (VIAGRA) 100 mg, Oral, As needed (PRN)    simvastatin (ZOCOR) 40 MG tablet TAKE 1 TABLET BY MOUTH AT BEDTIME FOR CHOLESTEROL    sodium chloride 0.9% (NORMAL SALINE FLUSH) injection 10 mLs, Intravenous, See admin instructions, IV FLUSH: PER PICC<BR>Sn to perform/ may instruct patient/ caregiver to flush PICC before/ after antibiotic/medication with 10 ml 0.9% Normal Saline followed by 5 ml of Heparin (10 units/ml).  Flush unused lumens daily with 10ml 0.9% Normal saline followed by 5 ml of Heparin ( 10 units/ml); Change all swab caps after each infusion and blood draw and/or every 24 hours.  <BR><BR>May Omit Heparin IV flushes for Neutropenic patients or as directed by MD.    tamsulosin (FLOMAX) 0.4 mg, Oral, Nightly    TRUE METRIX GLUCOSE TEST STRIP Strp USE TO CHECK BLOOD GLUCOSE 4 TIMES A DAY    TRUEPLUS LANCETS 30 gauge Misc USE TO TEST BLOOD SUGAR 4 TIMES A DAY         Review of Systems   Constitutional: Negative for activity change, appetite change, chills, fatigue and fever.   HENT: Negative for congestion, mouth sores, sinus pain and sore throat.    Eyes: Negative for photophobia and visual disturbance.   Respiratory: Negative for cough, chest tightness, shortness of breath and wheezing.    Cardiovascular: Positive for leg swelling. Negative for chest " pain and palpitations.   Gastrointestinal: Negative for abdominal pain, diarrhea, nausea and vomiting.   Genitourinary: Negative for dysuria, flank pain, hematuria and urgency.   Musculoskeletal: Positive for joint swelling. Negative for arthralgias, myalgias, neck pain and neck stiffness.   Skin: Positive for color change and rash.   Allergic/Immunologic: Negative for immunocompromised state.   Neurological: Negative for dizziness, seizures and headaches.   Psychiatric/Behavioral: Negative for confusion.           Objective:     There were no vitals filed for this visit.    There is no height or weight on file to calculate BMI.         Physical Exam  Vitals and nursing note reviewed.   Constitutional:       General: He is awake. He is not in acute distress.     Appearance: He is well-developed and well-groomed. He is not ill-appearing or diaphoretic.   HENT:      Head: Normocephalic and atraumatic.      Right Ear: External ear normal.      Left Ear: External ear normal.      Nose: Nose normal.   Eyes:      General: No scleral icterus.        Right eye: No discharge.         Left eye: No discharge.      Pupils: Pupils are equal, round, and reactive to light.   Cardiovascular:      Rate and Rhythm: Normal rate and regular rhythm.      Heart sounds: Normal heart sounds. No murmur heard.  Pulmonary:      Effort: Pulmonary effort is normal. No accessory muscle usage or respiratory distress.      Breath sounds: Normal breath sounds.   Abdominal:      General: Bowel sounds are normal. There is no distension.      Palpations: Abdomen is soft.      Tenderness: There is no abdominal tenderness. There is no guarding.   Musculoskeletal:      Cervical back: Normal range of motion and neck supple.      Right foot: Deformity and Charcot foot present.      Left foot: Deformity and Charcot foot present.   Feet:      Right foot:      Skin integrity: Dry skin present. No ulcer, erythema or warmth.      Toenail Condition: Right  toenails are abnormally thick. Fungal disease present.     Left foot:      Skin integrity: Erythema and dry skin present. No ulcer or warmth.      Toenail Condition: Left toenails are abnormally thick. Fungal disease present.     Comments: Pitting edema to BLEs up to the knee  Lymphadenopathy:      Cervical: No cervical adenopathy.   Skin:     General: Skin is warm and dry.      Findings: Rash present. Rash is macular.      Comments: Red, non-blanchable lesions to bilateral knees   Neurological:      General: No focal deficit present.      Mental Status: He is alert and oriented to person, place, and time.   Psychiatric:         Mood and Affect: Mood normal.         Behavior: Behavior normal.         Clinic images 3/8/22              Assessment:           Rojelio was seen today for recurrent skin infections.    Diagnoses and all orders for this visit:    Cellulitis of left lower extremity    Lymphedema of both lower extremities  -     Ambulatory referral/consult to Physical/Occupational Therapy; Future    Onychomycosis of foot with other complication  -     fluconazole (DIFLUCAN) 150 MG Tab; Take 1 tablet (150 mg total) by mouth once a week.         Plan:     - Labs reviewed. CRP now down to normal. Kidney function actually improved from baseline. Slight neutropenia  - clinically they report he is improved, although maybe more swollen and red today. Swelling is worse in BLEs but improves after he has his legs elevated  - Long discussion had about the nature of his condition. Reviewed that his presentation is likely a combination of venous insufficiency as well as chronic lymphedema, with superimposed cellulitis. It appears the cellulitis aspect of things is improving based on labwork  - will refer to lymphedema clinic. Recommended compression and elevation  - start fluconazole 150mg po weekly x1 year for treatment of onychomycosis which is also a risk factor for cellulitis  - will continue on originally planned 6  week course of IV Daptomycin + CTX since he seems to be tolerating  - continue weekly CBC, CMP, CRP, CPK  - if still with edema/swelling/pain/erythema, may consider repeat imaging at the end of his course  - [place] [facility] [place] have concerns for hardware involvement and did not recommend any surgery at this time  - unclear what the red lesions to his knees are from. He does not have them anywhere else on his body. Advised monitoring  - f/u in ID clinic at the end of his course on 3/24      Patient was seen with ID Staff, Dr. Mayen who was involved in his plan of care       Greater than 45 minutes was spent on this encounter, which included: review of recent encounters, review and interpretation of labs/images, obtaining pertinent history, performing a physical examination, counseling and educating the patient/family/caregiver, ordering medications/tests, documenting in the electronic health record, and coordinating care with necessary providers.    Edel Neville PA-C  Infectious Diseases  [facility]

## 2022-03-14 PROBLEM — E83.39 HYPERPHOSPHATEMIA: Status: ACTIVE | Noted: 2022-03-14

## 2022-03-14 PROBLEM — D50.9 IRON DEFICIENCY ANEMIA: Status: ACTIVE | Noted: 2022-03-14

## 2022-03-14 PROBLEM — N25.81 SECONDARY HYPERPARATHYROIDISM OF RENAL ORIGIN: Status: ACTIVE | Noted: 2022-03-14

## 2022-03-14 PROBLEM — E11.9 CONTROLLED TYPE 2 DIABETES MELLITUS WITHOUT COMPLICATION: Status: ACTIVE | Noted: 2021-03-13

## 2022-03-14 PROBLEM — M10.9 GOUT: Status: ACTIVE | Noted: 2022-03-14

## 2022-03-14 PROBLEM — E55.9 VITAMIN D DEFICIENCY, UNSPECIFIED: Status: ACTIVE | Noted: 2022-03-14

## 2022-03-24 ENCOUNTER — OFFICE VISIT (OUTPATIENT)
Dept: INFECTIOUS DISEASES | Facility: CLINIC | Age: 77
End: 2022-03-24
Payer: MEDICARE

## 2022-03-24 DIAGNOSIS — I89.0 LYMPHEDEMA OF BOTH LOWER EXTREMITIES: ICD-10-CM

## 2022-03-24 DIAGNOSIS — L03.116 CELLULITIS OF LEFT LOWER EXTREMITY: ICD-10-CM

## 2022-03-24 DIAGNOSIS — Z79.2 RECEIVING INTRAVENOUS ANTIBIOTIC TREATMENT AS OUTPATIENT: Primary | ICD-10-CM

## 2022-03-24 PROCEDURE — 99213 OFFICE O/P EST LOW 20 MIN: CPT | Mod: S$GLB,,, | Performed by: INTERNAL MEDICINE

## 2022-03-24 PROCEDURE — 99999 PR PBB SHADOW E&M-EST. PATIENT-LVL II: ICD-10-PCS | Mod: PBBFAC,,, | Performed by: INTERNAL MEDICINE

## 2022-03-24 PROCEDURE — 3066F NEPHROPATHY DOC TX: CPT | Mod: CPTII,S$GLB,, | Performed by: INTERNAL MEDICINE

## 2022-03-24 PROCEDURE — 3051F HG A1C>EQUAL 7.0%<8.0%: CPT | Mod: CPTII,S$GLB,, | Performed by: INTERNAL MEDICINE

## 2022-03-24 PROCEDURE — 3060F POS MICROALBUMINURIA REV: CPT | Mod: CPTII,S$GLB,, | Performed by: INTERNAL MEDICINE

## 2022-03-24 PROCEDURE — 3066F PR DOCUMENTATION OF TREATMENT FOR NEPHROPATHY: ICD-10-PCS | Mod: CPTII,S$GLB,, | Performed by: INTERNAL MEDICINE

## 2022-03-24 PROCEDURE — 1159F PR MEDICATION LIST DOCUMENTED IN MEDICAL RECORD: ICD-10-PCS | Mod: CPTII,S$GLB,, | Performed by: INTERNAL MEDICINE

## 2022-03-24 PROCEDURE — 99213 PR OFFICE/OUTPT VISIT, EST, LEVL III, 20-29 MIN: ICD-10-PCS | Mod: S$GLB,,, | Performed by: INTERNAL MEDICINE

## 2022-03-24 PROCEDURE — 1159F MED LIST DOCD IN RCRD: CPT | Mod: CPTII,S$GLB,, | Performed by: INTERNAL MEDICINE

## 2022-03-24 PROCEDURE — 4010F ACE/ARB THERAPY RXD/TAKEN: CPT | Mod: CPTII,S$GLB,, | Performed by: INTERNAL MEDICINE

## 2022-03-24 PROCEDURE — 1160F PR REVIEW ALL MEDS BY PRESCRIBER/CLIN PHARMACIST DOCUMENTED: ICD-10-PCS | Mod: CPTII,S$GLB,, | Performed by: INTERNAL MEDICINE

## 2022-03-24 PROCEDURE — 1160F RVW MEDS BY RX/DR IN RCRD: CPT | Mod: CPTII,S$GLB,, | Performed by: INTERNAL MEDICINE

## 2022-03-24 PROCEDURE — 3060F PR POS MICROALBUMINURIA RESULT DOCUMENTED/REVIEW: ICD-10-PCS | Mod: CPTII,S$GLB,, | Performed by: INTERNAL MEDICINE

## 2022-03-24 PROCEDURE — 3051F PR MOST RECENT HEMOGLOBIN A1C LEVEL 7.0 - < 8.0%: ICD-10-PCS | Mod: CPTII,S$GLB,, | Performed by: INTERNAL MEDICINE

## 2022-03-24 PROCEDURE — 99999 PR PBB SHADOW E&M-EST. PATIENT-LVL II: CPT | Mod: PBBFAC,,, | Performed by: INTERNAL MEDICINE

## 2022-03-24 PROCEDURE — 4010F PR ACE/ARB THEARPY RXD/TAKEN: ICD-10-PCS | Mod: CPTII,S$GLB,, | Performed by: INTERNAL MEDICINE

## 2022-03-24 NOTE — PROGRESS NOTES
Patient ID: Rojelio Dye Sr. is a 76 y.o. male.    Subjective:           Chief Complaint: Follow-up      HPI  75yo male male with a PMH of GERD, hypertension, BPH, CKD, OA/RA/gouty arthritis, venous insufficiency s/p ablations, Charcot foot s/p bilateral triple arthrodesis, and recent left lower extremity cellulitis admitted to [facility] on 2/12/22 for recurrent left leg cellulitis     - 12/16/21 - received steroid injections from Dr. Espinal of his bilateral knees, L hip, and L shoulder pain secondary to degenerative arthritis   - 1/1/22 - diagnosed with COVID19  - began having swelling to his BLEs and difficulty conrolling BP. Saw his cardiologist who adjusted BP meds   - 1/17/22 - sustained a fall with minor abrasion wounds including knees. Continued swelling with weeping to BLEs  - 1/20/22 - saw Ortho Dr. Espinal for supposed patella fracture, but told it was just ligament damage. Prescribed clindamycin 300mg po TID but cellulitis did not improve  - 1/25/22 to 1/28/22 hospitalized at [facility] for LLE cellulitis. Improved on IV Vanc/Zosyn then Ancef. Discharged home on PO ceftin x7 days [1/29-2/5] and reports resolution of symptoms  - 2/9/22 had recurrence of swelling and erythema  - 2/10/22 resumed on IV Ancef as outpatient by his PCP Dr. Grajeda but swelling/erythema/pain continued  - 2/12/22 readmitted for recurrent cellulitis. Started again on IV Vanc/Zosyn. Cellulitis improved.  - 2/14 CT left foot WO contrast - no evidence of osteo or abscess  - 2/16 MRI: Postoperative changes of calcaneocuboid, talocalcaneal, and talonavicular arthrodesis are seen with hardware susceptibility artifact limiting regional assessment.  No definite suspicious bone marrow edema suggestive of acute fracture or osteomyelitis is appreciated. There appears to be fluid signal intensity about the extensor digitorum tendons suggestive of tenosynovitis, possibly inflammatory or infectious in nature. There appears to be diffuse intramuscular  edema which may reflect nonspecific myositis. There is subcutaneous soft tissue edema about the foot and ankle which may reflect cellulitis in the setting of infection.  No loculated and drainable fluid collections are appreciated by noncontrast MRI.   - Discussion was had with patient and family, and given the recurrent nature and prolonged time to improvement on IV antibiotics, it was decided to treat him with prolonged antibiotic course despite negative imaging  - Final recs by Dr. Singh/Wendie Rodriguez: 6 weeks of IV antibiotics - Rocephin 2 grams IV qday and IV Daptomycin 4mg/kg (EOC 3/26/2022)    Interval HPI:  - Patient here with his wife  - patient tells me that he is feeling well.  Best he has ever felt in a while.  - still receiving daptomycin and ceftriaxone with no side effects.  - patient has completed 6 weeks of IV antibiotics.  - denies fever, chills, night sweats.      Past Medical History:   Diagnosis Date    Acid reflux     Acquired hammer toe of right foot 8/29/2014    Arthritis of foot 12/9/2015    Benign essential hypertension 4/23/2014    BPH with urinary obstruction 12/10/2015    CKD (chronic kidney disease), stage II 4/23/2014    Followed by Dr. Rodriguez Brandt    Diabetic Charcot foot     Encounter for blood transfusion     HAV (hallux abducto valgus)     Hypercholesterolemia without hypertriglyceridemia 7/17/2004    Inflammatory osteoarthritis     Mild intermittent asthma without complication 4/23/2014    Narcolepsy     ADRIANNA on CPAP 4/23/2014    Osteoarthrosis involving, or with mention of more than one site, but not specified as generalized, multiple sites 12/28/2011    Pancytopenia 2/15/2022    Pericarditis     Pes planovalgus 5/5/2014    Pre-diabetes 4/23/2014    RLS (restless legs syndrome)     Schwannoma     T vert body       Past Surgical History:   Procedure Laterality Date    ADENOIDECTOMY      COCHLEAR IMPLANT REVISION      DECOMPRESSION OF CERVICAL SPINE BY ANTERIOR  APPROACH WITH FUSION N/A 12/22/2018    Procedure: DECOMPRESSION AND FUSION, SPINE, CERVICAL, ANTERIOR APPROACH, C3-C5;  Surgeon: Gian Mao MD;  Location: [facility];  Service: Neurosurgery;  Laterality: N/A;    FOOT SURGERY  4-22-14    left    FUSION OF CERVICAL SPINE BY POSTERIOR APPROACH N/A 12/22/2018    Procedure: POSTERIOR CERVICAL FUSION WITH Segmented intrumentation LAMINECTOMY, C3-C5;  Surgeon: Gian Mao MD;  Location: [facility] OR;  Service: Neurosurgery;  Laterality: N/A;    HERNIA REPAIR      PROSTATE SURGERY      TONSILLECTOMY      VASECTOMY         Review of patient's allergies indicates:   Allergen Reactions    Sulfa (sulfonamide antibiotics) Hives    Sulfamethoxazole-trimethoprim Rash    Mobic [meloxicam] Hives       Family History   Problem Relation Age of Onset    Osteoporosis Mother     Heart failure Mother     Heart disease Father     Rheum arthritis Neg Hx     Lupus Neg Hx        Social History     Socioeconomic History    Marital status:    Tobacco Use    Smoking status: Never Smoker    Smokeless tobacco: Never Used    Tobacco comment: 2nd Hand -Father smoked 3 packs a day   Substance and Sexual Activity    Alcohol use: Not Currently     Comment: occ    Drug use: No    Sexual activity: Not Currently     Partners: Female     Birth control/protection: None       Current Outpatient Medications   Medication Instructions    acetaminophen (TYLENOL) 650 mg, Oral, 2 times daily    albuterol (VENTOLIN HFA) 90 mcg/actuation inhaler 2 puffs, Inhalation, Every 6 hours PRN, Rescue    allopurinoL (ZYLOPRIM) 100 MG tablet TAKE 1 TABLET EVERY MORNING    aspirin 81 mg, Oral, Every other day    blood-glucose meter kit To check BG 4 times daily, to use with insurance preferred meter    calcitRIOL (ROCALTROL) 0.25 mcg, Oral, Weekly    carvediloL (COREG) 25 MG tablet TAKE 1 TABLET BY MOUTH TWICE DAILY FOR HEART / BLOOD PRESSURE    cefTRIAXone (ROCEPHIN) 2 g,  Intravenous, Daily    ceftriaxone sodium (CEFTRIAXONE IV) 2 g, Intravenous, Daily, Ceftriaxone 2gm/20ml NS by Slow IV Push over 5 minutes once daily    cholecalciferol (vitamin D3) 5,000 Units, Oral, Daily    cloNIDine (CATAPRES) 0.1 mg, Oral, Every 4 hours PRN    cloNIDine (CATAPRES) 0.1 mg, Oral, With breakfast    cloNIDine (CATAPRES) 0.2 mg, Oral, With dinner    DAPTOMYCIN  mg, intravenous push, Daily, Daptomycin 260mg/20ml NS by slow IVP over 5min once daily    doxazosin (CARDURA) 4 mg, Oral, Daily    DULoxetine (CYMBALTA) 30 mg, Oral, Daily    famotidine (PEPCID) 20 mg, Oral, 2 times daily    fluconazole (DIFLUCAN) 150 mg, Oral, Weekly    furosemide (LASIX) 40 mg, Oral, Every other day, At 2 pm on an empty stomach    gabapentin (NEURONTIN) 300 mg, Oral, 3 times daily    glucosamine-chondroitin 500-400 mg tablet 1 tablet, Oral, 2 times daily    heparin flush, porcine, (HEPARIN FLUSH 10 UNITS/ML) 10 unit/mL injection 5 mLs, Intravenous, See admin instructions, Skilled Nurse or may instruct patient/caregiver to Flush each port of PICC line with 5ml of Heparin daily after IV antibiotic complete, and nurse to flush PICC port as needed after final NS flush after blood draws.    hydroCHLOROthiazide (HYDRODIURIL) 25 mg, Oral, Daily    insulin aspart U-100 (NOVOLOG) 1-10 Units, Subcutaneous, 4 times daily with meals & nightly, blood sugar <150 no [id number], give 2 [id number], give 4 [id number], give 6 [id number], give 8 [id number], give 10 units> 400 , give 12 units    LEVEMIR FLEXTOUCH U-100 INSULN 26 Units, Subcutaneous, Daily    losartan (COZAAR) 25 MG tablet TAKE 1/2 TABLET BY MOUTH EVERY EVENING    losartan (COZAAR) 25 mg, Oral, Daily    menthol/camphor/irr cntr-irtn1 (NICOLA NATURAL PAIN RELIEVING TOP) 1 application, Topical (Top), 3 times daily    MITIGARE 0.6 mg Cap TAKE 1 CAPSULE (0.6 MG TOTAL) BY MOUTH 2 TIMES A DAY AS DIRECTED    multivitamin with minerals tablet 1  "tablet, Oral, Daily    oxybutynin (DITROPAN) 5 mg, Oral, 3 times daily    oxyCODONE-acetaminophen (PERCOCET)  mg per tablet 1 tablet, Oral, Every 4 hours PRN    pen needle, diabetic 32 gauge x 5/32" Ndle USE WITH INSULIN PENS    rOPINIRole (REQUIP) 3 mg, Oral, Nightly    sildenafiL (VIAGRA) 100 mg, Oral, As needed (PRN)    simvastatin (ZOCOR) 40 MG tablet TAKE 1 TABLET BY MOUTH AT BEDTIME FOR CHOLESTEROL    sodium chloride 0.9% (NORMAL SALINE FLUSH) injection 10 mLs, Intravenous, See admin instructions, IV FLUSH: PER PICCSn to perform/ may instruct patient/ caregiver to flush PICC before/ after antibiotic/medication with 10 ml 0.9% Normal Saline followed by 5 ml of Heparin (10 units/ml).  Flush unused lumens daily with 10ml 0.9% Normal saline followed by 5 ml of Heparin ( 10 units/ml); Change all swab caps after each infusion and blood draw and/or every 24 hours.  May Omit Heparin IV flushes for Neutropenic patients or as directed by MD.    tamsulosin (FLOMAX) 0.4 mg Cap TAKE 1 CAPSULE AT BEDTIME    TRUE METRIX GLUCOSE TEST STRIP Strp USE TO CHECK BLOOD GLUCOSE 4 TIMES A DAY    TRUEPLUS LANCETS 30 gauge Misc USE TO TEST BLOOD SUGAR 4 TIMES A DAY         Review of Systems   Constitutional: Negative for activity change, appetite change, chills, fatigue and fever.   HENT: Negative for congestion, mouth sores, sinus pain and sore throat.    Eyes: Negative for photophobia and visual disturbance.   Respiratory: Negative for cough, chest tightness, shortness of breath and wheezing.    Cardiovascular: Positive for leg swelling. Negative for chest pain and palpitations.   Gastrointestinal: Negative for abdominal pain, diarrhea, nausea and vomiting.   Genitourinary: Negative for dysuria, flank pain, hematuria and urgency.   Musculoskeletal: Positive for joint swelling. Negative for arthralgias, myalgias, neck pain and neck stiffness.   Skin: Positive for color change and rash.   Allergic/Immunologic: Negative " for immunocompromised state.   Neurological: Negative for dizziness, seizures and headaches.   Psychiatric/Behavioral: Negative for confusion.           Objective:     There were no vitals filed for this visit.    There is no height or weight on file to calculate BMI.         Physical Exam  Vitals and nursing note reviewed.   Constitutional:       General: He is awake. He is not in acute distress.     Appearance: He is well-developed and well-groomed. He is not ill-appearing or diaphoretic.   HENT:      Head: Normocephalic and atraumatic.      Right Ear: External ear normal.      Left Ear: External ear normal.      Nose: Nose normal.   Eyes:      General: No scleral icterus.        Right eye: No discharge.         Left eye: No discharge.      Pupils: Pupils are equal, round, and reactive to light.   Cardiovascular:      Rate and Rhythm: Normal rate and regular rhythm.      Heart sounds: Normal heart sounds. No murmur heard.  Pulmonary:      Effort: Pulmonary effort is normal. No accessory muscle usage or respiratory distress.      Breath sounds: Normal breath sounds.   Abdominal:      General: Bowel sounds are normal. There is no distension.      Palpations: Abdomen is soft.      Tenderness: There is no abdominal tenderness. There is no guarding.   Musculoskeletal:      Cervical back: Normal range of motion and neck supple.      Right foot: Deformity and Charcot foot present.      Left foot: Deformity and Charcot foot present.   Feet:      Right foot:      Skin integrity: Dry skin present. No ulcer, erythema or warmth.      Toenail Condition: Right toenails are abnormally thick. Fungal disease present.     Left foot:      Skin integrity: Erythema and dry skin present. No ulcer or warmth.      Toenail Condition: Left toenails are abnormally thick. Fungal disease present.     Comments: Pitting edema to BLEs up to the knee  Lymphadenopathy:      Cervical: No cervical adenopathy.   Skin:     General: Skin is warm and  dry.      Findings: Rash present. Rash is macular.      Comments: Red, non-blanchable lesions to bilateral knees   Neurological:      General: No focal deficit present.      Mental Status: He is alert and oriented to person, place, and time.   Psychiatric:         Mood and Affect: Mood normal.         Behavior: Behavior normal.         Clinic images 3/8/22              Assessment:         Rojelio was seen today for follow-up.    Diagnoses and all orders for this visit:    Receiving intravenous antibiotic treatment as outpatient    Cellulitis of left lower extremity    Lymphedema of both lower extremities         Plan:     - After 6 weeks of daptomycin and ceftriaxone patient endorses that he feels the best he has ever felt in a long time.  - Will discontinue PICC line and antibiotics.  - During my examination and noticed bilateral leg edema so I have recommended patient to follow with lymphedema Clinic, keep legs elevated and convey findings with Dr. Grajeda.  - Follow with ID as needed       Infectious Diseases  [facility]

## 2022-07-22 PROBLEM — L03.90 CELLULITIS: Status: ACTIVE | Noted: 2022-07-22

## 2022-07-23 PROBLEM — I73.9 PVD (PERIPHERAL VASCULAR DISEASE): Status: ACTIVE | Noted: 2022-07-23

## 2022-07-26 ENCOUNTER — TELEPHONE (OUTPATIENT)
Dept: INFECTIOUS DISEASES | Facility: CLINIC | Age: 77
End: 2022-07-26
Payer: MEDICARE

## 2022-07-26 NOTE — TELEPHONE ENCOUNTER
----- Message from Leyda Godfrey, Patient Care Assistant sent at 7/26/2022 12:41 PM CDT -----  Regarding: appointment  Contact: summer with [facility]  Type:  Sooner Appointment Request    Caller is requesting a sooner appointment.  Caller declined first available appointment listed below.  Caller will not accept being placed on the waitlist and is requesting a message be sent to doctor.    Name of Caller: summer with [facility]  When is the first available appointment?  2022  Symptoms:  hospital f/u   Best Call Back Number:  [phone] (home)     Additional Information:  please call summer with [facility] to advise. Thanks!

## 2022-07-26 NOTE — TELEPHONE ENCOUNTER
Dr Mayen had already seen patient, signed off and documented in the chart that patient did not need ID clinic f/u appt.

## 2022-08-02 ENCOUNTER — PATIENT MESSAGE (OUTPATIENT)
Dept: INFECTIOUS DISEASES | Facility: CLINIC | Age: 77
End: 2022-08-02
Payer: MEDICARE

## 2022-08-10 ENCOUNTER — OFFICE VISIT (OUTPATIENT)
Dept: INFECTIOUS DISEASES | Facility: CLINIC | Age: 77
End: 2022-08-10
Payer: MEDICARE

## 2022-08-10 DIAGNOSIS — L03.116 CELLULITIS OF LEFT LOWER EXTREMITY: Primary | ICD-10-CM

## 2022-08-10 DIAGNOSIS — I89.0 LYMPHEDEMA OF BOTH LOWER EXTREMITIES: ICD-10-CM

## 2022-08-10 PROCEDURE — 3060F PR POS MICROALBUMINURIA RESULT DOCUMENTED/REVIEW: ICD-10-PCS | Mod: CPTII,95,, | Performed by: INTERNAL MEDICINE

## 2022-08-10 PROCEDURE — 1159F MED LIST DOCD IN RCRD: CPT | Mod: CPTII,95,, | Performed by: INTERNAL MEDICINE

## 2022-08-10 PROCEDURE — 1160F RVW MEDS BY RX/DR IN RCRD: CPT | Mod: CPTII,95,, | Performed by: INTERNAL MEDICINE

## 2022-08-10 PROCEDURE — 3051F HG A1C>EQUAL 7.0%<8.0%: CPT | Mod: CPTII,95,, | Performed by: INTERNAL MEDICINE

## 2022-08-10 PROCEDURE — 3066F NEPHROPATHY DOC TX: CPT | Mod: CPTII,95,, | Performed by: INTERNAL MEDICINE

## 2022-08-10 PROCEDURE — 99212 OFFICE O/P EST SF 10 MIN: CPT | Mod: 95,,, | Performed by: INTERNAL MEDICINE

## 2022-08-10 PROCEDURE — 3060F POS MICROALBUMINURIA REV: CPT | Mod: CPTII,95,, | Performed by: INTERNAL MEDICINE

## 2022-08-10 PROCEDURE — 99212 PR OFFICE/OUTPT VISIT, EST, LEVL II, 10-19 MIN: ICD-10-PCS | Mod: 95,,, | Performed by: INTERNAL MEDICINE

## 2022-08-10 PROCEDURE — 3066F PR DOCUMENTATION OF TREATMENT FOR NEPHROPATHY: ICD-10-PCS | Mod: CPTII,95,, | Performed by: INTERNAL MEDICINE

## 2022-08-10 PROCEDURE — 4010F ACE/ARB THERAPY RXD/TAKEN: CPT | Mod: CPTII,95,, | Performed by: INTERNAL MEDICINE

## 2022-08-10 PROCEDURE — 1160F PR REVIEW ALL MEDS BY PRESCRIBER/CLIN PHARMACIST DOCUMENTED: ICD-10-PCS | Mod: CPTII,95,, | Performed by: INTERNAL MEDICINE

## 2022-08-10 PROCEDURE — 4010F PR ACE/ARB THEARPY RXD/TAKEN: ICD-10-PCS | Mod: CPTII,95,, | Performed by: INTERNAL MEDICINE

## 2022-08-10 PROCEDURE — 3051F PR MOST RECENT HEMOGLOBIN A1C LEVEL 7.0 - < 8.0%: ICD-10-PCS | Mod: CPTII,95,, | Performed by: INTERNAL MEDICINE

## 2022-08-10 PROCEDURE — 1159F PR MEDICATION LIST DOCUMENTED IN MEDICAL RECORD: ICD-10-PCS | Mod: CPTII,95,, | Performed by: INTERNAL MEDICINE

## 2022-08-10 NOTE — PROGRESS NOTES
The patient location is: Home  The chief complaint leading to consultation is: follow up wound    Visit type: audiovisual    Face to Face time with patient: 7  15 minutes of total time spent on the encounter, which includes face to face time and non-face to face time preparing to see the patient (eg, review of tests), Obtaining and/or reviewing separately obtained history, Documenting clinical information in the electronic or other health record, Independently interpreting results (not separately reported) and communicating results to the patient/family/caregiver, or Care coordination (not separately reported).         Each patient to whom he or she provides medical services by telemedicine is:  (1) informed of the relationship between the physician and patient and the respective role of any other health care provider with respect to management of the patient; and (2) notified that he or she may decline to receive medical services by telemedicine and may withdraw from such care at any time.    Notes:       Patient ID: Rojelio Dye Sr. is a 77 y.o. male.    Subjective:        Chief Complaint: Follow-up    HPI    Patient evaluated via video.  Follow-up from hospitalization due to soft tissue infection and vascular issues.  Patient completed 14 days of IV antibiotics.  Patient tells me that he is feeling significantly better and source are improving.  Patient is to be seen by Cardiology for angioplasty.  Denies fever, chills, night sweats, swelling, lower extremity erythema.      Past Medical History:   Diagnosis Date    Acid reflux     Acquired hammer toe of right foot 8/29/2014    Arthritis of foot 12/9/2015    Benign essential hypertension 4/23/2014    BPH with urinary obstruction 12/10/2015    CKD (chronic kidney disease), stage II 4/23/2014    Followed by Dr. Rodriguez Brandt    Diabetic Charcot foot     Encounter for blood transfusion     HAV (hallux abducto valgus)     Hypercholesterolemia without  hypertriglyceridemia 7/17/2004    Inflammatory osteoarthritis     Mild intermittent asthma without complication 4/23/2014    Narcolepsy     ADRIANNA on CPAP 4/23/2014    Osteoarthrosis involving, or with mention of more than one site, but not specified as generalized, multiple sites 12/28/2011    Pancytopenia 2/15/2022    Pericarditis     Pes planovalgus 5/5/2014    Pre-diabetes 4/23/2014    RLS (restless legs syndrome)     Schwannoma     T vert body       Past Surgical History:   Procedure Laterality Date    ADENOIDECTOMY      cataracts surgery      COCHLEAR IMPLANT REVISION      DECOMPRESSION OF CERVICAL SPINE BY ANTERIOR APPROACH WITH FUSION N/A 12/22/2018    Procedure: DECOMPRESSION AND FUSION, SPINE, CERVICAL, ANTERIOR APPROACH, C3-C5;  Surgeon: Gian Mao MD;  Location: [facility] OR;  Service: Neurosurgery;  Laterality: N/A;    FOOT SURGERY  04/22/2014    left    FUSION OF CERVICAL SPINE BY POSTERIOR APPROACH N/A 12/22/2018    Procedure: POSTERIOR CERVICAL FUSION WITH Segmented intrumentation LAMINECTOMY, C3-C5;  Surgeon: Gian Mao MD;  Location: [facility] OR;  Service: Neurosurgery;  Laterality: N/A;    HERNIA REPAIR      PROSTATE SURGERY      TONSILLECTOMY      VASECTOMY         Review of patient's allergies indicates:   Allergen Reactions    Sulfa (sulfonamide antibiotics) Hives    Sulfamethoxazole-trimethoprim Rash    Mobic [meloxicam] Hives       Family History   Problem Relation Age of Onset    Osteoporosis Mother     Heart failure Mother     Heart disease Father     Rheum arthritis Neg Hx     Lupus Neg Hx        Social History     Socioeconomic History    Marital status:    Tobacco Use    Smoking status: Never Smoker    Smokeless tobacco: Never Used    Tobacco comment: 2nd Hand -Father smoked 3 packs a day   Substance and Sexual Activity    Alcohol use: Not Currently     Comment: occ    Drug use: No    Sexual activity: Not Currently     Partners: Female      Birth control/protection: None       Current Outpatient Medications   Medication Instructions    acetaminophen (TYLENOL) 650 mg, Oral, 2 times daily    albuterol (VENTOLIN HFA) 90 mcg/actuation inhaler 2 puffs, Inhalation, Every 6 hours PRN, Rescue    allopurinoL (ZYLOPRIM) 300 mg, Oral, Every morning    amoxicillin-clavulanate 500-125mg (AUGMENTIN) 500-125 mg Tab 500 mg, Oral, 2 times daily    aspirin 81 mg, Oral, Nightly    blood-glucose meter kit To check BG 4 times daily, to use with insurance preferred meter    carvediloL (COREG) 12.5 mg, Oral, 2 times daily with meals    cholecalciferol (vitamin D3) 5,000 Units, Oral, Weekly    cloNIDine (CATAPRES) 0.1 mg, Oral, Every 4 hours PRN    cloNIDine (CATAPRES) 0.1 mg, Oral, Daily, in a.m.    cloNIDine (CATAPRES) 0.2 mg, Oral, Nightly    doxazosin (CARDURA) 8 mg, Oral, Nightly    DULoxetine (CYMBALTA) 30 mg, Oral, Daily    famotidine (PEPCID) 20 MG tablet TAKE 1 TABLET BY MOUTH TWICE DAILY FOR THE STOMACH    fluticasone propionate (FLONASE) 100 mcg, Each Nostril, Daily    furosemide (LASIX) 40 mg, Oral, Every other day, At 2 pm on an empty stomach    gabapentin (NEURONTIN) 300 MG capsule TAKE 1 CAPSULE (300 MG TOTAL) BY MOUTH 3 TIMES DAILY AS DIRECTED    glucosamine-chondroitin 500-400 mg tablet 1 tablet, Oral, Daily    HYDROcodone-acetaminophen (NORCO)  mg per tablet 1 tablet, Oral, Every 6 hours PRN    insulin aspart U-100 (NOVOLOG) 1-10 Units, Subcutaneous, 4 times daily with meals & nightly, blood sugar <150 no [id number], give 2 [id number], give 4 [id number], give 6 [id number], give 8 [id number], give 10 units> 400 , give 12 units    LEVEMIR FLEXTOUCH U-100 INSULN 26 Units, Subcutaneous, Daily    menthol/camphor/irr cntr-irtn1 (NICOLA NATURAL PAIN RELIEVING TOP) 1 application, Topical (Top), 3 times daily    miconazole (MICOTIN) 2 % cream Topical (Top), 2 times daily    MITIGARE 0.6 mg Cap TAKE 1 CAPSULE (0.6 MG  "TOTAL) BY MOUTH 2 TIMES A DAY AS DIRECTED    multivitamin with minerals tablet 1 tablet, Oral, Daily    mupirocin (BACTROBAN) 2 % ointment Topical (Top), 3 times daily    oxybutynin (DITROPAN) 5 mg, Oral, 3 times daily    pen needle, diabetic 32 gauge x 5/32" Ndle USE WITH INSULIN PENS    rOPINIRole (REQUIP) 3 MG tablet TAKE 1 TABLET BY MOUTH EVERY EVENING    sildenafiL (VIAGRA) 100 mg, Oral, As needed (PRN)    simvastatin (ZOCOR) 40 MG tablet TAKE 1 TABLET BY MOUTH AT BEDTIME FOR CHOLESTEROL    terbinafine HCL (LAMISIL) 250 mg, Oral, Daily    TRUE METRIX GLUCOSE TEST STRIP Strp USE TO CHECK BLOOD GLUCOSE 4 TIMES A DAY    TRUEPLUS LANCETS 30 gauge Misc USE TO TEST BLOOD SUGAR 4 TIMES A DAY         Review of Systems   Constitutional: Negative for activity change, chills, diaphoresis, fatigue, fever and unexpected weight change.   HENT: Negative for sore throat.    Eyes: Negative for photophobia and visual disturbance.   Respiratory: Negative for cough and shortness of breath.    Cardiovascular: Negative for chest pain and leg swelling.   Gastrointestinal: Negative for abdominal distention, abdominal pain, nausea and vomiting.   Genitourinary: Negative for difficulty urinating, dysuria and flank pain.   Musculoskeletal: Negative for arthralgias.   Skin: Negative for color change and rash.   Allergic/Immunologic: Negative for immunocompromised state.   Neurological: Negative for dizziness and headaches.   Psychiatric/Behavioral: The patient is not nervous/anxious.            Objective:     There were no vitals filed for this visit.    There is no height or weight on file to calculate BMI.         Physical Exam  Constitutional:       Appearance: Normal appearance.   Neurological:      General: No focal deficit present.      Mental Status: He is alert and oriented to person, place, and time.   Psychiatric:         Mood and Affect: Mood normal.         Behavior: Behavior normal.           Assessment:       "     Rojelio was seen today for follow-up.    Diagnoses and all orders for this visit:    Cellulitis of left lower extremity    Lymphedema of both lower extremities         Plan:     Status post IV antibiotics 14 days broad-spectrum.  I do think that patient has issues are mostly due to vascular insufficiency but patient is seen cardiology for angioplasty.  No need for further antibiotics at this point considering that patient is feeling better.  Follow-up with Infectious Disease as needed       Greater than 15 minutes was spent on this encounter, which included: review of recent encounters, review and interpretation of labs/images, obtaining pertinent history, performing a physical examination, counseling and educating the patient/family/caregiver, ordering medications/tests, documenting in the electronic health record, and coordinating care with necessary providers.    Humberto Mayen MD  Infectious Diseases

## 2022-10-14 PROBLEM — I87.8 VENOUS STASIS: Status: ACTIVE | Noted: 2022-10-14

## 2022-10-14 PROBLEM — L03.115 CELLULITIS OF RIGHT LOWER EXTREMITY: Status: ACTIVE | Noted: 2022-10-14

## 2022-10-15 PROBLEM — Z79.4 TYPE 2 DIABETES MELLITUS WITH STAGE 3 CHRONIC KIDNEY DISEASE, WITH LONG-TERM CURRENT USE OF INSULIN: Status: ACTIVE | Noted: 2022-10-15

## 2022-10-15 PROBLEM — E11.22 TYPE 2 DIABETES MELLITUS WITH STAGE 3 CHRONIC KIDNEY DISEASE, WITH LONG-TERM CURRENT USE OF INSULIN: Status: ACTIVE | Noted: 2022-10-15

## 2022-10-15 PROBLEM — N18.30 TYPE 2 DIABETES MELLITUS WITH STAGE 3 CHRONIC KIDNEY DISEASE, WITH LONG-TERM CURRENT USE OF INSULIN: Status: ACTIVE | Noted: 2022-10-15

## 2022-10-18 DIAGNOSIS — I73.9 PERIPHERAL VASCULAR DISEASE, UNSPECIFIED: Primary | ICD-10-CM

## 2022-10-18 DIAGNOSIS — Z79.01 LONG TERM (CURRENT) USE OF ANTICOAGULANTS: ICD-10-CM

## 2022-10-20 ENCOUNTER — PATIENT MESSAGE (OUTPATIENT)
Dept: VASCULAR SURGERY | Facility: CLINIC | Age: 77
End: 2022-10-20
Payer: MEDICARE

## 2022-10-20 ENCOUNTER — TELEPHONE (OUTPATIENT)
Dept: VASCULAR SURGERY | Facility: CLINIC | Age: 77
End: 2022-10-20
Payer: MEDICARE

## 2022-10-20 NOTE — TELEPHONE ENCOUNTER
Spoke with patients wife and she was letting us know about patient being flagged for MRSA. She states they have started protocol already to treat it. I verbalized understanding. The phone call ended.

## 2022-10-20 NOTE — TELEPHONE ENCOUNTER
----- Message from Maricruz Garcia sent at 10/20/2022 11:08 AM CDT -----  Type: Needs Medical Advice  Who Called:  pt wife, Janene  Symptoms (please be specific):  said she need to speak to the office in regards to the procedure her  is having on Monday--please call and advise  Best Call Back Number: [phone]    Additional Information: thank you

## 2022-10-25 ENCOUNTER — PATIENT MESSAGE (OUTPATIENT)
Dept: VASCULAR SURGERY | Facility: CLINIC | Age: 77
End: 2022-10-25
Payer: MEDICARE

## 2022-11-14 ENCOUNTER — PATIENT MESSAGE (OUTPATIENT)
Dept: VASCULAR SURGERY | Facility: CLINIC | Age: 77
End: 2022-11-14
Payer: MEDICARE

## 2022-11-17 ENCOUNTER — OFFICE VISIT (OUTPATIENT)
Dept: VASCULAR SURGERY | Facility: CLINIC | Age: 77
End: 2022-11-17
Payer: MEDICARE

## 2022-11-17 VITALS
WEIGHT: 168.31 LBS | BODY MASS INDEX: 26.42 KG/M2 | DIASTOLIC BLOOD PRESSURE: 73 MMHG | HEIGHT: 67 IN | SYSTOLIC BLOOD PRESSURE: 126 MMHG | HEART RATE: 55 BPM

## 2022-11-17 DIAGNOSIS — I73.9 PAD (PERIPHERAL ARTERY DISEASE): Primary | ICD-10-CM

## 2022-11-17 PROCEDURE — 99999 PR PBB SHADOW E&M-EST. PATIENT-LVL IV: CPT | Mod: PBBFAC,,, | Performed by: THORACIC SURGERY (CARDIOTHORACIC VASCULAR SURGERY)

## 2022-11-17 PROCEDURE — 3074F PR MOST RECENT SYSTOLIC BLOOD PRESSURE < 130 MM HG: ICD-10-PCS | Mod: CPTII,S$GLB,, | Performed by: THORACIC SURGERY (CARDIOTHORACIC VASCULAR SURGERY)

## 2022-11-17 PROCEDURE — 4010F ACE/ARB THERAPY RXD/TAKEN: CPT | Mod: CPTII,S$GLB,, | Performed by: THORACIC SURGERY (CARDIOTHORACIC VASCULAR SURGERY)

## 2022-11-17 PROCEDURE — 1159F MED LIST DOCD IN RCRD: CPT | Mod: CPTII,S$GLB,, | Performed by: THORACIC SURGERY (CARDIOTHORACIC VASCULAR SURGERY)

## 2022-11-17 PROCEDURE — 3078F DIAST BP <80 MM HG: CPT | Mod: CPTII,S$GLB,, | Performed by: THORACIC SURGERY (CARDIOTHORACIC VASCULAR SURGERY)

## 2022-11-17 PROCEDURE — 3078F PR MOST RECENT DIASTOLIC BLOOD PRESSURE < 80 MM HG: ICD-10-PCS | Mod: CPTII,S$GLB,, | Performed by: THORACIC SURGERY (CARDIOTHORACIC VASCULAR SURGERY)

## 2022-11-17 PROCEDURE — 1160F PR REVIEW ALL MEDS BY PRESCRIBER/CLIN PHARMACIST DOCUMENTED: ICD-10-PCS | Mod: CPTII,S$GLB,, | Performed by: THORACIC SURGERY (CARDIOTHORACIC VASCULAR SURGERY)

## 2022-11-17 PROCEDURE — 99024 POSTOP FOLLOW-UP VISIT: CPT | Mod: S$GLB,,, | Performed by: THORACIC SURGERY (CARDIOTHORACIC VASCULAR SURGERY)

## 2022-11-17 PROCEDURE — 4010F PR ACE/ARB THEARPY RXD/TAKEN: ICD-10-PCS | Mod: CPTII,S$GLB,, | Performed by: THORACIC SURGERY (CARDIOTHORACIC VASCULAR SURGERY)

## 2022-11-17 PROCEDURE — 3051F PR MOST RECENT HEMOGLOBIN A1C LEVEL 7.0 - < 8.0%: ICD-10-PCS | Mod: CPTII,S$GLB,, | Performed by: THORACIC SURGERY (CARDIOTHORACIC VASCULAR SURGERY)

## 2022-11-17 PROCEDURE — 1160F RVW MEDS BY RX/DR IN RCRD: CPT | Mod: CPTII,S$GLB,, | Performed by: THORACIC SURGERY (CARDIOTHORACIC VASCULAR SURGERY)

## 2022-11-17 PROCEDURE — 1101F PR PT FALLS ASSESS DOC 0-1 FALLS W/OUT INJ PAST YR: ICD-10-PCS | Mod: CPTII,S$GLB,, | Performed by: THORACIC SURGERY (CARDIOTHORACIC VASCULAR SURGERY)

## 2022-11-17 PROCEDURE — 3051F HG A1C>EQUAL 7.0%<8.0%: CPT | Mod: CPTII,S$GLB,, | Performed by: THORACIC SURGERY (CARDIOTHORACIC VASCULAR SURGERY)

## 2022-11-17 PROCEDURE — 3066F NEPHROPATHY DOC TX: CPT | Mod: CPTII,S$GLB,, | Performed by: THORACIC SURGERY (CARDIOTHORACIC VASCULAR SURGERY)

## 2022-11-17 PROCEDURE — 1125F PR PAIN SEVERITY QUANTIFIED, PAIN PRESENT: ICD-10-PCS | Mod: CPTII,S$GLB,, | Performed by: THORACIC SURGERY (CARDIOTHORACIC VASCULAR SURGERY)

## 2022-11-17 PROCEDURE — 3066F PR DOCUMENTATION OF TREATMENT FOR NEPHROPATHY: ICD-10-PCS | Mod: CPTII,S$GLB,, | Performed by: THORACIC SURGERY (CARDIOTHORACIC VASCULAR SURGERY)

## 2022-11-17 PROCEDURE — 3060F PR POS MICROALBUMINURIA RESULT DOCUMENTED/REVIEW: ICD-10-PCS | Mod: CPTII,S$GLB,, | Performed by: THORACIC SURGERY (CARDIOTHORACIC VASCULAR SURGERY)

## 2022-11-17 PROCEDURE — 1125F AMNT PAIN NOTED PAIN PRSNT: CPT | Mod: CPTII,S$GLB,, | Performed by: THORACIC SURGERY (CARDIOTHORACIC VASCULAR SURGERY)

## 2022-11-17 PROCEDURE — 1101F PT FALLS ASSESS-DOCD LE1/YR: CPT | Mod: CPTII,S$GLB,, | Performed by: THORACIC SURGERY (CARDIOTHORACIC VASCULAR SURGERY)

## 2022-11-17 PROCEDURE — 3288F PR FALLS RISK ASSESSMENT DOCUMENTED: ICD-10-PCS | Mod: CPTII,S$GLB,, | Performed by: THORACIC SURGERY (CARDIOTHORACIC VASCULAR SURGERY)

## 2022-11-17 PROCEDURE — 1159F PR MEDICATION LIST DOCUMENTED IN MEDICAL RECORD: ICD-10-PCS | Mod: CPTII,S$GLB,, | Performed by: THORACIC SURGERY (CARDIOTHORACIC VASCULAR SURGERY)

## 2022-11-17 PROCEDURE — 99024 PR POST-OP FOLLOW-UP VISIT: ICD-10-PCS | Mod: S$GLB,,, | Performed by: THORACIC SURGERY (CARDIOTHORACIC VASCULAR SURGERY)

## 2022-11-17 PROCEDURE — 3288F FALL RISK ASSESSMENT DOCD: CPT | Mod: CPTII,S$GLB,, | Performed by: THORACIC SURGERY (CARDIOTHORACIC VASCULAR SURGERY)

## 2022-11-17 PROCEDURE — 3074F SYST BP LT 130 MM HG: CPT | Mod: CPTII,S$GLB,, | Performed by: THORACIC SURGERY (CARDIOTHORACIC VASCULAR SURGERY)

## 2022-11-17 PROCEDURE — 99999 PR PBB SHADOW E&M-EST. PATIENT-LVL IV: ICD-10-PCS | Mod: PBBFAC,,, | Performed by: THORACIC SURGERY (CARDIOTHORACIC VASCULAR SURGERY)

## 2022-11-17 PROCEDURE — 3060F POS MICROALBUMINURIA REV: CPT | Mod: CPTII,S$GLB,, | Performed by: THORACIC SURGERY (CARDIOTHORACIC VASCULAR SURGERY)

## 2022-11-17 NOTE — PROGRESS NOTES
Patient presents S/p Aortography with lower extremity runoff and selective left lower extremity angiography with angioplasty of the left popliteal artery at the level of the knee with a 6 mm angioplasty balloon. Post-op he has not had any surgery related complications. Cellulitis is appreciated to his lower extremity. His procedural sites are CDI. I am prescribing amoxicillin and Doxycycline for 2-weeks since he is allergic to Bactrim. He is to follow-up with Dr. Grajeda next week.     JANNA Germain-BC

## 2022-11-21 ENCOUNTER — PATIENT MESSAGE (OUTPATIENT)
Dept: INFECTIOUS DISEASES | Facility: CLINIC | Age: 77
End: 2022-11-21
Payer: MEDICARE

## 2022-11-24 PROBLEM — J96.01 ACUTE RESPIRATORY FAILURE WITH HYPOXIA: Status: ACTIVE | Noted: 2022-11-24

## 2022-11-24 PROBLEM — S89.92XA LEFT KNEE INJURY: Status: ACTIVE | Noted: 2022-11-24

## 2022-11-24 PROBLEM — E11.9 TYPE 2 DIABETES MELLITUS: Status: ACTIVE | Noted: 2022-11-24

## 2022-11-24 PROBLEM — R65.10 SIRS (SYSTEMIC INFLAMMATORY RESPONSE SYNDROME): Status: ACTIVE | Noted: 2022-11-24

## 2022-11-24 PROBLEM — Z01.818 PRE-OP EVALUATION: Status: ACTIVE | Noted: 2022-11-24
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
## 2022-11-24 PROBLEM — J18.9 PNEUMONIA: Status: ACTIVE | Noted: 2022-11-24

## 2022-11-24 PROBLEM — G47.33 OSA ON CPAP: Status: ACTIVE | Noted: 2022-11-24

## 2022-11-24 PROBLEM — R73.03 PREDIABETES: Status: ACTIVE | Noted: 2022-11-24

## 2022-11-24 PROBLEM — L03.116 CELLULITIS OF BOTH LOWER EXTREMITIES: Status: ACTIVE | Noted: 2022-11-24

## 2022-11-24 PROBLEM — I10 ESSENTIAL HYPERTENSION: Status: ACTIVE | Noted: 2022-11-24

## 2022-11-24 PROBLEM — L03.115 CELLULITIS OF BOTH LOWER EXTREMITIES: Status: ACTIVE | Noted: 2022-11-24

## 2022-11-24 PROBLEM — I73.9 PVD (PERIPHERAL VASCULAR DISEASE): Status: ACTIVE | Noted: 2022-11-24

## 2022-11-24 PROBLEM — E83.42 HYPOMAGNESEMIA: Status: ACTIVE | Noted: 2022-11-24

## 2022-11-24 PROBLEM — N18.9 ACUTE KIDNEY INJURY SUPERIMPOSED ON CHRONIC KIDNEY DISEASE: Status: ACTIVE | Noted: 2022-11-24

## 2022-11-24 PROBLEM — Z71.89 ACP (ADVANCE CARE PLANNING): Status: ACTIVE | Noted: 2022-11-24

## 2022-11-24 PROBLEM — S72.002A FRACTURE OF FEMORAL NECK, LEFT, CLOSED: Status: ACTIVE | Noted: 2022-11-24

## 2022-11-24 PROBLEM — J45.909 ASTHMA: Status: ACTIVE | Noted: 2022-11-24

## 2022-11-24 PROBLEM — N17.9 ACUTE KIDNEY INJURY SUPERIMPOSED ON CHRONIC KIDNEY DISEASE: Status: ACTIVE | Noted: 2022-11-24

## 2023-01-04 ENCOUNTER — CLINICAL SUPPORT (OUTPATIENT)
Dept: REHABILITATION | Facility: HOSPITAL | Age: 78
End: 2023-01-04
Attending: ORTHOPAEDIC SURGERY
Payer: MEDICARE

## 2023-01-04 DIAGNOSIS — S72.002D CLOSED FRACTURE OF NECK OF LEFT FEMUR WITH ROUTINE HEALING, SUBSEQUENT ENCOUNTER: ICD-10-CM

## 2023-01-04 PROCEDURE — 97162 PT EVAL MOD COMPLEX 30 MIN: CPT | Mod: PN

## 2023-01-04 NOTE — PLAN OF CARE
[facility]   Physical Therapy Initial Evaluation     Date: 1/4/2023   Name: Rojelio Dye .  Clinic Number: [id number]    Therapy Diagnosis:   Encounter Diagnosis   Name Primary?    Closed fracture of neck of left femur with routine healing, subsequent encounter      Physician: Ana Espinal MD    Physician Orders: Gait Training Therapeutic Exercise Comment - PWB L   Gait Training   Medical Diagnosis from Referral: S72.002D (ICD-10-CM) - Closed fracture of neck of left femur with routine healing, subsequent encounter   Evaluation Date: 1/4/2023  Authorization Period Expiration: 12/31/2023  Plan of Care Expiration: 2/8/2023  Progress Note Due: 10th visit  Visit # / Visits authorized: 1/ pending auth    FOTO: 1/10    Precautions: Standard, Fall, and Weightbearing PWB on L LE     Time In: 1:00pm  Time Out: 1:47pm  Total Appointment Time (timed & untimed codes): 47 minutes      SUBJECTIVE     Date of onset: 11/26/2023 Fall with Femur Fx; 11/28/2023 Surgical Pinning to L Femoral Neck     History of current condition - Trevin reports: he was working in his garage and had a fall while attempting to clean his truck.  He turned to walk while carrying a bucket and slipped on the water and soap that came from the bucket, and he fell on his left side.  He fractured his left femur and injured his left shoulder at the same time.      Falls: One as reported above; none recent after that episode     Imaging, xrays:     EXAMINATION:  XR HIP WITH PELVIS WHEN PERFORMED, 2 OR 3 VIEWS LEFT     CLINICAL HISTORY:  Follow-up ORIF left hip.     TECHNIQUE:  AP and lateral radiographs of the left hip.     COMPARISON:  11/23/2022     FINDINGS:  There is interval plate and screw fixation of left femoral neck fracture.  Alignment appears near anatomic.  Hardware appears intact.  There is no subluxation or dislocation.  There is severe femoroacetabular joint space narrowing.  There is marginal osteophytosis.      Impression:     1. ORIF changes fixating left femoral neck fracture in near anatomic alignment.  Hardware is intact.  2. Severe degenerative changes of the left hip.       Prior Therapy: SNF at [facility], and then Home Health   Social History:  lives with their spouse  Occupation: Retired   Prior Level of Function: I with ADLs and MRADLs   Current Level of Function: Ambulates with RW and difficulty with other MRADLs     Pain:  Current 6/10, worst 9/10, best 3/10   Location: left upper legs generalized   Description: Aching and Variable  Aggravating Factors: Standing and Walking  Easing Factors: rest    Patients goals: Make sure he is doing the right exercises and getting around the right way.       Medical History:   Past Medical History:   Diagnosis Date    Acid reflux     Acquired hammer toe of right foot 8/29/2014    Arthritis     Arthritis of foot 12/9/2015    Asthma     Benign essential hypertension 4/23/2014    BPH (benign prostatic hyperplasia)     BPH with urinary obstruction 12/10/2015    CKD (chronic kidney disease), stage II 4/23/2014    Followed by Dr. Rodriguez Brandt    CKD (chronic kidney disease), stage III     COVID-19     8/2021, 1/2022    Diabetic Charcot foot     Dyslipidemia     Encounter for blood transfusion     GERD (gastroesophageal reflux disease)     Gout     Hammer toe     HAV (hallux abducto valgus)     [place] (hard of hearing)     Hypercholesterolemia without hypertriglyceridemia 7/17/2004    Hypertension     Inflammatory osteoarthritis     Mild intermittent asthma without complication 4/23/2014    Narcolepsy     ADRIANNA on CPAP 4/23/2014    ADRIANNA on CPAP     Osteoarthrosis involving, or with mention of more than one site, but not specified as generalized, multiple sites 12/28/2011    Pancytopenia 2/15/2022    Pancytopenia     Pericarditis     Pes planovalgus 5/5/2014    Pre-diabetes 4/23/2014    PVD (peripheral vascular disease)     Recurrent cellulitis     RLS (restless legs syndrome)      Schwannoma     T vert body    Schwannoma     Type 2 diabetes mellitus     Venous insufficiency     Venous stasis dermatitis of both lower extremities        Surgical History:   Rojelio GLOVER Marnie Tonya  has a past surgical history that includes Adenoidectomy; Foot surgery (04/22/2014); Decompression of cervical spine by anterior approach with fusion (N/A, 12/22/2018); Fusion of cervical spine by posterior approach (N/A, 12/22/2018); cataracts surgery; Aortography with serialography (Bilateral, 10/24/2022); Percutaneous transluminal angioplasty (N/A, 10/24/2022); Foot surgery; Decompression of cervical spine by anterior approach with fusion; Fusion of cervical spine by posterior approach; Aortography with serialography; Percutaneous transluminal angioplasty (PTA) of peripheral vessel; Hernia repair; Prostate surgery; Cataract extraction; Cochlear implant revision; Tonsillectomy; Vasectomy; and Pinning of hip (Left, 11/26/2022).    Medications:   Rojelio has a current medication list which includes the following prescription(s): acetaminophen, acetaminophen, albuterol, allopurinol, allopurinol, amoxicillin, aspirin, aspirin, blood-glucose meter, carvedilol, carvedilol, chlorhexidine, cholecalciferol (vitamin d3), cholecalciferol (vitamin d3), clonidine, clonidine, clonidine, clonidine, clonidine, doxazosin, doxazosin, doxycycline, bisacodyl, duloxetine, duloxetine, famotidine, famotidine, fluticasone propionate, furosemide, furosemide, gabapentin, gabapentin, glucosamine-chond-msm complex, glucosamine-chondroitin, hydrocodone-acetaminophen, hydrocodone-acetaminophen, insulin aspart u-100, levemir flextouch u-100 insuln, levemir flextouch u-100 insuln, lidocaine, menthol/camphor/irr cntr-irtn1, miconazole, mitigare, centrum silver men, multivitamin with minerals, mupirocin, mupirocin, novolog flexpen u-100 insulin, oxybutynin, oxybutynin, pen needle, diabetic, rivaroxaban, ropinirole, ropinirole, simvastatin, simvastatin,  tamsulosin, true metrix glucose test strip, trueplus lancets, and [DISCONTINUED] losartan, and the following Facility-Administered Medications: epinephrine.    Allergies:   Review of patient's allergies indicates:   Allergen Reactions    Sulfa (sulfonamide antibiotics) Hives    Sulfamethoxazole-trimethoprim Rash    Mobic [meloxicam]     Sulfa (sulfonamide antibiotics)     Mobic [meloxicam] Hives          OBJECTIVE     Observation: Difficulty with transition from sit to stand from chair in waiting area  Pt drove himself to the clinic today    Gait: ambulates with RW for >150' in clinic and on concrete terrain into the clinic with mod I     Functional Tests:    30 sec Sit to stand: 4 reps with UE support     TU.77sec with RW and UE for sit to stand transfer      L Hip    AROM:   MMT:  Flexion  Aprrox. 90 degrees  4-/5  Extension  Limited 40%    3+/5  Abduction:   WFL    4-/5    Functional RANGE OF MOTION: Able to sit on edge of table and put both socks on       Limitation/Restriction for FOTO Hip Survey    Therapist reviewed FOTO scores for Rojelio GLOVER Marnie SrTonya on 2023.   FOTO documents entered into [employer] - see Media section.    Limitation Score: 51/37%         TREATMENT     Total Treatment time (time-based codes) separate from Evaluation: 00 minutes      Trevin received the treatments listed below:                PATIENT EDUCATION AND HOME EXERCISES     Education provided:   - Reviewed current HEP and PT POC     Written Home Exercises Provided: To be given at follow up appointments . Exercises were reviewed and Trevin was able to demonstrate them prior to the end of the session.  Trevin demonstrated good  understanding of the education provided. See EMR under Patient Instructions for exercises provided during therapy sessions.    ASSESSMENT     Rojelio is a 77 y.o. male referred to outpatient Physical Therapy with a medical diagnosis of S72.002D (ICD-10-CM) - Closed fracture of neck of left femur with routine  healing, subsequent encounter . Patient presents with deficits in L LE RANGE OF MOTION, strength, and WB tolerance.  Pt remains at PWB per Dr. Espinal orders on his referral and has f/u in 7 weeks with Dr. Espinal.  Pt is high risk for falls due to his PWB and his other comorbidities and would benefit from PT for strengthening, endurance, and functional mobility skills training to promote max and safe level of functioning in his home environment.      Patient prognosis is Good.   Patientt will benefit from skilled outpatient Physical Therapy to address the deficits stated above and in the chart below, provide patient /family education, and to maximize patientt's level of independence.     Plan of care discussed with patient: Yes  Patient's spiritual, cultural and educational needs considered and patient is agreeable to the plan of care and goals as stated below:     Anticipated Barriers for therapy: PWB and multiple comorbidities     Medical Necessity is demonstrated by the following  History  Co-morbidities and personal factors that may impact the plan of care Co-morbidities:   advanced age, level of undertstanding of current condition, and see medical history for further details     Personal Factors:   age  lifestyle     moderate   Examination  Body Structures and Functions, activity limitations and participation restrictions that may impact the plan of care Body Regions:   neck  lower extremities    Body Systems:    ROM  strength  gross coordinated movement  balance  gait  transfers  transitions    Participation Restrictions:   PWB on L LE     Activity limitations:   Learning and applying knowledge  no deficits    General Tasks and Commands  no deficits    Communication  no deficits    Mobility  lifting and carrying objects  walking  moving around using equipment (WC)    Self care  looking after one's health    Domestic Life  shopping  cooking  doing house work (cleaning house, washing dishes, laundry)  assisting  others    Interactions/Relationships  no deficits    Life Areas  no deficits    Community and Social Life  no deficits         moderate   Clinical Presentation evolving clinical presentation with changing clinical characteristics moderate   Decision Making/ Complexity Score: moderate     Goals:  Short Term Goals: 3 weeks   I with HEP for overall mobility, strength, and endurance for autonomy of care and increased [place]   Pt to ambulate with RW x 500' or greater without rest breaks    Long Term Goals: 5 weeks   Improve FOTO score to 37% limitation from 51% at IE for improved functional capacity  Pt to walk unlimited distances with least restrictive AD for increased independence to tolerance of MRADLs   Improve strength in weak mm by 1/2 MMG or better for increased tolerance to ADLs and MRADLs and mitigate risk for falls.     PLAN   Plan of care Certification: 1/4/2023 to 2/8/2021.    Outpatient Physical Therapy 2 times weekly for 3 weeks then 1x week for 2 weeks to include the following interventions: Gait Training, Manual Therapy, Moist Heat/ Ice, Neuromuscular Re-ed, Patient Education, Self Care, Therapeutic Activities, and Therapeutic Exercise.     Ellyn Gould, PT      I CERTIFY THE NEED FOR THESE SERVICES FURNISHED UNDER THIS PLAN OF TREATMENT AND WHILE UNDER MY CARE   Physician's comments:     Physician's Signature: ___________________________________________________

## 2023-01-06 ENCOUNTER — CLINICAL SUPPORT (OUTPATIENT)
Dept: REHABILITATION | Facility: HOSPITAL | Age: 78
End: 2023-01-06
Payer: MEDICARE

## 2023-01-06 DIAGNOSIS — R53.1 WEAKNESS GENERALIZED: ICD-10-CM

## 2023-01-06 DIAGNOSIS — S72.002D CLOSED FRACTURE OF NECK OF LEFT FEMUR WITH ROUTINE HEALING, SUBSEQUENT ENCOUNTER: Primary | ICD-10-CM

## 2023-01-06 DIAGNOSIS — R26.9 GAIT DISTURBANCE: ICD-10-CM

## 2023-01-06 PROCEDURE — 97110 THERAPEUTIC EXERCISES: CPT | Mod: PN

## 2023-01-06 NOTE — PROGRESS NOTES
[facility]   Physical Therapy Treatment Note     Name: Rojelio Dye Tonya  Clinic Number: [id number]    Therapy Diagnosis:   Encounter Diagnoses   Name Primary?    Closed fracture of neck of left femur with routine healing, subsequent encounter Yes    Weakness generalized     Gait disturbance      Physician: Ana Espinal MD    Visit Date: 1/6/2023  Physician Orders: Gait Training Therapeutic Exercise Comment - PWB L   Gait Training   Medical Diagnosis from Referral: S72.002D (ICD-10-CM) - Closed fracture of neck of left femur with routine healing, subsequent encounter   Evaluation Date: 1/4/2023  Authorization Period Expiration: 12/31/2023  Plan of Care Expiration: 2/8/2023  Progress Note Due: 10th visit  Visit # / Visits authorized: 1/ pending auth    FOTO: 1/10     Precautions: Standard, Fall, and Weightbearing PWB on L LE     Time In: 10:38am  Time Out: 11:25pm  Total Billable Time: 47 minutes    PTA Visit #: 0/5       SUBJECTIVE     Pt reports: he was sore in his R ankle after the evaluation due to some of the movements he had to do.     He was compliant with home exercise program.    Response to previous treatment: Appropriate   Functional change: None as of yet     Pain: 8/10  Location: left hip       OBJECTIVE     Objective Measures updated at progress report unless specified.     Treatment       Trevin received the treatments listed below:      therapeutic exercises to develop strength, endurance, and ROM for 47 minutes including:    Aerobic activity and endurance training for reciprocal motion of lower limbs on Nustep for 5 minutes at Level 1.0 at > or equal to 50 spm w/ rest to increase AROM in knees and hips, blood flow and improve tissue tolerance.     LAQs with 2# 2 x10 reps with 3 sec hold   Supine HSS 10 x 10sec hold   SLR 2 x 10 with 3 sec hold  Supine Hip AD ball squeeze 2 x 10 reps with 3 sec hold   SL Hip AB 2 x 10 reps with 3 sec hold  SL Clamshells 2 x 10 reps  with 3 sec hold                 Patient Education and Home Exercises     Home Exercises Provided and Patient Education Provided     Education provided:   - HEP     Written Home Exercises Provided: yes. Exercises were reviewed and Trevin was able to demonstrate them prior to the end of the session.  Trevin demonstrated good  understanding of the education provided. See EMR under Patient Instructions for exercises provided during therapy sessions    ASSESSMENT   Patient had difficulty with recumbent bike due to his foot and ankle deformities from his Charcot foot and prominent OA in ankles and knees.  He tolerated there ex fairly well without incident and was able to complete there ex with increased time needed for rest breaks.      Trevin Is progressing well towards his goals.   Pt prognosis is Good.     Pt will continue to benefit from skilled outpatient physical therapy to address the deficits listed in the problem list box on initial evaluation, provide pt/family education and to maximize pt's level of independence in the home and community environment.     Pt's spiritual, cultural and educational needs considered and pt agreeable to plan of care and goals.     Anticipated barriers to physical therapy: None     Goals:     Short Term Goals: 3 weeks   I with HEP for overall mobility, strength, and endurance for autonomy of care and increased [place]   Pt to ambulate with RW x 500' or greater without rest breaks     Long Term Goals: 5 weeks   Improve FOTO score to 37% limitation from 51% at IE for improved functional capacity  Pt to walk unlimited distances with least restrictive AD for increased independence to tolerance of MRADLs   Improve strength in weak mm by 1/2 MMG or better for increased tolerance to ADLs and MRADLs and mitigate risk for falls.     PLAN     Continue to progress strength and endurance as tolerated.      Ellyn Gould, PT

## 2023-02-16 PROBLEM — I87.2 PERIPHERAL VENOUS INSUFFICIENCY: Status: ACTIVE | Noted: 2020-08-24

## 2023-02-16 PROBLEM — Z82.49 FAMILY HISTORY OF PREMATURE CORONARY HEART DISEASE: Status: ACTIVE | Noted: 2019-06-12

## 2023-02-16 PROBLEM — M06.9 RHEUMATOID ARTHRITIS: Status: ACTIVE | Noted: 2019-06-12

## 2023-02-16 PROBLEM — L98.499 SKIN ULCER: Status: ACTIVE | Noted: 2019-12-06

## 2023-02-16 PROBLEM — L97.909 CHRONIC ULCER OF LOWER EXTREMITY: Status: ACTIVE | Noted: 2019-12-06

## 2023-02-16 PROBLEM — I99.8 VASCULAR INSUFFICIENCY: Status: ACTIVE | Noted: 2020-02-07

## 2023-02-16 PROBLEM — E78.5 DYSLIPIDEMIA: Status: ACTIVE | Noted: 2020-02-07

## 2023-02-16 PROBLEM — R00.1 SINUS BRADYCARDIA: Status: ACTIVE | Noted: 2022-03-17

## 2023-02-16 PROBLEM — M12.9 ARTHROPATHY: Status: ACTIVE | Noted: 2019-06-12

## 2023-02-27 PROBLEM — N18.9 ACUTE KIDNEY INJURY SUPERIMPOSED ON CHRONIC KIDNEY DISEASE: Status: RESOLVED | Noted: 2022-11-24 | Resolved: 2023-02-27

## 2023-02-27 PROBLEM — J18.9 PNEUMONIA: Status: RESOLVED | Noted: 2022-11-24 | Resolved: 2023-02-27

## 2023-02-27 PROBLEM — J96.01 ACUTE RESPIRATORY FAILURE WITH HYPOXIA: Status: RESOLVED | Noted: 2022-11-24 | Resolved: 2023-02-27

## 2023-02-27 PROBLEM — N17.9 ACUTE KIDNEY INJURY SUPERIMPOSED ON CHRONIC KIDNEY DISEASE: Status: RESOLVED | Noted: 2022-11-24 | Resolved: 2023-02-27

## 2024-02-09 ENCOUNTER — TELEPHONE (OUTPATIENT)
Dept: NEUROSURGERY | Facility: CLINIC | Age: 79
End: 2024-02-09
Payer: MEDICARE

## 2024-02-09 NOTE — TELEPHONE ENCOUNTER
----- Message from Jose Luis rDew sent at 2/9/2024 11:04 AM CST -----  Regarding: Sooner Appt  Contact: Wife  Type:  Sooner Appointment Request    Caller is requesting a sooner appointment.  Caller declined first available appointment listed below.  Caller will not accept being placed on the waitlist and is requesting a message be sent to doctor.    Name of Caller:Pts  wife Janene    When is the first available appointment?unable to book    Symptoms:numbness and tingling right foot and hand / pain down leg (same thing that happened last time when he had Emergency sx by Dr Mao)    Would the patient rather a call back or a response via [facility]? Call back    Best Call Back Number:  [phone]    Additional Information: Sts he needs to be seen asap as last time this happened they rushed him into surgery and didn't let him go home.   Please advise -- Thank you

## 2024-02-12 ENCOUNTER — OFFICE VISIT (OUTPATIENT)
Dept: NEUROSURGERY | Facility: CLINIC | Age: 79
End: 2024-02-12
Payer: MEDICARE

## 2024-02-12 ENCOUNTER — HOSPITAL ENCOUNTER (OUTPATIENT)
Dept: RADIOLOGY | Facility: HOSPITAL | Age: 79
Discharge: HOME | End: 2024-02-12
Attending: PHYSICIAN ASSISTANT
Payer: MEDICARE

## 2024-02-12 ENCOUNTER — PATIENT MESSAGE (OUTPATIENT)
Dept: NEUROSURGERY | Facility: CLINIC | Age: 79
End: 2024-02-12

## 2024-02-12 VITALS
HEART RATE: 51 BPM | HEIGHT: 67 IN | DIASTOLIC BLOOD PRESSURE: 81 MMHG | WEIGHT: 156.06 LBS | SYSTOLIC BLOOD PRESSURE: 166 MMHG | BODY MASS INDEX: 24.49 KG/M2

## 2024-02-12 DIAGNOSIS — M54.12 CERVICAL RADICULOPATHY: ICD-10-CM

## 2024-02-12 DIAGNOSIS — M54.2 CERVICALGIA: Primary | ICD-10-CM

## 2024-02-12 DIAGNOSIS — M54.16 LUMBAR RADICULOPATHY, CHRONIC: ICD-10-CM
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
## 2024-02-12 PROCEDURE — 70450 CT HEAD/BRAIN W/O DYE: CPT | Mod: 26,,, | Performed by: RADIOLOGY

## 2024-02-12 PROCEDURE — 1100F PTFALLS ASSESS-DOCD GE2>/YR: CPT | Mod: CPTII,S$GLB,, | Performed by: PHYSICIAN ASSISTANT

## 2024-02-12 PROCEDURE — 72110 X-RAY EXAM L-2 SPINE 4/>VWS: CPT | Mod: TC,FY,PO

## 2024-02-12 PROCEDURE — 3079F DIAST BP 80-89 MM HG: CPT | Mod: CPTII,S$GLB,, | Performed by: PHYSICIAN ASSISTANT

## 2024-02-12 PROCEDURE — 3288F FALL RISK ASSESSMENT DOCD: CPT | Mod: CPTII,S$GLB,, | Performed by: PHYSICIAN ASSISTANT

## 2024-02-12 PROCEDURE — 72050 X-RAY EXAM NECK SPINE 4/5VWS: CPT | Mod: TC,FY,PO

## 2024-02-12 PROCEDURE — 3077F SYST BP >= 140 MM HG: CPT | Mod: CPTII,S$GLB,, | Performed by: PHYSICIAN ASSISTANT

## 2024-02-12 PROCEDURE — 72110 X-RAY EXAM L-2 SPINE 4/>VWS: CPT | Mod: 26,,, | Performed by: RADIOLOGY

## 2024-02-12 PROCEDURE — 72050 X-RAY EXAM NECK SPINE 4/5VWS: CPT | Mod: 26,,, | Performed by: RADIOLOGY

## 2024-02-12 PROCEDURE — 99214 OFFICE O/P EST MOD 30 MIN: CPT | Mod: S$GLB,,, | Performed by: PHYSICIAN ASSISTANT

## 2024-02-12 PROCEDURE — 1125F AMNT PAIN NOTED PAIN PRSNT: CPT | Mod: CPTII,S$GLB,, | Performed by: PHYSICIAN ASSISTANT

## 2024-02-12 PROCEDURE — 70450 CT HEAD/BRAIN W/O DYE: CPT | Mod: TC,PO

## 2024-02-12 RX ORDER — LOSARTAN POTASSIUM 25 MG/1
1 TABLET ORAL DAILY
COMMUNITY

## 2024-02-12 NOTE — PROGRESS NOTES
Neurosurgery History and Physical    Patient ID: Rojelio Dye Sr. is a 78 y.o. male.    Chief Complaint   Patient presents with    Lumbar Spine Pain (L-Spine)     Sciatic pain numbness and tingling upper and lower right hand side       HPI 2/12/24:  Patient is a 78 year old with a complex medical history including severe RA, Charcot foot bilaterally, DM2, CKD, sp C3-5 ACDF 2018 with Dr. Mao who presents to [facility] with 1.5 weeks of right hand numbness and pain and right leg pain radiating into his right heel. There was no incident or fall at that time that could have caused his symptoms. His wife reports multiple falls, however, with the most recent being January 21 where he fell from a seated position and landed face/head first. He sustained significant bruising and continues to have headaches that he has never had before. He is also reporting orbital pain that is new, per his wife. They did not seek treatment for this fall. He has vision issues, being monitored for macular degeneration with limited vision in the right eye and cataracts. Unsure if vision has worsened in the last 3 weeks. He has these falls because he is falling asleep. After an illness a few weeks ago he has been unable to use his CPAP consistently and he is being evaluated for BiPAP, but this is what they attribute his daytime somnolence to. He reports pain shooting from the right neck down the right arm into the first 3 fingers of his right hand. He has no left sided symptoms. This happens randomly throughout the day and while driving. He usually shakes it out and it improves. He cannot lay down due to neck range of motion or to find a comfortable position. He is also reporting right buttocks pain that radiates down into the right posterior heel. He has severe diabetic neuropathy with Charcot foot, status post 3 surgeries with poor outcomes. He typically walks with a cane, walker, or his electric scooter, but their house isn't  ADA compliant which makes it difficult. He doesn't have any assistive devices today with him. His wife states because he just falls asleep, the assistive devices don't really prevent his falls. He has a significant vascular history with multiple stents, angioplasty, and issues with edema and severe wounds on his legs. He was seeing wound care, but his wife mostly takes care of his blistering edema. They are typically in and out of the hospital with cellulitis of the lower extremities, 6 times last year. He has significant trouble walking from his foot and ankle deformity, but doesn't feel dizzy. He feels weak in general, takes great effort to go from a seated to standing position having to rely on the right UE to lift him and his wife to support him from his belt loop. He has a known left rotator cuff tear and surgical intervention is not an option.     They mostly presented today as the last time he had similar symptoms, he ended up having emergency cervical spine fusion as he had significant cord compression and myelopathy that was missed by an initial provider. They do not believe he is a surgical candidate unless it is emergently required.     Review of Systems   Musculoskeletal:  Positive for arthralgias, back pain, gait problem, joint swelling, myalgias, neck pain and neck stiffness.   Skin:  Positive for color change and wound.   Neurological:  Positive for weakness, numbness and headaches. Negative for dizziness, syncope, facial asymmetry and speech difficulty.   Psychiatric/Behavioral:  Positive for sleep disturbance. Negative for confusion.    All other systems reviewed and are negative.      Past Medical History:   Diagnosis Date    Acid reflux     Acquired hammer toe of right foot 08/29/2014    Arthritis     Arthritis of foot 12/09/2015    Asthma     Benign essential hypertension 04/23/2014    BPH (benign prostatic hyperplasia)     BPH with urinary obstruction 12/10/2015    CKD (chronic kidney disease),  stage II 04/23/2014    Followed by Dr. Rodriguez Brandt    CKD (chronic kidney disease), stage III     COVID-19     8/2021, 1/2022    Diabetic Charcot foot     Dyslipidemia     Encounter for blood transfusion     GERD (gastroesophageal reflux disease)     Gout     Hammer toe     HAV (hallux abducto valgus)     [place] (hard of hearing)     Hypercholesterolemia without hypertriglyceridemia 07/17/2004    Hypertension     Inflammatory osteoarthritis     Mild intermittent asthma without complication 04/23/2014    Narcolepsy     ADRIANNA on CPAP 04/23/2014    ADRIANNA on CPAP     Osteoarthrosis involving, or with mention of more than one site, but not specified as generalized, multiple sites 12/28/2011    PAD (peripheral artery disease)     Pancytopenia 02/15/2022    Pancytopenia     Pericarditis     Pes planovalgus 05/05/2014    Pre-diabetes 04/23/2014    PVD (peripheral vascular disease)     Recurrent cellulitis     RLS (restless legs syndrome)     Schwannoma     T vert body    Schwannoma     Type 2 diabetes mellitus     Venous insufficiency     Venous stasis dermatitis of both lower extremities      Social History     Socioeconomic History    Marital status:    Tobacco Use    Smoking status: Never    Smokeless tobacco: Never    Tobacco comments:     2nd Hand -Father smoked 3 packs a day   Substance and Sexual Activity    Alcohol use: Yes     Comment: rarely    Drug use: Never    Sexual activity: Not Currently     Partners: Female     Birth control/protection: None   Social History Narrative    ** Merged History Encounter **          Family History   Problem Relation Age of Onset    Heart failure Mother     Osteoporosis Mother     Heart disease Father     Rheum arthritis Neg Hx     Lupus Neg Hx      Review of patient's allergies indicates:   Allergen Reactions    Sulfa (sulfonamide antibiotics) Hives    Sulfamethoxazole-trimethoprim Rash    Mobic [meloxicam]     Sulfa (sulfonamide antibiotics)     Mobic [meloxicam] Hives        Current Outpatient Medications:     acetaminophen (TYLENOL) 650 MG TbSR, Take 650 mg by mouth 2 (two) times a day., Disp: , Rfl:     albuterol (VENTOLIN HFA) 90 mcg/actuation inhaler, Inhale 2 puffs into the lungs every 6 (six) hours as needed for Wheezing. Rescue, Disp: 8 g, Rfl: 1    allopurinoL (ZYLOPRIM) 300 MG tablet, TAKE 1 TABLET BY MOUTH ONCE EVERY DAY, Disp: 90 tablet, Rfl: 0    aspirin (ECOTRIN) 81 MG EC tablet, Take 81 mg by mouth every evening., Disp: , Rfl:     azelastine (ASTELIN) 137 mcg (0.1 %) nasal spray, 1 puff in each nostril; 137 MCG/SPRAY; Twice a day; 30 day(s), Disp: , Rfl:     budesonide-glycopyr-formoterol (BREZTRI AEROSPHERE) 160-9-4.8 mcg/actuation HFAA, Inhale 2 Inhalations into the lungs 2 (two) times a day., Disp: 10.7 g, Rfl: 1    carvediloL (COREG) 12.5 MG tablet, Take 12.5 mg by mouth 2 (two) times daily., Disp: , Rfl:     cholecalciferol, vitamin D3, 125 mcg (5,000 unit) Tab, Take 5,000 Units by mouth every 14 (fourteen) days., Disp: , Rfl:     cloNIDine (CATAPRES) 0.2 MG tablet, Take 1 tablet by mouth once daily., Disp: , Rfl:     doxazosin (CARDURA) 8 MG Tab, Take 8 mg by mouth every evening., Disp: , Rfl:     DULoxetine (CYMBALTA) 30 MG capsule, TAKE 1 CAPSULE EVERY DAY, Disp: 90 capsule, Rfl: 3    famotidine (PEPCID) 20 MG tablet, TAKE 1 TABLET BY MOUTH TWICE DAILY FOR THE STOMACH, Disp: 60 tablet, Rfl: 11    fluticasone propionate (FLONASE) 50 mcg/actuation nasal spray, USE 2 SPRAYS IN EACH NOSTRIL ONCE EVERY DAY, Disp: 16 g, Rfl: 11    furosemide (LASIX) 40 MG tablet, TAKE 1 TABLET BY MOUTH ONCE EVERY DAY FOR FLUID, Disp: 30 tablet, Rfl: 11    gabapentin (NEURONTIN) 300 MG capsule, Take 300 mg by mouth 3 (three) times daily. Takes mostly BID, sometimes TID, Disp: , Rfl:     glucosam-chond-msm1-C-destiney-bor (GLUCOSAMINE-CHOND-MSM COMPLEX) 375-500-15-0.5 mg Tab, Take 1 tablet by mouth once daily., Disp: , Rfl:     glucosamine-chondroitin 500-400 mg tablet, Take 1 tablet by  "mouth Daily., Disp: , Rfl:     HYDROcodone-acetaminophen (NORCO)  mg per tablet, Take 1 tablet by mouth every 6 (six) hours as needed for Pain. moderate pain, Disp: , Rfl:     HYDROcodone-acetaminophen (NORCO)  mg per tablet, Take 1 tablet by mouth every 12 (twelve) hours as needed for Pain. Greater than 7 day supply medically necessary, Disp: 40 tablet, Rfl: 0    insulin degludec (TRESIBA FLEXTOUCH U-100) 100 unit/mL (3 mL) insulin pen, Inject 26 Units into the skin once daily., Disp: 3 mL, Rfl: 11    LEVEMIR FLEXTOUCH U-100 INSULN 100 unit/mL (3 mL) InPn pen, INJECT 26 UNITS INTO THE SKIN ONCE DAILY, Disp: 15 mL, Rfl: 5    LIDOcaine 4 % PtMd, Apply 1 patch topically as needed (pain)., Disp: , Rfl:     menthol/camphor/irr cntr-irtn1 (NICOLA NATURAL PAIN RELIEVING TOP), Apply 1 application topically 3 (three) times daily. , Disp: , Rfl:     multivit-min-FA-lycopen-lutein (CENTRUM SILVER MEN) 300-600-300 mcg Tab, Take 1 tablet by mouth once daily., Disp: , Rfl:     multivitamin with minerals tablet, Take 1 tablet by mouth once daily. , Disp: , Rfl:     mupirocin (BACTROBAN) 2 % ointment, Apply topically 3 (three) times daily. (Patient taking differently: Apply topically once daily.), Disp: 30 g, Rfl: 5    NOVOLOG FLEXPEN U-100 INSULIN 100 unit/mL (3 mL) InPn pen, PER SLIDING SCALE: 0 UNITS IF BLOOD SUGAR LESS THAN 150, 2U -200, 4U -250, 6U -300, 8U -350, 10U -400, 12U IF ABOVE 400, Disp: 15 mL, Rfl: 2    oxybutynin (DITROPAN) 5 MG Tab, TAKE 1 TABLET BY MOUTH TWICE DAILY FOR BLADDER CONTROL, Disp: 60 tablet, Rfl: 1    pen needle, diabetic 32 gauge x 5/32" Ndle, USE WITH INSULIN PENS AS DIRECTED BY PHYSICIAN, Disp: 100 each, Rfl: 2    rOPINIRole (REQUIP) 3 MG tablet, TAKE 1 TABLET BY MOUTH EVERY EVENING, Disp: 30 tablet, Rfl: 5    sildenafiL (VIAGRA) 100 MG tablet, Take 1 tablet by mouth daily as needed., Disp: , Rfl:     simvastatin (ZOCOR) 40 MG tablet, TAKE 1 TABLET BY MOUTH " "AT BEDTIME FOR CHOLESTEROL (Patient taking differently: Take 40 mg by mouth every evening.), Disp: 90 tablet, Rfl: 1    tadalafiL (CIALIS) 20 MG Tab, 1/2 - 1 prn sexual activity, Disp: 10 tablet, Rfl: 11    triamcinolone acetonide 0.1% (KENALOG) 0.1 % cream, Apply 454 g topically 2 (two) times daily., Disp: , Rfl:     TRUE METRIX GLUCOSE TEST STRIP Strp, USE TO CHECK BLOOD GLUCOSE 4 TIMES A DAY, Disp: 100 strip, Rfl: 5    TRUEPLUS LANCETS 30 gauge Misc, USE TO TEST BLOOD SUGAR 4 TIMES A DAY, Disp: 100 each, Rfl: 5    blood-glucose meter kit, To check BG 4 times daily, to use with insurance preferred meter, Disp: 1 each, Rfl: 1    losartan (COZAAR) 25 MG tablet, Take 1 tablet by mouth once daily., Disp: , Rfl:     Current Facility-Administered Medications:     EPINEPHrine (EPIPEN) 0.3 mg/0.3 mL pen injection 0.3 mg, 0.3 mg, Intramuscular, PRN, KEENAN Norris, JEFFP-C  Blood pressure (!) 166/81, pulse (!) 51, height 5' 7" (1.702 m), weight 70.8 kg (156 lb 1.4 oz).      Neurologic Exam     Mental Status   Oriented to person, place, and time.   Speech: speech is normal   Level of consciousness: drowsy, arousable by tactile stimuli ,  arousable by verbal stimuli (falls asleep multiple times during exam for seconds at a time)  Knowledge: good.     Cranial Nerves     CN III, IV, VI   Pupils are equal, round, and reactive to light.  Extraocular motions are normal.     CN V   Facial sensation intact.     CN VII   Facial expression full, symmetric.     CN VIII   Hearing: intact    CN XI   Right sternocleidomastoid strength: normal  Left sternocleidomastoid strength: normal  Right trapezius strength: normal  Left trapezius strength: normal    CN XII   Tongue: not atrophic  Tongue deviation: none    Motor Exam     Strength   Right deltoid: 5/5  Left deltoid: 5/5  Right biceps: 5/5  Left biceps: 5/5  Right triceps: 5/5  Left triceps: 5/5  Right wrist flexion: 5/5  Left wrist flexion: 5/5  Right wrist extension: 5/5  Left wrist " extension: 5/5  Right interossei: 5/5  Left interossei: 5/5  Right iliopsoas: 5/5  Left iliopsoas: 4/5  Right quadriceps: 5/5  Left quadriceps: 5/5  Right anterior tibial: 5/5  Left anterior tibial: 5/5  Right posterior tibial: 5/5  Left posterior tibial: 5/5  Right peroneal: 5/5  Left peroneal: 5/5  Right gastroc: 5/5  Left gastroc: 5/5Right EHL 5/5  Left EHL 5/5     Sensory Exam     Median nerve distribution right hand  Stocking distribution midshin to feet bilaterally    + Tinels at right elbow only, negative carpal tunnel bialterally     Gait, Coordination, and Reflexes     Reflexes   Right brachioradialis: 1+  Left brachioradialis: 1+  Right biceps: 1+  Left biceps: 1+  Right triceps: 1+  Left triceps: 1+  Right patellar: 1+  Left patellar: 1+  Right achilles: 0  Left achilles: 0  Right plantar: normal  Left plantar: normal  Right Barahona: absent  Left Bar[place]ona: absent  Right ankle clonus: absent  Left ankle clonus: absent  Significant difficulty going from seated to standing position from high chair using right arm; difficulty stepping up onto exam stool to transfer to table    Decreased range of motion bilateral ankles       Physical Exam  Eyes:      Extraocular Movements: EOM normal.      Pupils: Pupils are equal, round, and reactive to light.   Skin:     Comments: Right ankle severe deformity with wound on medial arch with bony prominence. Surgical scars on digits 2-5 for hammer toe corrections. Left ankle deformity as well with surgical scars present.     Bilateral lower leg scarring secondary to healed wounds. Wounds do not appear to be infected at this time, no open sores.    Neurological:      Mental Status: He is oriented to person, place, and time.      Deep Tendon Reflexes:      Reflex Scores:       Tricep reflexes are 1+ on the right side and 1+ on the left side.       Bicep reflexes are 1+ on the right side and 1+ on the left side.       Brachioradialis reflexes are 1+ on the right side and 1+ on  "the left side.       Patellar reflexes are 1+ on the right side and 1+ on the left side.       Achilles reflexes are 0 on the right side and 0 on the left side.  Psychiatric:         Speech: Speech normal.         Vital Signs  Pulse: (!) 51  BP: (!) 166/81  BP Location: Right arm  Patient Position: Sitting  Pain Score:   8  Height and Weight  Height: 5' 7" (170.2 cm)  Weight: 70.8 kg (156 lb 1.4 oz)  BSA (Calculated - sq m): 1.83 sq meters  BMI (Calculated): 24.4  Weight in (lb) to have BMI = 25: 159.3]    Provider dictation:    Patient has a very complex medical history with ongoing wound and vascular issues among others.   Will obtain updated imaging of the cervical and lumbar spine mostly for pain management and physical therapy planning as he is not a surgical candidate. He is open to conservative treatment and feels that he responded very well to therapy last time. Will call with results once completed.     With his fall and persistent headaches, I would like to obtain a CT head to rule out any intracranial processes and call with results today.     Visit Diagnosis:  Cervicalgia  -     MRI Cervical Spine Without Contrast; Future; Expected date: 02/12/2024    Lumbar radiculopathy, chronic  -     MRI Lumbar Spine Without Contrast; Future; Expected date: 02/12/2024  -     X-Ray Lumbar Spine Ap Lateral w/Flex Ext; Future; Expected date: 02/12/2024  -     Ambulatory referral/consult to Pain Clinic; Future; Expected date: 02/19/2024  -     Ambulatory referral/consult to Physical/Occupational Therapy; Future; Expected date: 02/19/2024    Fall, initial encounter  -     CT Head Without Contrast; Future; Expected date: 02/12/2024    Cervical radiculopathy  -     X-Ray Cervical Spine AP Lat with Flexion  Extension; Future; Expected date: 02/12/2024  -     Ambulatory referral/consult to Pain Clinic; Future; Expected date: 02/19/2024  -     Ambulatory referral/consult to Physical/Occupational Therapy; Future; Expected date: " 02/19/2024

## 2024-02-26 PROBLEM — M53.82 DECREASED RANGE OF MOTION OF INTERVERTEBRAL DISCS OF CERVICAL SPINE: Status: ACTIVE | Noted: 2024-02-26

## 2024-02-26 PROBLEM — R53.1 DECREASED STRENGTH, ENDURANCE, AND MOBILITY: Status: ACTIVE | Noted: 2024-02-26

## 2024-02-26 PROBLEM — R68.89 DECREASED STRENGTH, ENDURANCE, AND MOBILITY: Status: ACTIVE | Noted: 2024-02-26

## 2024-02-26 PROBLEM — Z74.09 DECREASED STRENGTH, ENDURANCE, AND MOBILITY: Status: ACTIVE | Noted: 2024-02-26

## 2024-02-26 PROBLEM — M53.86 DECREASED RANGE OF MOTION OF INTERVERTEBRAL DISCS OF LUMBAR SPINE: Status: ACTIVE | Noted: 2024-02-26

## 2024-03-01 ENCOUNTER — HOSPITAL ENCOUNTER (OUTPATIENT)
Dept: RADIOLOGY | Facility: HOSPITAL | Age: 79
Discharge: HOME | End: 2024-03-01
Attending: PHYSICIAN ASSISTANT
Payer: MEDICARE

## 2024-03-01 DIAGNOSIS — M54.16 LUMBAR RADICULOPATHY, CHRONIC: ICD-10-CM

## 2024-03-01 DIAGNOSIS — M54.2 CERVICALGIA: ICD-10-CM

## 2024-03-01 PROCEDURE — 72141 MRI NECK SPINE W/O DYE: CPT | Mod: 26,,, | Performed by: RADIOLOGY

## 2024-03-01 PROCEDURE — 72148 MRI LUMBAR SPINE W/O DYE: CPT | Mod: TC,PO

## 2024-03-01 PROCEDURE — 72148 MRI LUMBAR SPINE W/O DYE: CPT | Mod: 26,,, | Performed by: RADIOLOGY

## 2024-03-01 PROCEDURE — 72141 MRI NECK SPINE W/O DYE: CPT | Mod: TC,PO

## 2024-03-04 ENCOUNTER — PATIENT MESSAGE (OUTPATIENT)
Dept: NEUROSURGERY | Facility: CLINIC | Age: 79
End: 2024-03-04
Payer: MEDICARE

## 2024-03-18 ENCOUNTER — PATIENT MESSAGE (OUTPATIENT)
Dept: NEUROSURGERY | Facility: CLINIC | Age: 79
End: 2024-03-18
Payer: MEDICARE

## 2024-04-01 ENCOUNTER — OFFICE VISIT (OUTPATIENT)
Dept: PAIN MEDICINE | Facility: CLINIC | Age: 79
End: 2024-04-01
Payer: MEDICARE

## 2024-04-01 VITALS
WEIGHT: 156.88 LBS | BODY MASS INDEX: 24.62 KG/M2 | HEIGHT: 67 IN | HEART RATE: 75 BPM | SYSTOLIC BLOOD PRESSURE: 170 MMHG | DIASTOLIC BLOOD PRESSURE: 89 MMHG

## 2024-04-01 DIAGNOSIS — M79.604 RIGHT LEG PAIN: ICD-10-CM

## 2024-04-01 DIAGNOSIS — M25.512 CHRONIC LEFT SHOULDER PAIN: ICD-10-CM

## 2024-04-01 DIAGNOSIS — G89.4 CHRONIC PAIN SYNDROME: Primary | ICD-10-CM

## 2024-04-01 DIAGNOSIS — E11.610 DIABETIC CHARCOT FOOT: ICD-10-CM

## 2024-04-01 DIAGNOSIS — G89.29 CHRONIC LEFT SHOULDER PAIN: ICD-10-CM

## 2024-04-01 DIAGNOSIS — M54.16 LUMBAR RADICULOPATHY, CHRONIC: ICD-10-CM

## 2024-04-01 PROCEDURE — 3288F FALL RISK ASSESSMENT DOCD: CPT | Mod: CPTII,S$GLB,, | Performed by: ANESTHESIOLOGY

## 2024-04-01 PROCEDURE — 1159F MED LIST DOCD IN RCRD: CPT | Mod: CPTII,S$GLB,, | Performed by: ANESTHESIOLOGY

## 2024-04-01 PROCEDURE — 3079F DIAST BP 80-89 MM HG: CPT | Mod: CPTII,S$GLB,, | Performed by: ANESTHESIOLOGY

## 2024-04-01 PROCEDURE — 99204 OFFICE O/P NEW MOD 45 MIN: CPT | Mod: S$GLB,,, | Performed by: ANESTHESIOLOGY

## 2024-04-01 PROCEDURE — 1125F AMNT PAIN NOTED PAIN PRSNT: CPT | Mod: CPTII,S$GLB,, | Performed by: ANESTHESIOLOGY

## 2024-04-01 PROCEDURE — 3077F SYST BP >= 140 MM HG: CPT | Mod: CPTII,S$GLB,, | Performed by: ANESTHESIOLOGY

## 2024-04-01 PROCEDURE — 99999 PR PBB SHADOW E&M-EST. PATIENT-LVL V: CPT | Mod: PBBFAC,,, | Performed by: ANESTHESIOLOGY

## 2024-04-01 PROCEDURE — 1160F RVW MEDS BY RX/DR IN RCRD: CPT | Mod: CPTII,S$GLB,, | Performed by: ANESTHESIOLOGY

## 2024-04-01 PROCEDURE — 1100F PTFALLS ASSESS-DOCD GE2>/YR: CPT | Mod: CPTII,S$GLB,, | Performed by: ANESTHESIOLOGY

## 2024-04-01 RX ORDER — TRAMADOL HYDROCHLORIDE 50 MG/1
50 TABLET ORAL EVERY 12 HOURS PRN
Qty: 30 TABLET | Refills: 0 | Status: SHIPPED | OUTPATIENT
Start: 2024-04-01 | End: 2024-05-29 | Stop reason: SDUPTHER

## 2024-04-01 NOTE — PROGRESS NOTES
[facility] New Patient Evaluation      Referred by: Dana Pérez    PCP:     CC:   Chief Complaint   Patient presents with    Neck Pain          4/1/2024     4:00 PM   Last 3 PDI Scores   Pain Disability Index (PDI) 29         HPI:   Rojelio Dye Sr. is a 78 y.o. male patient who has a past medical history of Acid reflux, Acquired hammer toe of right foot, Arthritis, Arthritis of foot, Asthma, Benign essential hypertension, BPH (benign prostatic hyperplasia), BPH with urinary obstruction, CKD (chronic kidney disease), stage II, CKD (chronic kidney disease), stage III, COVID-19, Diabetic Charcot foot, Dyslipidemia, Encounter for blood transfusion, GERD (gastroesophageal reflux disease), Gout, Hammer toe, HAV (hallux abducto valgus), [place] (hard of hearing), Hypercholesterolemia without hypertriglyceridemia, Hypertension, Inflammatory osteoarthritis, Mild intermittent asthma without complication, Narcolepsy, ADRIANNA on CPAP, ADRIANNA on CPAP, Osteoarthrosis involving, or with mention of more than one site, but not specified as generalized, multiple sites, PAD (peripheral artery disease), Pancytopenia, Pancytopenia, Pericarditis, Pes planovalgus, Pre-diabetes, PVD (peripheral vascular disease), Recurrent cellulitis, RLS (restless legs syndrome), Schwannoma, Schwannoma, Type 2 diabetes mellitus, Venous insufficiency, and Venous stasis dermatitis of both lower extremities. He presents with left shoulder pain and back pain.  He has had these pains chronically for many years and they have been gradually worsening.  He is with his wife today.  Today he reports his biggest pain is lower back pain radiating primarily down his right leg.  His pain is constant, 10 in 10.  Pain is worse with standing and walking.  He also reports swelling in his right leg and right ankle.  He has Charcot's foot on the right.      Pain Intervention History:      Past Spine Surgical History:      Past and current  medications:  Antineuropathics: gabapentin   NSAIDs:  Physical therapy: yes, made pain worse  Antidepressants: cymbalta   Muscle relaxers:  Opioids:  Antiplatelets/Anticoagulants: aspirin     History:    Current Outpatient Medications:     acetaminophen (TYLENOL) 650 MG TbSR, Take 650 mg by mouth 2 (two) times a day., Disp: , Rfl:     albuterol (VENTOLIN HFA) 90 mcg/actuation inhaler, Inhale 2 puffs into the lungs every 6 (six) hours as needed for Wheezing. Rescue, Disp: 8 g, Rfl: 1    allopurinoL (ZYLOPRIM) 300 MG tablet, TAKE 1 TABLET BY MOUTH ONCE EVERY DAY, Disp: 90 tablet, Rfl: 0    aspirin (ECOTRIN) 81 MG EC tablet, Take 81 mg by mouth every evening., Disp: , Rfl:     azelastine (ASTELIN) 137 mcg (0.1 %) nasal spray, 1 puff in each nostril; 137 MCG/SPRAY; Twice a day; 30 day(s), Disp: , Rfl:     budesonide-glycopyr-formoterol (BREZTRI AEROSPHERE) 160-9-4.8 mcg/actuation HFAA, Inhale 2 Inhalations into the lungs 2 (two) times a day., Disp: 10.7 g, Rfl: 1    carvediloL (COREG) 12.5 MG tablet, Take 12.5 mg by mouth 2 (two) times daily., Disp: , Rfl:     cholecalciferol, vitamin D3, 125 mcg (5,000 unit) Tab, Take 5,000 Units by mouth every 14 (fourteen) days., Disp: , Rfl:     cloNIDine (CATAPRES) 0.1 MG tablet, Take 1 tablet (0.1 mg total) by mouth once daily. Take 1 prn sbp >180 or dbp >100, Disp: 30 tablet, Rfl: 5    cloNIDine (CATAPRES) 0.2 MG tablet, He takes the 0.1 mg (1/2 tablet)  in the morning and 0.1 mg (1/2 tablet) prn ( SBP >180 and DBP >100) and 0.2mg (1 full tablet) at night., Disp: 90 tablet, Rfl: 1    doxazosin (CARDURA) 8 MG Tab, Take 8 mg by mouth every evening., Disp: , Rfl:     DULoxetine (CYMBALTA) 30 MG capsule, TAKE 1 CAPSULE EVERY DAY, Disp: 90 capsule, Rfl: 3    famotidine (PEPCID) 20 MG tablet, TAKE 1 TABLET BY MOUTH TWICE DAILY FOR THE STOMACH, Disp: 60 tablet, Rfl: 11    fluticasone propionate (FLONASE) 50 mcg/actuation nasal spray, USE 2 SPRAYS IN EACH NOSTRIL ONCE EVERY DAY, Disp: 16  "g, Rfl: 11    furosemide (LASIX) 40 MG tablet, TAKE 1 TABLET BY MOUTH ONCE EVERY DAY FOR FLUID, Disp: 30 tablet, Rfl: 11    gabapentin (NEURONTIN) 300 MG capsule, TAKE 1 CAPSULE BY MOUTH 3 TIMES A DAY AS DIRECTED, Disp: 270 capsule, Rfl: 1    glucosam-chond-msm1-C-destiney-bor (GLUCOSAMINE-CHOND-MSM COMPLEX) 375-500-15-0.5 mg Tab, Take 1 tablet by mouth once daily., Disp: , Rfl:     glucosamine-chondroitin 500-400 mg tablet, Take 1 tablet by mouth Daily., Disp: , Rfl:     insulin degludec (TRESIBA FLEXTOUCH U-100) 100 unit/mL (3 mL) insulin pen, Inject 26 Units into the skin once daily., Disp: 3 mL, Rfl: 11    LEVEMIR FLEXTOUCH U-100 INSULN 100 unit/mL (3 mL) InPn pen, INJECT 26 UNITS INTO THE SKIN ONCE DAILY, Disp: 15 mL, Rfl: 5    LIDOcaine 4 % PtMd, Apply 1 patch topically as needed (pain)., Disp: , Rfl:     losartan (COZAAR) 25 MG tablet, Take 1 tablet by mouth once daily., Disp: , Rfl:     menthol/camphor/irr cntr-irtn1 (NICOLA NATURAL PAIN RELIEVING TOP), Apply 1 application topically 3 (three) times daily. , Disp: , Rfl:     multivit-min-FA-lycopen-lutein (CENTRUM SILVER MEN) 300-600-300 mcg Tab, Take 1 tablet by mouth once daily., Disp: , Rfl:     multivitamin with minerals tablet, Take 1 tablet by mouth once daily. , Disp: , Rfl:     mupirocin (BACTROBAN) 2 % ointment, Apply topically 3 (three) times daily. (Patient taking differently: Apply topically once daily.), Disp: 30 g, Rfl: 5    NOVOLOG FLEXPEN U-100 INSULIN 100 unit/mL (3 mL) InPn pen, PER SLIDING SCALE: 0 UNITS IF BLOOD SUGAR LESS THAN 150, 2U -200, 4U -250, 6U -300, 8U -350, 10U -400, 12U IF ABOVE 400, Disp: 15 mL, Rfl: 2    oxybutynin (DITROPAN) 5 MG Tab, TAKE 1 TABLET BY MOUTH TWICE DAILY FOR BLADDER CONTROL, Disp: 60 tablet, Rfl: 1    pen needle, diabetic 32 gauge x 5/32" Ndle, USE WITH INSULIN PENS AS DIRECTED BY PHYSICIAN, Disp: 100 each, Rfl: 2    rOPINIRole (REQUIP) 3 MG tablet, TAKE 1 TABLET BY MOUTH EVERY " EVENING, Disp: 30 tablet, Rfl: 5    sildenafiL (VIAGRA) 100 MG tablet, Take 1 tablet by mouth daily as needed., Disp: , Rfl:     simvastatin (ZOCOR) 40 MG tablet, TAKE 1 TABLET BY MOUTH AT BEDTIME FOR CHOLESTEROL (Patient taking differently: Take 40 mg by mouth every evening.), Disp: 90 tablet, Rfl: 1    tadalafiL (CIALIS) 20 MG Tab, 1/2 - 1 prn sexual activity, Disp: 10 tablet, Rfl: 11    triamcinolone acetonide 0.1% (KENALOG) 0.1 % cream, Apply 454 g topically 2 (two) times daily., Disp: , Rfl:     TRUE METRIX GLUCOSE TEST STRIP Strp, USE TO CHECK BLOOD GLUCOSE 4 TIMES A DAY, Disp: 100 strip, Rfl: 5    TRUEPLUS LANCETS 30 gauge Misc, USE TO TEST BLOOD SUGAR 4 TIMES A DAY, Disp: 100 each, Rfl: 5    blood-glucose meter kit, To check BG 4 times daily, to use with insurance preferred meter, Disp: 1 each, Rfl: 1    traMADoL (ULTRAM) 50 mg tablet, Take 1 tablet (50 mg total) by mouth every 12 (twelve) hours as needed for Pain., Disp: 30 tablet, Rfl: 0    Current Facility-Administered Medications:     EPINEPHrine (EPIPEN) 0.3 mg/0.3 mL pen injection 0.3 mg, 0.3 mg, Intramuscular, PRN, KEENAN Norris, FNP-C    Past Medical History:   Diagnosis Date    Acid reflux     Acquired hammer toe of right foot 08/29/2014    Arthritis     Arthritis of foot 12/09/2015    Asthma     Benign essential hypertension 04/23/2014    BPH (benign prostatic hyperplasia)     BPH with urinary obstruction 12/10/2015    CKD (chronic kidney disease), stage II 04/23/2014    Followed by Dr. Rodriguez Brandt    CKD (chronic kidney disease), stage III     COVID-19     8/2021, 1/2022    Diabetic Charcot foot     Dyslipidemia     Encounter for blood transfusion     GERD (gastroesophageal reflux disease)     Gout     Hammer toe     HAV (hallux abducto valgus)     [place] (hard of hearing)     Hypercholesterolemia without hypertriglyceridemia 07/17/2004    Hypertension     Inflammatory osteoarthritis     Mild intermittent asthma without complication 04/23/2014     Narcolepsy     ADRIANNA on CPAP 04/23/2014    ADRIANNA on CPAP     Osteoarthrosis involving, or with mention of more than one site, but not specified as generalized, multiple sites 12/28/2011    PAD (peripheral artery disease)     Pancytopenia 02/15/2022    Pancytopenia     Pericarditis     Pes planovalgus 05/05/2014    Pre-diabetes 04/23/2014    PVD (peripheral vascular disease)     Recurrent cellulitis     RLS (restless legs syndrome)     Schwannoma     T vert body    Schwannoma     Type 2 diabetes mellitus     Venous insufficiency     Venous stasis dermatitis of both lower extremities        Past Surgical History:   Procedure Laterality Date    ADENOIDECTOMY      AORTOGRAPHY WITH SERIALOGRAPHY Bilateral 10/24/2022    Procedure: AORTOGRAM, WITH SERIALOGRAPHY;  Surgeon: Landon Dupree MD;  Location: [facility] CATH;  Service: Cardiology;  Laterality: Bilateral;    AORTOGRAPHY WITH SERIALOGRAPHY      CATARACT EXTRACTION      cataracts surgery      COCHLEAR IMPLANT REVISION      DECOMPRESSION OF CERVICAL SPINE BY ANTERIOR APPROACH WITH FUSION N/A 12/22/2018    Procedure: DECOMPRESSION AND FUSION, SPINE, CERVICAL, ANTERIOR APPROACH, C3-C5;  Surgeon: Gian Mao MD;  Location: [facility] OR;  Service: Neurosurgery;  Laterality: N/A;    DECOMPRESSION OF CERVICAL SPINE BY ANTERIOR APPROACH WITH FUSION      FOOT SURGERY  04/22/2014    left    FOOT SURGERY      FUSION OF CERVICAL SPINE BY POSTERIOR APPROACH N/A 12/22/2018    Procedure: POSTERIOR CERVICAL FUSION WITH Segmented intrumentation LAMINECTOMY, C3-C5;  Surgeon: Gian Mao MD;  Location: [facility] OR;  Service: Neurosurgery;  Laterality: N/A;    FUSION OF CERVICAL SPINE BY POSTERIOR APPROACH      HERNIA REPAIR      PERCUTANEOUS TRANSLUMINAL ANGIOPLASTY N/A 10/24/2022    Procedure: PTA (ANGIOPLASTY, PERCUTANEOUS, TRANSLUMINAL);  Surgeon: Landon Dupree MD;  Location: [facility] CATH;  Service: Cardiology;  Laterality: N/A;    PERCUTANEOUS TRANSLUMINAL ANGIOPLASTY  "(PTA) OF PERIPHERAL VESSEL      PINNING OF HIP Left 11/26/2022    Procedure: PINNING, HIP;  Surgeon: Ana Espinal MD;  Location: [facility];  Service: Orthopedics;  Laterality: Left;    PROSTATE SURGERY      TONSILLECTOMY      VASECTOMY         Family History   Problem Relation Age of Onset    Heart failure Mother     Osteoporosis Mother     Heart disease Father     Rheum arthritis Neg Hx     Lupus Neg Hx        Social History     Socioeconomic History    Marital status:    Tobacco Use    Smoking status: Never    Smokeless tobacco: Never    Tobacco comments:     2nd Hand -Father smoked 3 packs a day   Substance and Sexual Activity    Alcohol use: Yes     Comment: rarely    Drug use: Never    Sexual activity: Not Currently     Partners: Female     Birth control/protection: None   Social History Narrative    ** Merged History Encounter **            Review of patient's allergies indicates:   Allergen Reactions    Sulfa (sulfonamide antibiotics) Hives    Sulfamethoxazole-trimethoprim Rash    Mobic [meloxicam]     Sulfa (sulfonamide antibiotics)     Mobic [meloxicam] Hives       Review of Systems:  12 point review of systems is negative.    Physical Exam:  Vitals:    04/01/24 1557   BP: (!) 170/89   Pulse: 75   Weight: 71.2 kg (156 lb 13.7 oz)   Height: 5' 7" (1.702 m)   PainSc: 10-Worst pain ever   PainLoc: Neck     Body mass index is 24.57 kg/m².    Gen: NAD  Psych: mood appropriate for given condition  HEENT: eyes anicteric   CV: RRR  HEENT: anicteric   Respiratory: non-labored, no signs of respiratory distress  Abd: non-distended  Skin: warm, dry and intact.  Gait: antalgic gait.     Discoloration of the right foot.  Evidence of Charcot foot on the right.  Swelling of the dorsum of the right foot and right ankle.  Bandage over a wound on the right shin        Sensory:  Intact and symmetrical to light touch in C4-T1 dermatomes bilaterally. Intact and symmetrical to light touch in L1-S1 dermatomes " bilaterally.    Motor:    Right Left   C4 Shoulder Abduction  5  4 pain limited   C5 Elbow Flexion    5  5   C6 Wrist Extension  5  5   C7 Elbow Extension   5  5   C8/T1 Hand Intrinsics   5  5        Right Left   L2/3 Iliacus Hip flexion  5  5   L3/4 Qudratus Femoris Knee Extension  5  5   L4/5 Tib Anterior Ankle Dorsiflexion   5  5   L5/S1 Extensor Hallicus Longus Great toe extension  5  5   S1/S2 Gastroc/Soleus Plantar Flexion  5  5       Labs:  Lab Results   Component Value Date    HGBA1C 6.6 (H) 10/31/2023       Lab Results   Component Value Date    WBC 3.98 10/31/2023    HGB 13.4 (L) 10/31/2023    HCT 41.5 10/31/2023    MCV 97 10/31/2023     (L) 10/31/2023       Imaging:  MRI lumbar spine 3/1/24  FINDINGS:  Vertebral column: There is extensive multilevel degenerative change.  As seen on the prior studies, there is a broad rotary levocurvature of the lumbar spine.  There is severe disc space narrowing diffusely at the T11-12, T12-L1, L1-2 levels and worse towards the right at the L2-3, L3-4, L4-5 levels.  There is Modic type 1 degenerative endplate signal change towards the right at L4-5.  There is no acute fracture.  Recent plain films raise the question of superior endplate compression of L2.  This is likely related to curvature of the spine.  All of the discs are desiccated.     Spinal canal, conus, epidural space: The spinal canal appears somewhat small on a developmental basis.  There is no abnormal epidural mass or fluid collection.  The conus terminates at the level of L1 and is grossly normal in contour and signal intensity.     Findings by level:     On the sagittal images, there is no spinal stenosis or cord compression at the T10-11 level.  At T11-12 there is a mild disc bulge resulting in mild spinal stenosis without acute cord compression.  T12-L1: There is disc space narrowing, mild disc bulge with osteophytic ridging and left greater than right facet joint arthropathy.  There is no  significant spinal stenosis.  There is at least mild bilateral foraminal stenosis.  L1-2: There is marked disc space narrowing, trace retrolisthesis of L1 on L2.  There is a disc bulge with osteophytic ridging and right greater than left facet joint arthropathy.  There is no significant spinal stenosis but there is moderate-to-marked bilateral foraminal stenosis.  L2-3: There is marked disc space narrowing, worse towards the right.  There is mild retrolisthesis of L2 on L3 with osteophytic ridging.  There is right greater than left facet joint arthropathy.  There is no spinal stenosis but there is crowding of the right lateral recess.  There is moderate to severe right and moderate left foraminal stenosis.  L3-4: There is severe disc space narrowing, worse towards the right where there is bony fusion.  There is trace retrolisthesis of L3 on L4.  There is osteophytic ridging with facet joint arthropathy.  There is mild spinal stenosis.  There is crowding of the lateral recess bilaterally and moderate to severe bilateral foraminal stenosis.  L4-5: There is trace anterolisthesis of L4 on L5.  Marked disc space narrowing is worse towards the right where there is extensive Modic type 1 degenerative endplate signal change.  There is a broad right paracentral and foraminal disc protrusion in addition to right greater than left facet joint arthropathy resulting in severe right, moderate to severe left foraminal stenosis and spinal stenosis.  There is also right periarticular edema.  L5-S1: There is trace anterolisthesis of L5 on S1.  There is marked disc space narrowing.  There is left greater than right facet joint arthropathy.  There is a diffuse disc bulge.  There is moderate to severe left and mild right foraminal stenosis without significant spinal stenosis.     Soft tissues, other: The posterior spinous muscles are atrophic.  The prevertebral soft tissues are normal.  There is no aneurysm of the aorta.    MRI cervical  spine 3/1/24  FINDINGS:  Vertebral column: The study is mildly motion degraded.  There has been previous surgery with a ACDF from C3 through C5.  Anterior fusion plate and vertebral body fixation screws results in only mild susceptibility artifact.  There is severe disc space narrowing at the C5-6 and C6-7 levels.  C7 and T1 are fused.  This may be related to severe disc space narrowing.  There is 3 mm retrolisthesis of C6 on C7 and 3 mm anterolisthesis of C7 on T1.  The odontoid process is intact.     Spinal canal, cord, epidural space: The spinal canal is developmentally normal.  Cord signal is somewhat suboptimally evaluated due to patient motion but there is no gross edema or myelomalacia.     Findings by level:     C2-3: There is a mildly exaggerated lordosis at C2-3 where there is marked disc space narrowing towards the right.  There is flattening of the dorsal cord surface related to this curvature but cord signal is normal.  There is no spinal stenosis.  There is only mild left foraminal stenosis due to uncovertebral spurring and facet joint arthropathy without significant change.  C3-4: There is been previous fusion.  There is bilateral facet joint arthropathy with mild uncovertebral spurring.  There is no spinal stenosis.  There is at least mild bilateral foraminal stenosis.  C4-5: There are changes of previous fusion.  There is bilateral facet joint arthropathy and uncovertebral spurring.  Again there is no significant spinal stenosis.  There is moderate bilateral foraminal stenosis.  C5-6: There is marked disc space narrowing.  There is bilateral uncovertebral spurring and mild facet joint arthropathy with osteophytic ridging resulting in severe bilateral foraminal stenosis without significant spinal stenosis.  C6-7: There is severe disc space narrowing, mild retrolisthesis of C6 on C7.  There is bilateral uncovertebral spurring contributing to severe bilateral foraminal stenosis.  There is also left  greater than right facet joint arthropathy.  There is borderline to mild spinal stenosis.  C7-T1: The disc space is obscured by the bony fusion or severe disc space narrowing.  There is anterolisthesis of C7 on T1.  There is facet joint arthropathy with ligamentum flavum thickening.  There is only mild spinal stenosis without cord compression.  There is moderate bilateral foraminal stenosis.     Soft tissues, other: The prevertebral soft tissues are grossly normal.    Xray cervical spine 2/12/24  FINDINGS:  Redemonstrated anterior and posterior fusion changes spanning C3-C5.  The fusion appears solid.  Hardware demonstrates stable and satisfactory alignment.  Osseous alignment is stable without evidence of any spondylolisthesis or abnormal motion.  Vertebral body heights are preserved.  No abnormal prevertebral soft tissue thickening.    Assessment:   Problem List Items Addressed This Visit          Orthopedic    Diabetic Charcot foot     Other Visit Diagnoses       Chronic pain syndrome    -  Primary    Relevant Medications    traMADoL (ULTRAM) 50 mg tablet    Lumbar radiculopathy, chronic        Relevant Medications    traMADoL (ULTRAM) 50 mg tablet    Chronic left shoulder pain        Right leg pain        Relevant Orders    US Lower Extremity Veins Right    Ambulatory referral/consult to Wound Clinic              Rojelio Dye Sr. is a 78 y.o. male patient who has a past medical history of Acid reflux, Acquired hammer toe of right foot, Arthritis, Arthritis of foot, Asthma, Benign essential hypertension, BPH (benign prostatic hyperplasia), BPH with urinary obstruction, CKD (chronic kidney disease), stage II, CKD (chronic kidney disease), stage III, COVID-19, Diabetic Charcot foot, Dyslipidemia, Encounter for blood transfusion, GERD (gastroesophageal reflux disease), Gout, Hammer toe, HAV (hallux abducto valgus), [place] (hard of hearing), Hypercholesterolemia without hypertriglyceridemia, Hypertension,  Inflammatory osteoarthritis, Mild intermittent asthma without complication, Narcolepsy, ADRIANNA on CPAP, ADRIANNA on CPAP, Osteoarthrosis involving, or with mention of more than one site, but not specified as generalized, multiple sites, PAD (peripheral artery disease), Pancytopenia, Pancytopenia, Pericarditis, Pes planovalgus, Pre-diabetes, PVD (peripheral vascular disease), Recurrent cellulitis, RLS (restless legs syndrome), Schwannoma, Schwannoma, Type 2 diabetes mellitus, Venous insufficiency, and Venous stasis dermatitis of both lower extremities. He presents with left shoulder pain and back pain.  He has had these pains chronically for many years and they have been gradually worsening.  He is with his wife today.  Today he reports his biggest pain is lower back pain radiating primarily down his right leg.  His pain is constant, 10 in 10.  Pain is worse with standing and walking.  He also reports swelling in his right leg and right ankle.  He has Charcot's foot on the right.    - on exam he appears to have full strength in his lower extremities.  He has a wound that is bandaged.  He has not seen wound care for this but in the past has seen wound care for similar cellulitis related changes in his legs.  In the past he is required PICC line for antibiotic treatment.  He has range of motion limitations in his right ankle.  He has swelling over the right foot and right ankle compared to the left  - I independently reviewed his lumbar and cervical MRIs.  He does not have any significant central canal narrowing in the cervical spine.  Multilevel bilateral foraminal narrowing in the cervical spine.  His lumbar spine he has severe central canal narrowing at L4-5.  Severe degenerative disease with bony fusion of L2 to L4  - over the past few weeks he has a temp of formal physical therapy however he feels like it made his pain worse.  He follows with Orthopedics for his knee pain and shoulder pain.  I have recommended follow up  with Orthopedics regarding his left shoulder pain  - I have placed a referral for him to be evaluated by wound care for his wound on his right lower extremity  - I have ordered an ultrasound of his right lower extremity to rule out DVT  - he is taking gabapentin for the neuropathic component of his pain and Tylenol 650 mg twice a day however continues to have significant lumbar radicular pain.  He has a poor candidate for lumbar MARBIN at this time given history of thrombocytopenia, history of cellulitis with current wound in the right lower extremity.  - I prescribed her some tramadol to use 1 to 2 times a day as needed for severe pain.  He understands not to mix this with any other medications.  He has not taking any benzodiazepines at this time.  Regarding opioids, the risks were discussed including tolerance, addiction, overdose, over-sedation, drug interactions, respiratory depression, opioid-induced hyperalgesia, and even death.    - we will have him follow up in 1 month      : Reviewed and consistent with medication use as prescribed.    Kris Torres M.D.  Interventional Pain Medicine / Anesthesiology    This note was completed with dictation software and grammatical errors may exist.

## 2024-04-03 ENCOUNTER — HOSPITAL ENCOUNTER (OUTPATIENT)
Dept: RADIOLOGY | Facility: HOSPITAL | Age: 79
Discharge: HOME | End: 2024-04-03
Attending: ANESTHESIOLOGY
Payer: MEDICARE

## 2024-04-03 DIAGNOSIS — M79.604 RIGHT LEG PAIN: ICD-10-CM

## 2024-04-03 PROCEDURE — 93971 EXTREMITY STUDY: CPT | Mod: TC,PO,RT

## 2024-04-03 PROCEDURE — 93971 EXTREMITY STUDY: CPT | Mod: 26,RT,, | Performed by: RADIOLOGY

## 2024-04-09 ENCOUNTER — OFFICE VISIT (OUTPATIENT)
Dept: NEUROSURGERY | Facility: CLINIC | Age: 79
End: 2024-04-09
Payer: MEDICARE

## 2024-04-09 VITALS
SYSTOLIC BLOOD PRESSURE: 164 MMHG | HEIGHT: 67 IN | WEIGHT: 156.94 LBS | HEART RATE: 67 BPM | RESPIRATION RATE: 18 BRPM | DIASTOLIC BLOOD PRESSURE: 91 MMHG | BODY MASS INDEX: 24.63 KG/M2

## 2024-04-09 DIAGNOSIS — M54.16 LUMBAR RADICULOPATHY, CHRONIC: Primary | ICD-10-CM

## 2024-04-09 DIAGNOSIS — M54.2 CERVICALGIA: ICD-10-CM

## 2024-04-09 PROCEDURE — 1101F PT FALLS ASSESS-DOCD LE1/YR: CPT | Mod: CPTII,S$GLB,, | Performed by: NEUROLOGICAL SURGERY

## 2024-04-09 PROCEDURE — 99214 OFFICE O/P EST MOD 30 MIN: CPT | Mod: S$GLB,,, | Performed by: NEUROLOGICAL SURGERY

## 2024-04-09 PROCEDURE — 1125F AMNT PAIN NOTED PAIN PRSNT: CPT | Mod: CPTII,S$GLB,, | Performed by: NEUROLOGICAL SURGERY

## 2024-04-09 PROCEDURE — 3080F DIAST BP >= 90 MM HG: CPT | Mod: CPTII,S$GLB,, | Performed by: NEUROLOGICAL SURGERY

## 2024-04-09 PROCEDURE — 3288F FALL RISK ASSESSMENT DOCD: CPT | Mod: CPTII,S$GLB,, | Performed by: NEUROLOGICAL SURGERY

## 2024-04-09 PROCEDURE — 3077F SYST BP >= 140 MM HG: CPT | Mod: CPTII,S$GLB,, | Performed by: NEUROLOGICAL SURGERY

## 2024-04-09 PROCEDURE — 1159F MED LIST DOCD IN RCRD: CPT | Mod: CPTII,S$GLB,, | Performed by: NEUROLOGICAL SURGERY

## 2024-04-09 NOTE — PROGRESS NOTES
Neurosurgery History and Physical    Patient ID: Rojelio Dye Sr. is a 78 y.o. male.    Chief Complaint   Patient presents with    Follow-up     HPI 4/9/24:  Patient returns to clinic for review of his imaging. He went to PT for about 3 weeks and feels like it wasn't helpful as they weren't doing anything that he cannot do on his own at home. The OT suggested he change his driving position, and this relieved his right hand numbness in the first three fingers. He has no more numbness when driving. He has neck pain and left shoulder is needing a reverse total shoulder arthroplasty. If he uses his lidocaine and other medications, he can tolerate the pain. He has pain that radiates in to the left arm, but also feels like this is related to the left shoulder. He still feels like he is stronger since surgery and hasn't noticed any new weakness. His balance is ok, walks with cane. He is working on increasing this walking tolerance.     His low back is no longer bothering him, but he has right buttocks pain that radiates down to the heel. He went to pain management who suggested an MARBIN if he is able to obtain clearances.     HPI 2/12/24:  Patient is a 78 year old with a complex medical history including severe RA, Charcot foot bilaterally, DM2, CKD, sp C3-5 ACDF 2018 with Dr. Mao who presents to [facility] with 1.5 weeks of right hand numbness and pain and right leg pain radiating into his right heel. There was no incident or fall at that time that could have caused his symptoms. His wife reports multiple falls, however, with the most recent being January 21 where he fell from a seated position and landed face/head first. He sustained significant bruising and continues to have headaches that he has never had before. He is also reporting orbital pain that is new, per his wife. They did not seek treatment for this fall. He has vision issues, being monitored for macular degeneration with limited vision in the  right eye and cataracts. Unsure if vision has worsened in the last 3 weeks. He has these falls because he is falling asleep. After an illness a few weeks ago he has been unable to use his CPAP consistently and he is being evaluated for BiPAP, but this is what they attribute his daytime somnolence to. He reports pain shooting from the right neck down the right arm into the first 3 fingers of his right hand. He has no left sided symptoms. This happens randomly throughout the day and while driving. He usually shakes it out and it improves. He cannot lay down due to neck range of motion or to find a comfortable position. He is also reporting right buttocks pain that radiates down into the right posterior heel. He has severe diabetic neuropathy with Charcot foot, status post 3 surgeries with poor outcomes. He typically walks with a cane, walker, or his electric scooter, but their house isn't ADA compliant which makes it difficult. He doesn't have any assistive devices today with him. His wife states because he just falls asleep, the assistive devices don't really prevent his falls. He has a significant vascular history with multiple stents, angioplasty, and issues with edema and severe wounds on his legs. He was seeing wound care, but his wife mostly takes care of his blistering edema. They are typically in and out of the hospital with cellulitis of the lower extremities, 6 times last year. He has significant trouble walking from his foot and ankle deformity, but doesn't feel dizzy. He feels weak in general, takes great effort to go from a seated to standing position having to rely on the right UE to lift him and his wife to support him from his belt loop. He has a known left rotator cuff tear and surgical intervention is not an option.     They mostly presented today as the last time he had similar symptoms, he ended up having emergency cervical spine fusion as he had significant cord compression and myelopathy that was  missed by an initial provider. They do not believe he is a surgical candidate unless it is emergently required.     Review of Systems   Musculoskeletal:  Positive for arthralgias, back pain, gait problem, joint swelling, neck pain and neck stiffness.   Skin:  Positive for color change and wound.   Neurological:  Positive for weakness. Negative for syncope, speech difficulty and numbness.   Psychiatric/Behavioral:  Negative for confusion.    All other systems reviewed and are negative.      Past Medical History:   Diagnosis Date    Acid reflux     Acquired hammer toe of right foot 08/29/2014    Arthritis     Arthritis of foot 12/09/2015    Asthma     Benign essential hypertension 04/23/2014    BPH (benign prostatic hyperplasia)     BPH with urinary obstruction 12/10/2015    CKD (chronic kidney disease), stage II 04/23/2014    Followed by Dr. Rodriguez Brandt    CKD (chronic kidney disease), stage III     COVID-19     8/2021, 1/2022    Diabetic Charcot foot     Dyslipidemia     Encounter for blood transfusion     GERD (gastroesophageal reflux disease)     Gout     Hammer toe     HAV (hallux abducto valgus)     [place] (hard of hearing)     Hypercholesterolemia without hypertriglyceridemia 07/17/2004    Hypertension     Inflammatory osteoarthritis     Mild intermittent asthma without complication 04/23/2014    Narcolepsy     ADRIANNA on CPAP 04/23/2014    ADRIANNA on CPAP     Osteoarthrosis involving, or with mention of more than one site, but not specified as generalized, multiple sites 12/28/2011    PAD (peripheral artery disease)     Pancytopenia 02/15/2022    Pancytopenia     Pericarditis     Pes planovalgus 05/05/2014    Pre-diabetes 04/23/2014    PVD (peripheral vascular disease)     Recurrent cellulitis     RLS (restless legs syndrome)     Schwannoma     T vert body    Schwannoma     Type 2 diabetes mellitus     Venous insufficiency     Venous stasis dermatitis of both lower extremities      Social History     Socioeconomic  History    Marital status:    Tobacco Use    Smoking status: Never    Smokeless tobacco: Never    Tobacco comments:     2nd Hand -Father smoked 3 packs a day   Substance and Sexual Activity    Alcohol use: Yes     Comment: rarely    Drug use: Never    Sexual activity: Not Currently     Partners: Female     Birth control/protection: None   Social History Narrative    ** Merged History Encounter **          Family History   Problem Relation Age of Onset    Heart failure Mother     Osteoporosis Mother     Heart disease Father     Rheum arthritis Neg Hx     Lupus Neg Hx      Review of patient's allergies indicates:   Allergen Reactions    Sulfa (sulfonamide antibiotics) Hives    Sulfamethoxazole-trimethoprim Rash    Mobic [meloxicam]     Sulfa (sulfonamide antibiotics)     Mobic [meloxicam] Hives       Current Outpatient Medications:     acetaminophen (TYLENOL) 650 MG TbSR, Take 650 mg by mouth 2 (two) times a day., Disp: , Rfl:     albuterol (VENTOLIN HFA) 90 mcg/actuation inhaler, Inhale 2 puffs into the lungs every 6 (six) hours as needed for Wheezing. Rescue, Disp: 8 g, Rfl: 1    allopurinoL (ZYLOPRIM) 300 MG tablet, TAKE 1 TABLET BY MOUTH ONCE EVERY DAY, Disp: 90 tablet, Rfl: 0    aspirin (ECOTRIN) 81 MG EC tablet, Take 81 mg by mouth every evening., Disp: , Rfl:     azelastine (ASTELIN) 137 mcg (0.1 %) nasal spray, 1 puff in each nostril; 137 MCG/SPRAY; Twice a day; 30 day(s), Disp: , Rfl:     budesonide-glycopyr-formoterol (BREZTRI AEROSPHERE) 160-9-4.8 mcg/actuation HFAA, Inhale 2 Inhalations into the lungs 2 (two) times a day., Disp: 10.7 g, Rfl: 1    carvediloL (COREG) 12.5 MG tablet, Take 12.5 mg by mouth 2 (two) times daily., Disp: , Rfl:     cholecalciferol, vitamin D3, 125 mcg (5,000 unit) Tab, Take 5,000 Units by mouth every 14 (fourteen) days., Disp: , Rfl:     cloNIDine (CATAPRES) 0.1 MG tablet, Take 1 tablet (0.1 mg total) by mouth once daily. Take 1 prn sbp >180 or dbp >100, Disp: 30 tablet,  Rfl: 5    cloNIDine (CATAPRES) 0.2 MG tablet, He takes the 0.1 mg (1/2 tablet)  in the morning and 0.1 mg (1/2 tablet) prn ( SBP >180 and DBP >100) and 0.2mg (1 full tablet) at night., Disp: 90 tablet, Rfl: 1    doxazosin (CARDURA) 8 MG Tab, Take 8 mg by mouth every evening., Disp: , Rfl:     DULoxetine (CYMBALTA) 30 MG capsule, TAKE 1 CAPSULE EVERY DAY, Disp: 90 capsule, Rfl: 3    famotidine (PEPCID) 20 MG tablet, TAKE 1 TABLET BY MOUTH TWICE DAILY FOR THE STOMACH, Disp: 60 tablet, Rfl: 11    fluticasone propionate (FLONASE) 50 mcg/actuation nasal spray, USE 2 SPRAYS IN EACH NOSTRIL ONCE EVERY DAY, Disp: 16 g, Rfl: 11    furosemide (LASIX) 40 MG tablet, TAKE 1 TABLET BY MOUTH ONCE EVERY DAY FOR FLUID, Disp: 30 tablet, Rfl: 11    gabapentin (NEURONTIN) 300 MG capsule, TAKE 1 CAPSULE BY MOUTH 3 TIMES A DAY AS DIRECTED, Disp: 270 capsule, Rfl: 1    glucosam-chond-msm1-C-destiney-bor (GLUCOSAMINE-CHOND-MSM COMPLEX) 375-500-15-0.5 mg Tab, Take 1 tablet by mouth once daily., Disp: , Rfl:     glucosamine-chondroitin 500-400 mg tablet, Take 1 tablet by mouth Daily., Disp: , Rfl:     insulin degludec (TRESIBA FLEXTOUCH U-100) 100 unit/mL (3 mL) insulin pen, Inject 26 Units into the skin once daily., Disp: 3 mL, Rfl: 11    LEVEMIR FLEXTOUCH U-100 INSULN 100 unit/mL (3 mL) InPn pen, INJECT 26 UNITS INTO THE SKIN ONCE DAILY, Disp: 15 mL, Rfl: 5    LIDOcaine 4 % PtMd, Apply 1 patch topically as needed (pain)., Disp: , Rfl:     losartan (COZAAR) 25 MG tablet, Take 1 tablet by mouth once daily., Disp: , Rfl:     menthol/camphor/irr cntr-irtn1 (NICOLA NATURAL PAIN RELIEVING TOP), Apply 1 application topically 3 (three) times daily. , Disp: , Rfl:     multivit-min-FA-lycopen-lutein (CENTRUM SILVER MEN) 300-600-300 mcg Tab, Take 1 tablet by mouth once daily., Disp: , Rfl:     multivitamin with minerals tablet, Take 1 tablet by mouth once daily. , Disp: , Rfl:     mupirocin (BACTROBAN) 2 % ointment, Apply topically 3 (three) times daily.  "(Patient taking differently: Apply topically once daily.), Disp: 30 g, Rfl: 5    NOVOLOG FLEXPEN U-100 INSULIN 100 unit/mL (3 mL) InPn pen, PER SLIDING SCALE: 0 UNITS IF BLOOD SUGAR LESS THAN 150, 2U -200, 4U -250, 6U -300, 8U -350, 10U -400, 12U IF ABOVE 400, Disp: 15 mL, Rfl: 2    oxybutynin (DITROPAN) 5 MG Tab, TAKE 1 TABLET BY MOUTH TWICE DAILY FOR BLADDER CONTROL, Disp: 60 tablet, Rfl: 1    pen needle, diabetic 32 gauge x 5/32" Ndle, USE WITH INSULIN PENS AS DIRECTED BY PHYSICIAN, Disp: 100 each, Rfl: 2    rOPINIRole (REQUIP) 3 MG tablet, TAKE 1 TABLET BY MOUTH EVERY EVENING, Disp: 30 tablet, Rfl: 5    sildenafiL (VIAGRA) 100 MG tablet, Take 1 tablet by mouth daily as needed., Disp: , Rfl:     simvastatin (ZOCOR) 40 MG tablet, TAKE 1 TABLET BY MOUTH AT BEDTIME FOR CHOLESTEROL (Patient taking differently: Take 40 mg by mouth every evening.), Disp: 90 tablet, Rfl: 1    tadalafiL (CIALIS) 20 MG Tab, 1/2 - 1 prn sexual activity, Disp: 10 tablet, Rfl: 11    traMADoL (ULTRAM) 50 mg tablet, Take 1 tablet (50 mg total) by mouth every 12 (twelve) hours as needed for Pain., Disp: 30 tablet, Rfl: 0    triamcinolone acetonide 0.1% (KENALOG) 0.1 % cream, Apply 454 g topically 2 (two) times daily., Disp: , Rfl:     TRUE METRIX GLUCOSE TEST STRIP Strp, USE TO CHECK BLOOD GLUCOSE 4 TIMES A DAY, Disp: 100 strip, Rfl: 5    TRUEPLUS LANCETS 30 gauge Misc, USE TO TEST BLOOD SUGAR 4 TIMES A DAY, Disp: 100 each, Rfl: 5    blood-glucose meter kit, To check BG 4 times daily, to use with insurance preferred meter, Disp: 1 each, Rfl: 1    Current Facility-Administered Medications:     EPINEPHrine (EPIPEN) 0.3 mg/0.3 mL pen injection 0.3 mg, 0.3 mg, Intramuscular, PRN, Norris, J. Ally, FNP-C  Blood pressure (!) 164/91, pulse 67, resp. rate 18, height 5' 7" (1.702 m), weight 71.2 kg (156 lb 15.5 oz).      Neurologic Exam     Mental Status   Oriented to person, place, and time.   Speech: speech is normal "   Level of consciousness: drowsy, arousable by tactile stimuli ,  arousable by verbal stimuli (falls asleep multiple times during exam for seconds at a time)  Knowledge: good.     Cranial Nerves     CN III, IV, VI   Pupils are equal, round, and reactive to light.  Extraocular motions are normal.     CN V   Facial sensation intact.     CN VII   Facial expression full, symmetric.     CN VIII   Hearing: intact    CN XI   Right sternocleidomastoid strength: normal  Left sternocleidomastoid strength: normal  Right trapezius strength: normal  Left trapezius strength: normal    CN XII   Tongue: not atrophic  Tongue deviation: none    Motor Exam     Strength   Right deltoid: 5/5  Left deltoid: 5/5  Right biceps: 5/5  Left biceps: 5/5  Right triceps: 5/5  Left triceps: 5/5  Right wrist flexion: 5/5  Left wrist flexion: 5/5  Right wrist extension: 5/5  Left wrist extension: 5/5  Right interossei: 5/5  Left interossei: 5/5  Right iliopsoas: 5/5  Left iliopsoas: 5/5  Right quadriceps: 5/5  Left quadriceps: 5/5  Right anterior tibial: 5/5  Left anterior tibial: 5/5  Right posterior tibial: 5/5  Left posterior tibial: 5/5  Right peroneal: 5/5  Left peroneal: 5/5  Right gastroc: 4/5  Left gastroc: 5/5  Decreased range of motion bilateral ankles secondary to multiple surgeries and hardware failure per patient.      Sensory Exam   Right arm light touch: normal  Left arm light touch: normal    Median nerve distribution right hand  Stocking distribution midshin to feet bilaterally    negative tinel's at right wrist     Gait, Coordination, and Reflexes     Reflexes   Right brachioradialis: 1+  Left brachioradialis: 1+  Right biceps: 1+  Left biceps: 1+  Right triceps: 1+  Left triceps: 1+  Right patellar: 2+ (pathologic spread)  Left patellar: 2+ (pathologic spread)  Right achilles: 0  Left achilles: 0  Right plantar: normal  Left plantar: normal  Right Barahona: absent  Left Barahona: present  Right ankle clonus: absent  Left ankle  "clonus: absent      Physical Exam  Vitals and nursing note reviewed.   Eyes:      Extraocular Movements: EOM normal.      Pupils: Pupils are equal, round, and reactive to light.   Neurological:      Mental Status: He is oriented to person, place, and time.      Deep Tendon Reflexes:      Reflex Scores:       Tricep reflexes are 1+ on the right side and 1+ on the left side.       Bicep reflexes are 1+ on the right side and 1+ on the left side.       Brachioradialis reflexes are 1+ on the right side and 1+ on the left side.       Patellar reflexes are 2+ (pathologic spread) on the right side and 2+ (pathologic spread) on the left side.       Achilles reflexes are 0 on the right side and 0 on the left side.  Psychiatric:         Speech: Speech normal.   Right leg open wounds.     Vital Signs  Pulse: 67  Resp: 18  BP: (!) 164/91  BP Location: Left arm  Patient Position: Sitting  Pain Score:   8  Pain Loc: Neck  Height and Weight  Height: 5' 7" (170.2 cm)  Weight: 71.2 kg (156 lb 15.5 oz)  BSA (Calculated - sq m): 1.83 sq meters  BMI (Calculated): 24.6  Weight in (lb) to have BMI = 25: 159.3]    Provider dictation:  All imaging was reviewed by Dr. Mao and discussed with the patient.     He will continue to monitor his symptoms of his neck and will continue with pain management for his lumbar spine.     Visit Diagnosis:  There are no diagnoses linked to this encounter.    "

## 2024-05-28 ENCOUNTER — TELEPHONE (OUTPATIENT)
Dept: NEPHROLOGY | Facility: CLINIC | Age: 79
End: 2024-05-28
Payer: MEDICARE

## 2024-05-28 NOTE — TELEPHONE ENCOUNTER
Spoke with Janene.      I explained that Dr Brandt cannot accept any more patients under his care at this time.  They again declined same day appointment in this office with Dr Brandt' associate, Dr. May.     She explained she scheduled his appt online as an EP and got a text Dr Brandt had availability today.   Thanked her for that information and assured her I would bring this to the attention of our administrators re: the scheduling issue.

## 2024-06-20 ENCOUNTER — OFFICE VISIT (OUTPATIENT)
Dept: NEPHROLOGY | Facility: CLINIC | Age: 79
End: 2024-06-20
Payer: MEDICARE

## 2024-06-20 VITALS — OXYGEN SATURATION: 96 % | DIASTOLIC BLOOD PRESSURE: 60 MMHG | SYSTOLIC BLOOD PRESSURE: 130 MMHG

## 2024-06-20 DIAGNOSIS — I10 HYPERTENSION, UNSPECIFIED TYPE: ICD-10-CM

## 2024-06-20 DIAGNOSIS — E11.21 CONTROLLED TYPE 2 DIABETES MELLITUS WITH MACROALBUMINURIC DIABETIC NEPHROPATHY: ICD-10-CM

## 2024-06-20 DIAGNOSIS — N25.81 SECONDARY HYPERPARATHYROIDISM OF RENAL ORIGIN: ICD-10-CM

## 2024-06-20 DIAGNOSIS — D50.9 IRON DEFICIENCY ANEMIA, UNSPECIFIED IRON DEFICIENCY ANEMIA TYPE: ICD-10-CM

## 2024-06-20 DIAGNOSIS — N18.32 STAGE 3B CHRONIC KIDNEY DISEASE: Primary | ICD-10-CM

## 2024-06-20 PROCEDURE — 1100F PTFALLS ASSESS-DOCD GE2>/YR: CPT | Mod: CPTII,S$GLB,, | Performed by: INTERNAL MEDICINE

## 2024-06-20 PROCEDURE — 3288F FALL RISK ASSESSMENT DOCD: CPT | Mod: CPTII,S$GLB,, | Performed by: INTERNAL MEDICINE

## 2024-06-20 PROCEDURE — 3075F SYST BP GE 130 - 139MM HG: CPT | Mod: CPTII,S$GLB,, | Performed by: INTERNAL MEDICINE

## 2024-06-20 PROCEDURE — 99999 PR PBB SHADOW E&M-EST. PATIENT-LVL III: CPT | Mod: PBBFAC,,, | Performed by: INTERNAL MEDICINE

## 2024-06-20 PROCEDURE — 1160F RVW MEDS BY RX/DR IN RCRD: CPT | Mod: CPTII,S$GLB,, | Performed by: INTERNAL MEDICINE

## 2024-06-20 PROCEDURE — 3078F DIAST BP <80 MM HG: CPT | Mod: CPTII,S$GLB,, | Performed by: INTERNAL MEDICINE

## 2024-06-20 PROCEDURE — 1159F MED LIST DOCD IN RCRD: CPT | Mod: CPTII,S$GLB,, | Performed by: INTERNAL MEDICINE

## 2024-06-20 PROCEDURE — 99215 OFFICE O/P EST HI 40 MIN: CPT | Mod: S$GLB,,, | Performed by: INTERNAL MEDICINE

## 2024-06-20 NOTE — PROGRESS NOTES
Subjective:        Patient ID: Rojelio Dye Sr. is a 78 y.o. White male who presents for return patient evaluation for chronic renal failure.      This is a patient I had seen in 2020 who comes back in today to establish care.  He has chronic edema and skin breakdown in his legs.  He has had several procedures for his leg vessels.  He has been off of losartan since November 2023.      Review of Systems   Constitutional:  Positive for activity change. Negative for fever.   Respiratory:  Positive for shortness of breath (exertion).    Cardiovascular:  Positive for leg swelling. Negative for chest pain.   Gastrointestinal:  Negative for abdominal pain, nausea and vomiting.   Genitourinary:  Positive for frequency.   Musculoskeletal:  Positive for arthralgias (hips, hands, shoulders) and gait problem.   Skin:  Positive for wound.   Neurological:  Negative for headaches.   Hematological:  Bruises/bleeds easily.       The past medical, family and social histories were reviewed for this encounter.     /60 (BP Location: Right arm, Patient Position: Sitting, BP Method: Large (Manual))   SpO2 96%     Objective:      Physical Exam  Vitals reviewed.   Constitutional:       General: He is not in acute distress.     Appearance: He is well-developed.   HENT:      Head: Normocephalic and atraumatic.   Eyes:      General: No scleral icterus.     Conjunctiva/sclera: Conjunctivae normal.   Neck:      Vascular: No JVD.   Cardiovascular:      Rate and Rhythm: Normal rate and regular rhythm.      Heart sounds: Normal heart sounds. No murmur heard.     No friction rub. No gallop.   Pulmonary:      Effort: Pulmonary effort is normal. No respiratory distress.      Breath sounds: Normal breath sounds. No wheezing.   Abdominal:      General: Bowel sounds are normal. There is no distension.      Palpations: Abdomen is soft.      Tenderness: There is no abdominal tenderness.   Musculoskeletal:      Right lower leg: Edema present.       Left lower leg: Edema present.   Skin:     General: Skin is warm and dry.      Findings: No rash.   Neurological:      Mental Status: He is alert and oriented to person, place, and time.         Assessment:       1. Stage 3b chronic kidney disease    2. 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 Hypertension, unspecified type    4. Secondary hyperparathyroidism of renal origin    5. Iron deficiency anemia, unspecified iron deficiency anemia type          Lab Results   Component Value Date    CREATININE 1.60 (H) 10/31/2023    BUN 33 (H) 10/31/2023     10/31/2023    K 4.1 10/31/2023     10/31/2023    CO2 31 10/31/2023     Lab Results   Component Value Date    PTH 73.5 10/31/2023    CALCIUM 8.9 10/31/2023    PHOS 4.1 10/31/2023     Lab Results   Component Value Date    HCT 41.5 10/31/2023     Prot/Creat Ratio, Urine   Date Value Ref Range Status   03/11/2021 1267.6 (H) 15.0 - 68.0 mg/g Final   12/20/2018 0.15 0.00 - 0.20 Final   12/14/2018 0.12 0.00 - 0.20 Final      Plan:   Return to clinic in 6 months.  Labs for next visit include labs per standing orders.  Baseline creatinine is 1.5-2.0 since 2015.  PTH is 74 with a calcium of 8.9.  Renal US shows R 11.3 cm, L 10.3 cm.  His proteinuria is up due to the fact his losartan was stopped.  Ideally I would restart that at low dose and titrate down something else.  His edema appears to be from PVD, not from a change in his CKD or heavy proteinuria.

## 2024-07-10 PROBLEM — J45.40 MODERATE PERSISTENT ASTHMA WITHOUT COMPLICATION: Status: ACTIVE | Noted: 2024-07-10

## 2024-10-30 ENCOUNTER — PATIENT MESSAGE (OUTPATIENT)
Dept: NEPHROLOGY | Facility: CLINIC | Age: 79
End: 2024-10-30
Payer: MEDICARE

## 2024-12-23 ENCOUNTER — OFFICE VISIT (OUTPATIENT)
Dept: NEPHROLOGY | Facility: CLINIC | Age: 79
End: 2024-12-23
Payer: MEDICARE

## 2024-12-23 VITALS
OXYGEN SATURATION: 98 % | HEART RATE: 58 BPM | SYSTOLIC BLOOD PRESSURE: 100 MMHG | BODY MASS INDEX: 23.96 KG/M2 | DIASTOLIC BLOOD PRESSURE: 66 MMHG | HEIGHT: 67 IN

## 2024-12-23 DIAGNOSIS — J02.8 ACUTE PHARYNGITIS DUE TO OTHER SPECIFIED ORGANISMS: ICD-10-CM

## 2024-12-23 DIAGNOSIS — N18.32 STAGE 3B CHRONIC KIDNEY DISEASE: Primary | ICD-10-CM

## 2024-12-23 DIAGNOSIS — E11.21 CONTROLLED TYPE 2 DIABETES MELLITUS WITH MACROALBUMINURIC DIABETIC NEPHROPATHY: ICD-10-CM

## 2024-12-23 DIAGNOSIS — I10 HYPERTENSION, UNSPECIFIED TYPE: ICD-10-CM

## 2024-12-23 DIAGNOSIS — N25.81 SECONDARY HYPERPARATHYROIDISM OF RENAL ORIGIN: ICD-10-CM

## 2024-12-23 DIAGNOSIS — D50.9 IRON DEFICIENCY ANEMIA, UNSPECIFIED IRON DEFICIENCY ANEMIA TYPE: ICD-10-CM

## 2024-12-23 PROCEDURE — 3074F SYST BP LT 130 MM HG: CPT | Mod: CPTII,S$GLB,, | Performed by: INTERNAL MEDICINE

## 2024-12-23 PROCEDURE — 3078F DIAST BP <80 MM HG: CPT | Mod: CPTII,S$GLB,, | Performed by: INTERNAL MEDICINE

## 2024-12-23 PROCEDURE — 99999 PR PBB SHADOW E&M-EST. PATIENT-LVL IV: CPT | Mod: PBBFAC,,, | Performed by: INTERNAL MEDICINE

## 2024-12-23 PROCEDURE — 1160F RVW MEDS BY RX/DR IN RCRD: CPT | Mod: CPTII,S$GLB,, | Performed by: INTERNAL MEDICINE

## 2024-12-23 PROCEDURE — 99214 OFFICE O/P EST MOD 30 MIN: CPT | Mod: S$GLB,,, | Performed by: INTERNAL MEDICINE

## 2024-12-23 PROCEDURE — 1159F MED LIST DOCD IN RCRD: CPT | Mod: CPTII,S$GLB,, | Performed by: INTERNAL MEDICINE

## 2024-12-23 RX ORDER — CARVEDILOL 12.5 MG/1
6.25 TABLET ORAL 2 TIMES DAILY
Qty: 60 TABLET | Refills: 5 | Status: SHIPPED | OUTPATIENT
Start: 2024-12-23

## 2024-12-23 RX ORDER — CEFUROXIME AXETIL 250 MG/1
250 TABLET ORAL 2 TIMES DAILY
Qty: 14 TABLET | Refills: 0 | Status: SHIPPED | OUTPATIENT
Start: 2024-12-23

## 2024-12-23 NOTE — PROGRESS NOTES
"  Subjective:        Patient ID: Rojelio Dye Sr. is a 79 y.o. White male who presents for return patient evaluation for chronic renal failure.      This is a patient I had seen in 2020 who comes back to continue care.  He has chronic edema and skin breakdown in his legs.  He has had several procedures for his leg vessels.  He has been off of losartan since November 2023.      Review of Systems   Constitutional:  Positive for activity change. Negative for fever.   Respiratory:  Positive for shortness of breath (exertion).    Cardiovascular:  Positive for leg swelling. Negative for chest pain.   Gastrointestinal:  Negative for abdominal pain, nausea and vomiting.   Genitourinary:  Positive for frequency.   Musculoskeletal:  Positive for arthralgias (hips, hands, shoulders) and gait problem.   Skin:  Positive for wound.   Neurological:  Negative for headaches.   Hematological:  Bruises/bleeds easily.       The past medical, family and social histories were reviewed for this encounter.     /66 (BP Location: Right arm, Patient Position: Sitting)   Pulse (!) 58   Ht 5' 7" (1.702 m)   SpO2 98%   BMI 23.96 kg/m²     Objective:      Physical Exam  Vitals reviewed.   Constitutional:       General: He is not in acute distress.     Appearance: He is well-developed.   HENT:      Head: Normocephalic and atraumatic.   Eyes:      General: No scleral icterus.     Conjunctiva/sclera: Conjunctivae normal.   Neck:      Vascular: No JVD.   Cardiovascular:      Rate and Rhythm: Normal rate and regular rhythm.      Heart sounds: Normal heart sounds. No murmur heard.     No friction rub. No gallop.   Pulmonary:      Effort: Pulmonary effort is normal. No respiratory distress.      Breath sounds: Normal breath sounds. No wheezing.   Abdominal:      General: Bowel sounds are normal. There is no distension.      Palpations: Abdomen is soft.      Tenderness: There is no abdominal tenderness.   Musculoskeletal:      Right lower " leg: Edema (trace) present.      Left lower leg: Edema (trace) present.   Skin:     General: Skin is warm and dry.      Findings: No rash.   Neurological:      Mental Status: He is alert and oriented to person, place, and time.         Assessment:       1. Stage 3b chronic kidney disease    2. Hypertension, unspecified type    3. 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 Secondary hyperparathyroidism of renal origin    5. Iron deficiency anemia, unspecified iron deficiency anemia type          Lab Results   Component Value Date    CREATININE 2.19 (H) 10/30/2024    BUN 55 (H) 10/30/2024     10/30/2024    K 4.6 10/30/2024     10/30/2024    CO2 25 10/30/2024     Lab Results   Component Value Date    PTH 73.5 10/31/2023    CALCIUM 9.4 10/30/2024    PHOS 4.1 10/30/2024     Lab Results   Component Value Date    HCT 39.1 (L) 09/05/2024     Prot/Creat Ratio, Urine   Date Value Ref Range Status   03/11/2021 1267.6 (H) 15.0 - 68.0 mg/g Final   12/20/2018 0.15 0.00 - 0.20 Final   12/14/2018 0.12 0.00 - 0.20 Final      Plan:   Return to clinic in 6 months.  Labs for next visit include labs per standing orders Q 3 months  Baseline creatinine is 1.5-2.0 since 2015.  PTH is 74 with a calcium of 9.4.  Renal US shows R 11.3 cm, L 10.3 cm.  His proteinuria is 1.2 grams due to the fact his losartan was stopped.  Ideally I would restart that at low dose and titrate down something else.  His edema appears to be from PVD, not from a change in his CKD or heavy proteinuria.  Drop coreg dose to 6.25 due to low blood pressure.    KFRE 2-Year: 9.1% at 10/30/2024  8:58 AM  Calculated from:  Serum Creatinine: 2.19 mg/dL at 10/30/2024  8:58 AM  Urine Albumin Creatinine Ratio: 768 mg/g creat at 5/29/2024  2:39 PM  Age: 79 years  Sex: Male at 10/30/2024  8:58 AM  Has CKD-3 to CKD-5: Yes    KFRE 5-Year: 25.6% at 10/30/2024  8:58 AM  Calculated from:  Serum Creatinine: 2.19 mg/dL at 10/30/2024   8:58 AM  Urine Albumin Creatinine Ratio: 768 mg/g creat at 5/29/2024  2:39 PM  Age: 79 years  Sex: Male at 10/30/2024  8:58 AM  Has CKD-3 to CKD-5: Yes

## 2025-04-03 ENCOUNTER — OFFICE VISIT (OUTPATIENT)
Dept: OTOLARYNGOLOGY | Facility: CLINIC | Age: 80
End: 2025-04-03
Payer: MEDICARE

## 2025-04-03 VITALS — WEIGHT: 151 LBS | HEIGHT: 67 IN | BODY MASS INDEX: 23.7 KG/M2

## 2025-04-03 DIAGNOSIS — Z78.9 INTOLERANCE OF CONTINUOUS POSITIVE AIRWAY PRESSURE (CPAP) VENTILATION: ICD-10-CM

## 2025-04-03 DIAGNOSIS — G47.33 OSA (OBSTRUCTIVE SLEEP APNEA): Primary | ICD-10-CM

## 2025-04-03 PROCEDURE — 99999 PR PBB SHADOW E&M-EST. PATIENT-LVL V: CPT | Mod: PBBFAC,,, | Performed by: OTOLARYNGOLOGY

## 2025-04-03 PROCEDURE — 1159F MED LIST DOCD IN RCRD: CPT | Mod: CPTII,S$GLB,, | Performed by: OTOLARYNGOLOGY

## 2025-04-03 PROCEDURE — 1126F AMNT PAIN NOTED NONE PRSNT: CPT | Mod: CPTII,S$GLB,, | Performed by: OTOLARYNGOLOGY

## 2025-04-03 PROCEDURE — 1100F PTFALLS ASSESS-DOCD GE2>/YR: CPT | Mod: CPTII,S$GLB,, | Performed by: OTOLARYNGOLOGY

## 2025-04-03 PROCEDURE — 3288F FALL RISK ASSESSMENT DOCD: CPT | Mod: CPTII,S$GLB,, | Performed by: OTOLARYNGOLOGY

## 2025-04-03 PROCEDURE — 99204 OFFICE O/P NEW MOD 45 MIN: CPT | Mod: S$GLB,,, | Performed by: OTOLARYNGOLOGY

## 2025-04-03 PROCEDURE — 1160F RVW MEDS BY RX/DR IN RCRD: CPT | Mod: CPTII,S$GLB,, | Performed by: OTOLARYNGOLOGY

## 2025-04-03 NOTE — PROGRESS NOTES
Subjective:       Patient ID: Rojelio Dye Tonya is a 79 y.o. male.    Chief Complaint: Consult (Inspire )    Rojelio is here for Obstructive sleep apnea and intolerance of CPAP.  he was diagnosed with ADRIANNA >8 years ago.   Last diagnostic sleep study: 2016. TIBURCIO / AHI: 38  Had a titration 4/24 which could not find a therapeutic pressure.  He had a titration in 2022 which was successful at 10 cm H2O  He had a desire to get a smaller machine more recently and when he got a new mask and machine, he has had significant issues with mask leak. He moves around a lot during sleep and it is very hard to get rest. Trying to reapply multiple times per night,   He has RLS.   He has such severe fatigue that he is falling asleep standing up in the kitchen and had falls.   Previous surgical treatments for sleep apnea: none  Body mass index is 23.65 kg/m².      Pertinent surgery: T&A, nasal surgery    Patient validated questionnaires (if applicable):      %            No data to display                   No data to display                   No data to display                     Tobacco Use History[1]  Social History     Substance and Sexual Activity   Alcohol Use Yes    Comment: rarely          Objective:        Constitutional:   He is oriented to person, place, and time. He appears well-developed and well-nourished. He appears alert. He is active. Normal speech.      Head:  Normocephalic and atraumatic. Head is without TMJ tenderness. No scars. Salivary glands normal.  Facial strength is normal.      Ears:    Right Ear: No drainage or swelling. No middle ear effusion.   Left Ear: No drainage or swelling.  No middle ear effusion.     Nose:  No mucosal edema, rhinorrhea or sinus tenderness. No turbinate hypertrophy.      Mouth/Throat  Oropharynx clear and moist without lesions or asymmetry, normal uvula midline and mirror exam normal. Normal dentition. No uvula swelling, lacerations or trismus. No oropharyngeal exudate. Tonsillar  erythema, tonsillar exudate.      Neck:  Full range of motion with neck supple and no adenopathy. Thyroid tenderness is present. No tracheal deviation, no edema, no erythema, normal range of motion, no stridor, no crepitus and no neck rigidity present. No thyroid mass present.     Cardiovascular:    Intact distal pulses and normal pulses.              Pulmonary/Chest:   Effort normal and breath sounds normal. No stridor.     Psychiatric:   His speech is normal and behavior is normal. His mood appears not anxious. His affect is not labile.     Neurological:   He is alert and oriented to person, place, and time. No sensory deficit.     Skin:   No abrasions, lacerations, lesions, or rashes. No abrasion and no bruising noted.         Tests / Results:  I personally reviewed the HST/PSG as noted above in the HPI    Assessment:       1. ADRIANNA (obstructive sleep apnea)    2. Severe obstructive sleep apnea          Plan:         Discussed background of ADRIANNA at length including medical therapy and surgical therapy    Patient interested in Inspire  Discussed  Repeat sleep study (last diagnostic was 2016)   DISE (drug induced sleep endoscopy)    I discussed the risks of hypoglossal nerve stimulation including: scar, bleeding, numbness of incision, numbness or weakness of tongue or marginal mandibular nerve, irritation of tongue from stimulation, implant failure, inadequate improvement, need for further procedures, need for removal of implant, infection, pneumothorax, conditional MRI compatibility.                   [1]   Social History  Tobacco Use   Smoking Status Never    Passive exposure: Never   Smokeless Tobacco Never   Tobacco Comments    2nd Hand -Father smoked 3 packs a day

## 2025-04-03 NOTE — PATIENT INSTRUCTIONS
Information regarding Hypoglossal Nerve Stimulation Therapy:    Hypoglossal Nerve Stimulation Therapy (HGNS) is a surgical treatment for sleep apnea when a patient fails standard positive pressure (CPAP) therapy. This is an effective surgical therapy for sleep apnea with long term effectiveness in many patients.    What is HGNS and how does it work?  The hypoglossal nerve is the nerve that controls nearly all of the tongue movements.   During sleep, the tongue will often fall into the back of the throat. This causes obstruction and apneas by narrowing the throat.  By targeting the tongue muscles, we can often control the prevention of tongue collapse during sleep thereby preventing collapse and apnea.  This procedure involves inserting an implant over the muscles of the chest wall (similar to a pacemaker.) This implant is connected to the nerve that moves the tongue forward. During sleep, the implant will detect breathing effort, and during inspiration, it will cause a gentle pulse of the tongue forward to prevent collapse.   The machine can be turned on and off, and it can be dialed up or down depending on strength needed to move the tongue forward.    Will I feel it? Will it affect my sleep at night?  Once the implant is in place, it is activated after 1 month. After this, you will begin increasing the settings to find the most comfortable setting for you. We work hard with you to get the right settings.   A majority of patients are not disturbed at night with the device, and satisfaction is extremely high (95%.) This is especially true when compared to wearing an external device (CPAP.)   A post-titration sleep study is typically obtained about 3 months after surgery to assess response.     Am I a Candidate?  Candidacy for HGNS is changing over time; however, current general candidacy requirements are:  Moderate or Severe Sleep Apnea (AHI 15-65) as measured by a recent sleep study  Body Mass Index (BMI) < 35    Failure to tolerate CPAP therapy  Pre-operative endoscopy exam confirming candidacy (performed during a quick outpatient procedure)  No contraindications to implant  Approval by insurance company  **The current device is currently under conditional acceptance for use with MRI of the thoracic, shoulder, or abdomen area.    How is the surgery performed:  The surgery is usually performed in an outpatient setting, under general anesthesia.  The procedure generally takes a few hours.  The procedure involved two incisions: one in the neck, just below the jaw line. The other is in the chest between the 2nd and third rib. The surgical scars generally heal very well and are minimally noticeable.   Please let your doctor know if you have had surgery or significant trauma to the chest or neck.     What are the risks?  General surgical risks, similar to any procedure include  Bleeding or hematoma  Numbness over incision site  Incisional scar  Pain over incision site  Infection of implant requiring revision or replacement (<1%)  Intolerance to the therapy resulting in non-usage    Specific risks to this surgery include:  Temporary tongue weakness or tingling, rarely permanent (< 1%)  Mechanical failure of the implant  Temporary or permanent weakness to the muscles of the lip (< 1%)  Stimulation related discomfort or tongue abrasions (8%)  Pneumothorax (dropped lung)    After care:  Instructions will be given following the surgery  Generally, light activity for 2 weeks is recommended.  Blood thinners are held prior to surgery at the discretion of the surgeon and can usually be resumed within 48 hours.     Post-operative timeline:    WEEK 1:  About 7 days after your procedure, you will return to the office for a follow up visit. At this time any surtures that we placed will be removed and your incisions will be checked by the pysician. Please note that device will remain inactive for ONE MONTH. This is refered to as your  healing phase. We want to give your body time to heal before we turn the device on. During this time it would be benefical for you to watch the vidoes on the inspire jabier. Simply download the jabier if you have not done so, then click on the RESOURCES tab. From there, scrol to the INSPIRE CARE section. There are vidoes here that walk you though this phase and will prepare you for the activation visit. Focus on the vidoes entitled: Rest and Heal, Learning your sleep remote, and Tuning Inspire. This will help you feel educated and empowered for your visit.     WEEK 4:  At the one month visit you will come to the office for the device activation. Please wear a shirt or blouse that can allow the physician to check all the incisions and access the implant. Your physician will use a  to check the device for proper function and turn it on for you to begin using Inspire therapy. The process of activation is NOT painful. At this visit you will be given your remote control and instructions on how to use. You will return home to begin using your therapy with the goal of using it ALL NIGHT, EVERY NIGHT. If you have any issues with discomfort, please call the office so we can help you reach this goal.    WEEK 6-8:  Someone from our office will be doing a check in call to see how you are progressing. If you are having any issues, we can bring you in to the office and make some appropratie adjustments as needed. Otherwise, you will continue to keep stepping up your therapy and using it all night.     WEEK 12:  Roughly 3 months after your device is turned on, you will return to the sleep lab. This will help us determine if your therapy is at the appropriate level for you. A couple of weeks after your sleep study, you will return to the office to review your results and make any changes that your provider feels necessary.       FAQ:  I don't feel the stimulation?   Answer: During the tuning phase, you will get used to  stimulation.  It's very common to not feel stimulation.  Try stepping up your level.    The stimulation does not synchronize with my breathing?    Answer: This is normal.  Stimulation timing is designed to work best while you are asleep.  Your breathing pattern is much different while you are awake.  When you go to sleep, the sensor will work normally.     Can I receive an MRI?    Answer: If you have the model 3028 generator it is MRI Conditional, make sure your doctor goes to the Inspire website for more information on how to perform a safe MRI for you.     Can I go to the airport?    Answer: Yes.  You may go through metal detectors and scanners.  Make sure to show the TSA official your medical device registration card and tell them you have an implantable device in your body.  They may require extra screening, so make sure to plan for extra time your first time going to the airport.     Can I go scuba diving and mountain climbing?    Answer: You can go to as high in elevation as you want.  However, you can only go 30 meters below sea level or 4 atmospheres of pressure.     Can I continue welding?    Answer: Inspire does not recommend electrical welding.  Consult your patient manual for more details.    Can I go to the dentist?    Answer: Going to the dentist is perfectly fine. Let your dentist know you have an implantable device and the dentist will take any precautions needed.     Can I receive X-Rays or CT Scans?    Answer: Yes.  X-Ray and CT Scans are safe with Inspire.  If you are having a mammogram though, make sure to let the technician know where your device is implanted so the technician can make the mammogram as comfortable as possible.     Why is stimulation on when I'm awake?    Answer: This is normal.  Inspire works by stimulating during every breath.  Inspire does not know when you are asleep or awake, so once you turn on Inspire, it will stimulate with each breath. Remember, you are in complete  control of your Inspire and can turn Inspire ON or OFF using your Inspire Sleep Remote.     Why is stimulation off when I wake up?    Answer: You may have slept longer than the Therapy Duration Setting, which by default is 8 hours.  Inspire will turn off automatically after 8 hours.  If you are sleeping longer than 8 hours, ask your physician to change the duration setting to a longer time (for example 9 or 10 hours).  It is also possible that you are getting used to Inspire and just don't feel the stimulation when you wake up.  If you're sleep remote is green when you gently shake it, your Inspire is still on.  If your Inspire is white, then it is no longer stimulating.       More information can be found at:  Www.Inspiresleep.com

## 2025-04-30 ENCOUNTER — RESULTS FOLLOW-UP (OUTPATIENT)
Dept: OTOLARYNGOLOGY | Facility: CLINIC | Age: 80
End: 2025-04-30

## 2025-04-30 ENCOUNTER — TELEPHONE (OUTPATIENT)
Dept: OTOLARYNGOLOGY | Facility: CLINIC | Age: 80
End: 2025-04-30
Payer: MEDICARE

## 2025-04-30 NOTE — TELEPHONE ENCOUNTER
DISE scheduled for 5/14 at [facility], reviewed instructions with pt, he verbalized understanding.

## 2025-04-30 NOTE — TELEPHONE ENCOUNTER
----- Message from Gianfranco lEias MD sent at 4/30/2025  2:45 PM CDT -----  Rojelio,  Your sleep study results are back. The study showed you have severe sleep apnea. You paused or stopped breathing 27-40 times per hour.    Based on this result, options may include Inspire if you'd like to proceed.    If you would like to proceed with Inspire, the next step would be a sleep endoscopy, where I evaluate the throat collapse under sedation to determine whether you would qualify.    Let me know if you have any questions.    Gianfranco Elias MD  Otolaryngology - Head and Neck Surgery  Office: [phone]  Cell: [phone]  Fax: [phone]    This message was generated using voice dictation. Please excuse any errors that may have been created by the transcription software.    ----- Message -----  From: Interface, Lab In [facility]  Sent: 4/30/2025   2:03 PM CDT  To: Gianfranco Elias MD

## 2025-04-30 NOTE — TELEPHONE ENCOUNTER
Reviewed results and recommendations with pt, he verbalized understanding. However, pt does have questions regarding placement of the Inspire and if he will be able to shoot a shotgun for hunting season? Please call pt to discuss, thanks.

## 2025-04-30 NOTE — TELEPHONE ENCOUNTER
----- Message from Gianfranco Elias MD sent at 4/30/2025  2:45 PM CDT -----  Rojelio,  Your sleep study results are back. The study showed you have severe sleep apnea. You paused or stopped breathing 27-40 times per hour.    Based on this result, options may include Inspire if you'd like to proceed.    If you would like to proceed with Inspire, the next step would be a sleep endoscopy, where I evaluate the throat collapse under sedation to determine whether you would qualify.    Let me know if you have any questions.    Gianfranco Elias MD  Otolaryngology - Head and Neck Surgery  Office: [phone]  Cell: [phone]  Fax: [phone]    This message was generated using voice dictation. Please excuse any errors that may have been created by the transcription software.    ----- Message -----  From: Interface, Lab In [facility]  Sent: 4/30/2025   2:03 PM CDT  To: Gianfranco Elias MD

## 2025-05-01 DIAGNOSIS — G47.33 OSA (OBSTRUCTIVE SLEEP APNEA): Primary | ICD-10-CM

## 2025-05-01 DIAGNOSIS — Z78.9 INTOLERANCE OF CONTINUOUS POSITIVE AIRWAY PRESSURE (CPAP) VENTILATION: ICD-10-CM

## 2025-05-07 DIAGNOSIS — G47.33 OSA (OBSTRUCTIVE SLEEP APNEA): Primary | ICD-10-CM

## 2025-05-07 DIAGNOSIS — Z78.9 INTOLERANCE OF CONTINUOUS POSITIVE AIRWAY PRESSURE (CPAP) VENTILATION: ICD-10-CM

## 2025-05-13 ENCOUNTER — TELEPHONE (OUTPATIENT)
Dept: OTOLARYNGOLOGY | Facility: CLINIC | Age: 80
End: 2025-05-13
Payer: MEDICARE

## 2025-05-13 NOTE — TELEPHONE ENCOUNTER
----- Message from JUDSON Darby sent at 5/13/2025  9:18 AM CDT -----  Regarding: Pt wants to cancel surgery  Pt called, he has a sinus infection and open leg wounds, and wants to cancel for tomorrow and reschedule at a later date.  He should be reaching out to you.Mehnaz [facility] Admission [phone]

## 2025-05-13 NOTE — TELEPHONE ENCOUNTER
----- Message from Lb sent at 5/13/2025  9:25 AM CDT -----  Contact: Pt  .Type:  Needs Medical AdviceWho Called: ptWould the patient rather a call back or a response via [facility]?  Call Tre Call Back Number:  [phone] Information: Pt. Is calling to cancel his procedure for tomorrow 05/14/2025

## 2025-05-13 NOTE — TELEPHONE ENCOUNTER
I spoke with pt,  He states he has a new sinus infection that started last night.  Yellow when he blows his nose and temp of 100.9 only at night.  Also he has 2 open wounds on his leg just started two days ago.  He feels he should reschedule his DISE for tomorrow unless you feel he can still do the procedure.  He is willing to reschedule for 2-3 weeks feels he will be all better.

## 2025-05-30 ENCOUNTER — PATIENT MESSAGE (OUTPATIENT)
Dept: OTOLARYNGOLOGY | Facility: CLINIC | Age: 80
End: 2025-05-30
Payer: MEDICARE

## 2025-05-30 DIAGNOSIS — Z78.9 INTOLERANCE OF CONTINUOUS POSITIVE AIRWAY PRESSURE (CPAP) VENTILATION: ICD-10-CM

## 2025-05-30 DIAGNOSIS — G47.33 OSA (OBSTRUCTIVE SLEEP APNEA): Primary | ICD-10-CM

## 2025-06-05 PROBLEM — L03.119 LOWER EXTREMITY CELLULITIS: Status: ACTIVE | Noted: 2022-11-24

## 2025-06-06 PROBLEM — S81.809A MULTIPLE OPEN WOUNDS OF LOWER EXTREMITY: Status: ACTIVE | Noted: 2025-06-06

## 2025-06-07 PROBLEM — M79.89 LOCALIZED SWELLING OF BOTH LOWER EXTREMITIES: Status: ACTIVE | Noted: 2025-06-07

## 2025-06-07 PROBLEM — I87.2 VENOUS INSUFFICIENCY: Status: ACTIVE | Noted: 2020-02-07

## 2025-06-23 ENCOUNTER — TELEPHONE (OUTPATIENT)
Dept: NEPHROLOGY | Facility: CLINIC | Age: 80
End: 2025-06-23
Payer: MEDICARE

## 2025-06-27 ENCOUNTER — OFFICE VISIT (OUTPATIENT)
Dept: NEPHROLOGY | Facility: CLINIC | Age: 80
End: 2025-06-27
Payer: MEDICARE

## 2025-06-27 VITALS
DIASTOLIC BLOOD PRESSURE: 80 MMHG | HEIGHT: 67 IN | HEART RATE: 92 BPM | SYSTOLIC BLOOD PRESSURE: 138 MMHG | OXYGEN SATURATION: 95 % | WEIGHT: 155 LBS | BODY MASS INDEX: 24.33 KG/M2

## 2025-06-27 DIAGNOSIS — D50.9 IRON DEFICIENCY ANEMIA, UNSPECIFIED IRON DEFICIENCY ANEMIA TYPE: ICD-10-CM

## 2025-06-27 DIAGNOSIS — N18.32 STAGE 3B CHRONIC KIDNEY DISEASE: Primary | ICD-10-CM

## 2025-06-27 DIAGNOSIS — I10 HYPERTENSION, UNSPECIFIED TYPE: ICD-10-CM

## 2025-06-27 DIAGNOSIS — E11.21 CONTROLLED TYPE 2 DIABETES MELLITUS WITH MACROALBUMINURIC DIABETIC NEPHROPATHY: ICD-10-CM

## 2025-06-27 DIAGNOSIS — N25.81 SECONDARY HYPERPARATHYROIDISM OF RENAL ORIGIN: ICD-10-CM

## 2025-06-27 PROBLEM — N18.4 CHRONIC KIDNEY DISEASE, STAGE IV (SEVERE): Status: RESOLVED | Noted: 2022-01-20 | Resolved: 2025-06-27

## 2025-06-27 PROCEDURE — 99999 PR PBB SHADOW E&M-EST. PATIENT-LVL V: CPT | Mod: PBBFAC,,, | Performed by: INTERNAL MEDICINE

## 2025-06-27 RX ORDER — SPIRONOLACTONE 25 MG/1
25 TABLET ORAL
COMMUNITY

## 2025-06-27 NOTE — PROGRESS NOTES
"  Subjective:        Patient ID: Rojelio Dye Sr. is a 79 y.o. White male who presents for return patient evaluation for chronic renal failure.      He had a recent admit for his legs and we were not consulted.  He has chronic edema and skin breakdown in his legs.  He has had several procedures for his leg vessels.  He has been off of losartan since November 2023.  He is taking lasix QOD.      Review of Systems   Constitutional:  Positive for activity change. Negative for fever.   Eyes:  Positive for visual disturbance.   Respiratory:  Positive for shortness of breath (exertion).    Cardiovascular:  Positive for leg swelling. Negative for chest pain.   Gastrointestinal:  Negative for abdominal pain, nausea and vomiting.   Genitourinary:  Positive for frequency.   Musculoskeletal:  Positive for arthralgias (hips, hands, shoulders) and gait problem.   Skin:  Positive for wound.   Neurological:  Negative for headaches.   Hematological:  Bruises/bleeds easily.       The past medical, family and social histories were reviewed for this encounter.     /80 (BP Location: Left arm, Patient Position: Sitting)   Pulse 92   Ht 5' 7" (1.702 m)   Wt 70.3 kg (154 lb 15.7 oz)   SpO2 95%   BMI 24.27 kg/m²     Objective:      Physical Exam  Vitals reviewed.   Constitutional:       General: He is not in acute distress.     Appearance: He is well-developed.   HENT:      Head: Normocephalic and atraumatic.   Eyes:      General: No scleral icterus.     Conjunctiva/sclera: Conjunctivae normal.   Neck:      Vascular: No JVD.   Cardiovascular:      Rate and Rhythm: Normal rate and regular rhythm.      Heart sounds: Normal heart sounds. No murmur heard.     No friction rub. No gallop.   Pulmonary:      Effort: Pulmonary effort is normal. No respiratory distress.      Breath sounds: Normal breath sounds. No wheezing.   Abdominal:      General: Bowel sounds are normal. There is no distension.      Palpations: Abdomen is soft.    "   Tenderness: There is no abdominal tenderness.   Musculoskeletal:      Right lower leg: Edema present.      Left lower leg: Edema present.   Skin:     General: Skin is warm and dry.      Findings: No rash.   Neurological:      Mental Status: He is alert and oriented to person, place, and time.         Assessment:       1. Stage 3b chronic kidney disease    2. Hypertension, unspecified type    3. 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 Secondary hyperparathyroidism of renal origin    5. Iron deficiency anemia, unspecified iron deficiency anemia type        Lab Results   Component Value Date    CREATININE 2.11 (H) 06/23/2025    BUN 48 (H) 06/23/2025     06/23/2025    K 4.8 06/23/2025     06/23/2025    CO2 23 06/23/2025     Lab Results   Component Value Date    .1 (H) 06/18/2025    CALCIUM 8.9 06/23/2025    PHOS 4.5 06/18/2025     Lab Results   Component Value Date    HCT 38.9 (L) 06/07/2025     Prot/Creat Ratio, Urine   Date Value Ref Range Status   06/18/2025 0.8 (H) 0.0 - 0.2 Final   03/11/2021 1267.6 (H) 15.0 - 68.0 mg/g Final   12/20/2018 0.15 0.00 - 0.20 Final      Plan:   Return to clinic in 6 months.  Labs for next visit include labs per standing orders Q 3 months  Baseline creatinine is 1.5-2.0 since 2015.  PTH is 202 with a calcium of 8.9.  Start D3 2000 units daily.  Renal US shows R 11.3 cm, L 10.3 cm.  His proteinuria is 0.8 grams due to the fact his losartan was stopped.      KFRE 2-Year: 4.7% at 5/13/2025  2:51 PM  Calculated from:  Serum Creatinine: 1.89 mg/dL at 5/13/2025  2:51 PM  Urine Albumin Creatinine Ratio: 768 mg/g creat at 5/29/2024  2:39 PM  Age: 79 years  Sex: Male at 5/13/2025  2:51 PM  Has CKD-3 to CKD-5: Yes    KFRE 5-Year: 14.1% at 5/13/2025  2:51 PM  Calculated from:  Serum Creatinine: 1.89 mg/dL at 5/13/2025  2:51 PM  Urine Albumin Creatinine Ratio: 768 mg/g creat at 5/29/2024  2:39 PM  Age: 79 years  Sex: Male at  5/13/2025  2:51 PM  Has CKD-3 to CKD-5: Yes

## 2025-07-31 ENCOUNTER — PATIENT MESSAGE (OUTPATIENT)
Dept: OTOLARYNGOLOGY | Facility: CLINIC | Age: 80
End: 2025-07-31
Payer: MEDICARE